# Patient Record
Sex: FEMALE | Race: WHITE | NOT HISPANIC OR LATINO
[De-identification: names, ages, dates, MRNs, and addresses within clinical notes are randomized per-mention and may not be internally consistent; named-entity substitution may affect disease eponyms.]

---

## 2018-07-17 ENCOUNTER — TRANSCRIPTION ENCOUNTER (OUTPATIENT)
Age: 58
End: 2018-07-17

## 2018-07-17 ENCOUNTER — INPATIENT (INPATIENT)
Facility: HOSPITAL | Age: 58
LOS: 7 days | Discharge: NURSING, REHAB OR LONG-TERM CARE | DRG: 65 | End: 2018-07-25
Attending: STUDENT IN AN ORGANIZED HEALTH CARE EDUCATION/TRAINING PROGRAM | Admitting: STUDENT IN AN ORGANIZED HEALTH CARE EDUCATION/TRAINING PROGRAM
Payer: COMMERCIAL

## 2018-07-17 VITALS
HEART RATE: 89 BPM | WEIGHT: 220.02 LBS | HEIGHT: 65 IN | DIASTOLIC BLOOD PRESSURE: 73 MMHG | OXYGEN SATURATION: 99 % | TEMPERATURE: 98 F | RESPIRATION RATE: 18 BRPM | SYSTOLIC BLOOD PRESSURE: 125 MMHG

## 2018-07-17 DIAGNOSIS — I63.9 CEREBRAL INFARCTION, UNSPECIFIED: ICD-10-CM

## 2018-07-17 DIAGNOSIS — Z29.9 ENCOUNTER FOR PROPHYLACTIC MEASURES, UNSPECIFIED: ICD-10-CM

## 2018-07-17 DIAGNOSIS — F32.9 MAJOR DEPRESSIVE DISORDER, SINGLE EPISODE, UNSPECIFIED: ICD-10-CM

## 2018-07-17 DIAGNOSIS — C50.919 MALIGNANT NEOPLASM OF UNSPECIFIED SITE OF UNSPECIFIED FEMALE BREAST: ICD-10-CM

## 2018-07-17 DIAGNOSIS — D72.829 ELEVATED WHITE BLOOD CELL COUNT, UNSPECIFIED: ICD-10-CM

## 2018-07-17 LAB
ALBUMIN SERPL ELPH-MCNC: 3.3 G/DL — LOW (ref 3.5–5)
ALP SERPL-CCNC: 109 U/L — SIGNIFICANT CHANGE UP (ref 40–120)
ALT FLD-CCNC: 21 U/L DA — SIGNIFICANT CHANGE UP (ref 10–60)
ANION GAP SERPL CALC-SCNC: 11 MMOL/L — SIGNIFICANT CHANGE UP (ref 5–17)
APTT BLD: 29.1 SEC — SIGNIFICANT CHANGE UP (ref 27.5–37.4)
AST SERPL-CCNC: 16 U/L — SIGNIFICANT CHANGE UP (ref 10–40)
BASOPHILS # BLD AUTO: 0.1 K/UL — SIGNIFICANT CHANGE UP (ref 0–0.2)
BASOPHILS NFR BLD AUTO: 0.8 % — SIGNIFICANT CHANGE UP (ref 0–2)
BILIRUB SERPL-MCNC: 1.1 MG/DL — SIGNIFICANT CHANGE UP (ref 0.2–1.2)
BUN SERPL-MCNC: 15 MG/DL — SIGNIFICANT CHANGE UP (ref 7–18)
CALCIUM SERPL-MCNC: 9.7 MG/DL — SIGNIFICANT CHANGE UP (ref 8.4–10.5)
CHLORIDE SERPL-SCNC: 103 MMOL/L — SIGNIFICANT CHANGE UP (ref 96–108)
CK MB BLD-MCNC: 1.2 % — SIGNIFICANT CHANGE UP (ref 0–3.5)
CK MB CFR SERPL CALC: 1.1 NG/ML — SIGNIFICANT CHANGE UP (ref 0–3.6)
CK SERPL-CCNC: 88 U/L — SIGNIFICANT CHANGE UP (ref 21–215)
CO2 SERPL-SCNC: 24 MMOL/L — SIGNIFICANT CHANGE UP (ref 22–31)
CREAT SERPL-MCNC: 1.03 MG/DL — SIGNIFICANT CHANGE UP (ref 0.5–1.3)
EOSINOPHIL # BLD AUTO: 0.1 K/UL — SIGNIFICANT CHANGE UP (ref 0–0.5)
EOSINOPHIL NFR BLD AUTO: 0.6 % — SIGNIFICANT CHANGE UP (ref 0–6)
GLUCOSE SERPL-MCNC: 120 MG/DL — HIGH (ref 70–99)
HCT VFR BLD CALC: 44.3 % — SIGNIFICANT CHANGE UP (ref 34.5–45)
HGB BLD-MCNC: 14.7 G/DL — SIGNIFICANT CHANGE UP (ref 11.5–15.5)
INR BLD: 1.13 RATIO — SIGNIFICANT CHANGE UP (ref 0.88–1.16)
LYMPHOCYTES # BLD AUTO: 1.4 K/UL — SIGNIFICANT CHANGE UP (ref 1–3.3)
LYMPHOCYTES # BLD AUTO: 12.1 % — LOW (ref 13–44)
MCHC RBC-ENTMCNC: 28.5 PG — SIGNIFICANT CHANGE UP (ref 27–34)
MCHC RBC-ENTMCNC: 33.2 GM/DL — SIGNIFICANT CHANGE UP (ref 32–36)
MCV RBC AUTO: 85.8 FL — SIGNIFICANT CHANGE UP (ref 80–100)
MONOCYTES # BLD AUTO: 0.7 K/UL — SIGNIFICANT CHANGE UP (ref 0–0.9)
MONOCYTES NFR BLD AUTO: 6.6 % — SIGNIFICANT CHANGE UP (ref 2–14)
NEUTROPHILS # BLD AUTO: 9 K/UL — HIGH (ref 1.8–7.4)
NEUTROPHILS NFR BLD AUTO: 79.9 % — HIGH (ref 43–77)
PLATELET # BLD AUTO: 238 K/UL — SIGNIFICANT CHANGE UP (ref 150–400)
POTASSIUM SERPL-MCNC: 3.7 MMOL/L — SIGNIFICANT CHANGE UP (ref 3.5–5.3)
POTASSIUM SERPL-SCNC: 3.7 MMOL/L — SIGNIFICANT CHANGE UP (ref 3.5–5.3)
PROT SERPL-MCNC: 7.5 G/DL — SIGNIFICANT CHANGE UP (ref 6–8.3)
PROTHROM AB SERPL-ACNC: 12.4 SEC — SIGNIFICANT CHANGE UP (ref 9.8–12.7)
RBC # BLD: 5.16 M/UL — SIGNIFICANT CHANGE UP (ref 3.8–5.2)
RBC # FLD: 12.7 % — SIGNIFICANT CHANGE UP (ref 10.3–14.5)
SODIUM SERPL-SCNC: 138 MMOL/L — SIGNIFICANT CHANGE UP (ref 135–145)
TROPONIN I SERPL-MCNC: <0.015 NG/ML — SIGNIFICANT CHANGE UP (ref 0–0.04)
TROPONIN I SERPL-MCNC: <0.015 NG/ML — SIGNIFICANT CHANGE UP (ref 0–0.04)
WBC # BLD: 11.2 K/UL — HIGH (ref 3.8–10.5)
WBC # FLD AUTO: 11.2 K/UL — HIGH (ref 3.8–10.5)

## 2018-07-17 PROCEDURE — 71045 X-RAY EXAM CHEST 1 VIEW: CPT | Mod: 26

## 2018-07-17 PROCEDURE — 99255 IP/OBS CONSLTJ NEW/EST HI 80: CPT

## 2018-07-17 PROCEDURE — 70450 CT HEAD/BRAIN W/O DYE: CPT | Mod: 26

## 2018-07-17 PROCEDURE — 99223 1ST HOSP IP/OBS HIGH 75: CPT | Mod: GC

## 2018-07-17 PROCEDURE — 99285 EMERGENCY DEPT VISIT HI MDM: CPT

## 2018-07-17 PROCEDURE — 93880 EXTRACRANIAL BILAT STUDY: CPT | Mod: 26

## 2018-07-17 RX ORDER — ENOXAPARIN SODIUM 100 MG/ML
40 INJECTION SUBCUTANEOUS DAILY
Qty: 0 | Refills: 0 | Status: DISCONTINUED | OUTPATIENT
Start: 2018-07-17 | End: 2018-07-25

## 2018-07-17 RX ORDER — ATORVASTATIN CALCIUM 80 MG/1
80 TABLET, FILM COATED ORAL AT BEDTIME
Qty: 0 | Refills: 0 | Status: DISCONTINUED | OUTPATIENT
Start: 2018-07-18 | End: 2018-07-25

## 2018-07-17 RX ORDER — ATORVASTATIN CALCIUM 80 MG/1
80 TABLET, FILM COATED ORAL ONCE
Qty: 0 | Refills: 0 | Status: COMPLETED | OUTPATIENT
Start: 2018-07-17 | End: 2018-07-17

## 2018-07-17 RX ORDER — ASPIRIN/CALCIUM CARB/MAGNESIUM 324 MG
325 TABLET ORAL ONCE
Qty: 0 | Refills: 0 | Status: COMPLETED | OUTPATIENT
Start: 2018-07-17 | End: 2018-07-17

## 2018-07-17 RX ORDER — ASPIRIN/CALCIUM CARB/MAGNESIUM 324 MG
81 TABLET ORAL DAILY
Qty: 0 | Refills: 0 | Status: DISCONTINUED | OUTPATIENT
Start: 2018-07-18 | End: 2018-07-18

## 2018-07-17 RX ORDER — SODIUM CHLORIDE 9 MG/ML
1000 INJECTION, SOLUTION INTRAVENOUS
Qty: 0 | Refills: 0 | Status: DISCONTINUED | OUTPATIENT
Start: 2018-07-17 | End: 2018-07-18

## 2018-07-17 RX ADMIN — ENOXAPARIN SODIUM 40 MILLIGRAM(S): 100 INJECTION SUBCUTANEOUS at 13:52

## 2018-07-17 NOTE — ED PROVIDER NOTE - CROS ED NEURO POS
R sided extremity weakness, R sided facial droop, slurred speech, difficulty speaking/DIFFICULTY WALKING / IMBALANCE

## 2018-07-17 NOTE — DISCHARGE NOTE ADULT - CARE PROVIDER_API CALL
Arabella Baeza), Cardiac Electrophysiology; Cardiology; Internal Medicine  [address] [place] [address]  Phone: [phone]  Fax: [phone] Arabella Baeza), Cardiac Electrophysiology; Cardiology; Internal Medicine  [address] [place] [address]  Phone: [phone]  Fax: [phone]    Holly Turner), Neurology  [address] [place] [address] [place] [address]  [place] [address]  Phone: [phone]  Fax: [phone]

## 2018-07-17 NOTE — CONSULT NOTE ADULT - ASSESSMENT
1) Embolic Stroke- Needs MRI-brain with and without BRET. TTE with bubble study. Give Aspirin for now. She needs embolic stroke work-up after MRI-brain is done.

## 2018-07-17 NOTE — SWALLOW BEDSIDE ASSESSMENT ADULT - SLP PERTINENT HISTORY OF CURRENT PROBLEM
58 y/o F with PMHx of Breast CA (s/p Lumpectomy in 2013 ; no chemo/ radiation), from home, lives alone. Admitted on 7/17/18 for complaint of right upper and lower extremity weakness and numbness with difficulty speaking, symptom onset >12 hours. CT Head indicated a left thalamic stroke of acute to subacute onset.

## 2018-07-17 NOTE — PHYSICAL THERAPY INITIAL EVALUATION ADULT - PLANNED THERAPY INTERVENTIONS, PT EVAL
balance training/bed mobility training/transfer training/gait training/motor coordination training/strengthening/neuromuscular re-education/ROM

## 2018-07-17 NOTE — PHYSICAL THERAPY INITIAL EVALUATION ADULT - PERTINENT HX OF CURRENT PROBLEM, REHAB EVAL
Pt experienced CVA with onset of Right UE and LE weakness since last night, with symptoms worsening this morning. + CVA with Rt. sided hemiparesis

## 2018-07-17 NOTE — H&P ADULT - PROBLEM SELECTOR PLAN 5
IMPROVE VTE Individual Risk Assessment          RISK                                                          Points  [  ] Previous VTE                                                3  [  ] Thrombophilia                                             2  [  ] Lower limb paralysis                                   2        (unable to hold up >15 seconds)    [  ] Current Cancer                                             2         (within 6 months)  [  x] Immobilization > 24 hrs                              1  [  ] ICU/CCU stay > 24 hours                             1  [  ] Age > 60                                                         1    IMPROVE VTE Score: 1 , however at high risk , will start on Levonox for dvt ppx

## 2018-07-17 NOTE — SWALLOW BEDSIDE ASSESSMENT ADULT - COMMENTS
Pt seen for bedside swallow evaluation 2/2 CVA to r/o aspiration. HOB elevated to 45 degrees. Sisters in law present at bedside. A,A+Ox4, able to follow directives and respond to questions regarding current status. Pt p/w mild dysarthric speech 2/2 R-side muscle weakness s/p CVA.

## 2018-07-17 NOTE — PHYSICAL THERAPY INITIAL EVALUATION ADULT - COORDINATION ASSESSED, REHAB EVAL
finger to nose/intact Lt, Moderate impairment Rt./heel to shin intact Lt, Moderate impairment Rt./finger to nose/heel to shin

## 2018-07-17 NOTE — DISCHARGE NOTE ADULT - INSTRUCTIONS
You tolerated a dysphagia diet inpatient with mechanical soft food and thin liquids, which is what we recommend on discharge.

## 2018-07-17 NOTE — H&P ADULT - ASSESSMENT
56 yo female with PMH of Breast CA (s/p Lumpectomy in 2013 ; no chemo/ radiation) , from home , lives alone comes in with complaint of right upper and Lower extremity weakness and numbness with difficulty speaking. Sx onset was last night; pt thought sx would resolve spontaneously so she went to sleep. Last normal 6 PM. This morning sx worsening.  Denies any chest pain , sob , palpitations , nausea , vomiting , dysuria , cough.  Denies h/o similar events , any history of stroke or heart attack.   PMH : Nulliparous , no menses since 10 years ago ; did not take OC pills in past ; h/o Cancer   Former smoker , no alcohol/drugs     In ED , 125/ 73 mm hg , Hr : 89 , Temp : 98.1 F   cbc shows white count of 11.2 with left shift   bmp wnl   T1 negative   Ct head shows a Left thalamic infract , acute/ subacute ; chronic ischemic changes in right cerebellum.    Will admit to telemetry for Stroke , acute onset.

## 2018-07-17 NOTE — PHYSICAL THERAPY INITIAL EVALUATION ADULT - IMPAIRMENTS FOUND, PT EVAL
muscle strength/gait, locomotion, and balance/aerobic capacity/endurance/ROM/sensory integrity/neuromotor development and sensory integration gross motor/aerobic capacity/endurance/neuromotor development and sensory integration/posture/visual motor/muscle strength/ROM/fine motor/gait, locomotion, and balance/sensory integrity

## 2018-07-17 NOTE — ED PROVIDER NOTE - OBJECTIVE STATEMENT
58 y/o F w/ no significant PMHx presents c/o trouble walking, trouble speaking, double vision x yesterday. Sx onset was last night; pt thought sx would resolve spontaneously so she went to sleep. Last normal 6 PM. This morning sx worsening. Pt lives alone. Now reports weakness of the R arm, R leg, R sided facial droop, slurred speech and double vision. Denies HA, CP, and any other complaints. Allergic to penicillin.

## 2018-07-17 NOTE — ED PROVIDER NOTE - MEDICAL DECISION MAKING DETAILS
iv, monitor, ecg, ct head, labs  left thalamic stroke on ct head. pt failed dysphagia test with water.   ua and cxr, asa, atorvastatin  needs carotid doppler, mri

## 2018-07-17 NOTE — PHYSICAL THERAPY INITIAL EVALUATION ADULT - IMPAIRMENTS CONTRIBUTING TO GAIT DEVIATIONS, PT EVAL
decreased strength/impaired balance/decreased ROM/impaired coordination/decreased sensation impaired balance/decreased sensation/impaired sensory feedback/decreased strength/impaired motor control/impaired postural control/decreased ROM/impaired coordination

## 2018-07-17 NOTE — PHYSICAL THERAPY INITIAL EVALUATION ADULT - ACTIVE RANGE OF MOTION EXAMINATION, REHAB EVAL
slightly dec AROM in R UE and LE s/p CVA/deficits as listed below Left UE Active ROM was WNL (within normal limits)/deficits as listed below/LLE Active ROM was WNL (within normal limits)/Rt. shoulder ~1/3 range; Rt. elbow ~2/3 range hand WFL. Rt. LE ~2/3 range against gravity

## 2018-07-17 NOTE — H&P ADULT - ATTENDING COMMENTS
Agree with above   Seen and examined in ED . She is a 58 yo female with PMH of Breast CA (s/p Lumpectomy in 2013 ; no chemo/ radiation) , from home , lives alone comes in with complaint of right upper and Lower extremity weakness and numbness with difficulty speaking along with droopy face.   Symptoms onset was last night; pt thought they would resolve spontaneously so she went to sleep. Last normal 6 PM. This morning symptoms  worsening. Denies any chest pain , sob , palpitations , nausea , vomiting , dysuria , cough. Denies h/o similar events , any history of stroke or heart attack.     ROS and PE as above    Vital Signs Last 24 Hrs  T(C): 36.8 (17 Jul 2018 14:21), Max: 36.8 (17 Jul 2018 14:21)  T(F): 98.3 (17 Jul 2018 14:21), Max: 98.3 (17 Jul 2018 14:21)  HR: 72 (17 Jul 2018 14:21) (72 - 98)  BP: 106/55 (17 Jul 2018 14:21) (106/55 - 125/73)  BP(mean): --  RR: 18 (17 Jul 2018 14:21) (16 - 18)  SpO2: 98% (17 Jul 2018 14:21) (94% - 99%)    1. Acute CVA   CT head done in ED showing left thalamic infarct of indeterminate age, possibly acute/subacute. Chronic ischemic changes in the right cerebellum.  Concern for embolic stroke  telemetry monitoring   Scheduled for MRI of Brain and US carotids along with TTE   Pass bedside swallow test   Will get an official test, since patient had difficulties drinking fluids   PT evaluation  c/w Asprin and Atorvastatin  Discussed the case with Dr. Castro -neurologist    The rest as above

## 2018-07-17 NOTE — H&P ADULT - PROBLEM SELECTOR PLAN 4
IMPROVE VTE Individual Risk Assessment          RISK                                                          Points  [  ] Previous VTE                                                3  [  ] Thrombophilia                                             2  [  ] Lower limb paralysis                                   2        (unable to hold up >15 seconds)    [  ] Current Cancer                                             2         (within 6 months)  [  x] Immobilization > 24 hrs                              1  [  ] ICU/CCU stay > 24 hours                             1  [  ] Age > 60                                                         1    IMPROVE VTE Score: 1 , however at high risk , will start on Levonox for dvt ppx h/o stage 1 cancer of Left breast, s/p Lumpectomy   In remission

## 2018-07-17 NOTE — H&P ADULT - PROBLEM SELECTOR PLAN 1
No Comes in with weakness , numbness of right side , left facial droop and slurring of speech , symptom onset >12 hours ago   NIHSS 7   Ct head shows a Left thalamic stroke of acute to subacute onset   Will start on aspirin , atorvastatin 80 mg OD  Will get MRI head and MR angio neck to evaluate circulation   Failed the bedside speech and swallow , coughing+  Will keep NPO , give gentle hydration and get Speech and swallow evaluation   PT consult   Will consult Neuro, Dr. Castro  Monitor on telemetry   T1 negative   Will get T2, T3   Will get Echocardiogram  Risk factors for vasculopathy : Smoking ( 20 pack years ) , age   Risk factors for hypercoagulability : Nulliparous , age , smoker , cancer

## 2018-07-17 NOTE — CONSULT NOTE ADULT - SUBJECTIVE AND OBJECTIVE BOX
History of Present Illness:    HPI:  58 yo female with PMH of Breast CA (s/p Lumpectomy in 2013 ; no chemo/ radiation) , from home , lives alone comes in with complaint of right upper and Lower extremity weakness and numbness with difficulty speaking. Sx onset was last night; pt thought sx would resolve spontaneously so she went to sleep. Last normal 6 PM. This morning sx worsening.  Denies any chest pain , sob , palpitations , nausea , vomiting , dysuria , cough.  Denies h/o similar events , any history of stroke or heart attack.   PMH : Nulliparous , no menses since 10 years ago ; did not take OC pills in past ; h/o Cancer   Former smoker , no alcohol/drugs     In ED , 125/ 73 mm hg , Hr : 89 , Temp : 98.1 F   cbc shows white count of 11.2 with left shift   bmp wnl   T1 negative   Ct head shows a Left thalamic infract , acute/ subacute ; chronic ischemic changes in right cerebellum (17 Jul 2018 11:07)    Later in ED: Pt presented with slurring of the speech, right arm and leg weakness and numbness since yesterday evening. She denied any headaches but also has blurred vision. No nausea or vomiting. She also reported imbalance. No fall or head trauma. She has h/o CA breast , s/p lumpectomy but no chemotherapy or radiation therapy. She denied any left side symptoms. Her CT-scan of the brain shows LDA in left internal capsule and also in right cerebellar region.      Allergies    penicillin (Unknown)    Intolerances    PAST MEDICAL & SURGICAL HISTORY:  Breast cancer: s/p Lumpectomy  No significant past surgical history    Social History: [ ] Tobacco use, [ ] Alcohol use.  ex-smoker      MEDICATIONS  (STANDING):  dextrose 5% + sodium chloride 0.9%. 1000 milliLiter(s) (70 mL/Hr) IV Continuous <Continuous>  enoxaparin Injectable 40 milliGRAM(s) SubCutaneous daily      Family:  FAMILY HISTORY:  No pertinent family history in first degree relatives    Review of Systems:  General: [ ] None, [ ] chills,[ ] fatigue,[ ] fevers  Skin: [ ] None, [ ] rash present  HEENT: [ ] None, [ ] head injury,[x ] blurred vision, [ ] double vision, [ ] eye pain, [ ] visual loss, [ ] hearing loss, [ ] deafness, [ ] ear pain, [ ] ringing in the ears, [ ] vertigo, [ ] sinus pain, [ ] voice changes  Neck: [ ] None, [ ] Neck stiffness  Respiratory: [ ]  None, [ ] cough, [ ] difficulty breathing  Cardiovascular: [ ] None,  [ ] calf cramps, [ ] chest pain, [ ] leg pain, [ ] swelling, [ ] rapid heart rate, [ ] shortness of breath  Gastrointestinal: [ ] None, [ ] abdominal pain, [ ] nausea, [ ] vomiting  Musculoskeletal: [ ] None, [ ] back pain, [ ] joint pain, [ ] joint stiffness, [ ] leg cramps, [ ] muscle atrophy, [ ] muscle cramps, [ ] muscle weakness, [ ] swelling of extremities  Neurological: [ ] None,  [ ] Dizziness, [ ] decreased memory, [ ] fainting, [ ] focal neurological symptoms, [ ] headaches, [ ] incontinence of stool, [ ] incontinence of urine, [ ] loss of consciousness, [ ] numbness, [ ] seizures, [ ] spinning sensation, [ ] stroke, [ ] trouble walking, [ ] unsteadiness, [ ] visual changes,  [x ] weakness- right face, arm and leg, [ ] tremors, [ ] rigidity, [ ] slowness, [x] aphasia  Psychiatric: [ ] None,  [ ] depression, [ ] anxiety, [ ] hallucinations, [ ] inability to concentrate,[ ] mood changes, [ ] panic attacks  Hematology: [ ] None, [ ] blood clots, [ ] spontaneous bleeding    [x ]  None except marked above      Vital Signs:    T(C): 36.8 (07-17-18 @ 14:21), Max: 36.8 (07-17-18 @ 14:21)  HR: 72 (07-17-18 @ 14:21) (72 - 98)  BP: 106/55 (07-17-18 @ 14:21) (106/55 - 125/73)  RR: 18 (07-17-18 @ 14:21) (16 - 18)  SpO2: 98% (07-17-18 @ 14:21) (94% - 99%)    Physical Exam:  General:  General Appearance - Well groomed, Not sickly   Build and nutrition - Well nourished and Well developed  Posture - Normal posture    Head and Neck:  Head - normocephalic, atraumatic with no lesions or palpable masses    Chest and Lung exam:  Quiet, even and easy respiratory effort with no use of accessory muscles and on ausculation, normal breath sounds, no adventitious sounds and normal vocal resonance    Cardiovascular:  Auscultation: Normal heart sounds  Murmurs & Other heart sounds: Auscultation of the heart reveals- no murmurs  Carotid arteris: No Carotid bruits    Abdomen:  Palpation/Percussion: Non Tender, No Rebound tenderness, No hepatosplenomegaly, and No palpable abdominal masses    Peripheral Vascular:  Lower extremity: Inspection - Bilateral - No varicose veins  Palpation: Tenderness - bilateral - Non tender  Dorsalis pedis pulse - bilateral - normal  Edema - bilateral - no edema    Neurologic Exam:  Mental Status -  Sleepy, arousable  Fund of Knowledge – Normal  Affect- appropriate  Recent Memory – Normal  Remote Memory – Normal  Attention Span – Normal  Concentration –  Normal  Cognitive function – Normal  Speech – expressive aphasia  Thought content/perception – Normal    Cranial Nerves:  II Optic: Visual acuity – Bilateral - Normal ; Visual fields – normal ; Fundi – Bilateral – no optic atrophy or Papilledema  III Oculomotor – Normal bilaterally  IV Trochlear – Bilateral – Upward gaze limitation.  V Trigeminal: Ophthalmic – Bilateral – Normal;  Maxillary – Bilateral- Normal; Mandibular – Bilateral - Normal  VI Abducens – Left eye LR palsy,  VII Facial: Right facial droop  VIII Acoustic - Bilateral – hearing normal and (hearing tested by finger rub)  IX Glossopharyngeal / X Vagus: Uvula – Normal  XI Accessory: Normal Shoulder Shrug  XII Hypoglossal – Bilaterally Normal  Eye Movements: Gaze – Bilateral - Normal    Nystagmus – Bilateral – None  Motor:  Bulk and Contour: Normal  Tone: Normal  Strength:                                                             Delt              Bicep           Tricep                                 Upper Extremities:          Right              3/5               3/5               3/5                3/5                                                          Left                5/5               5/5               5/5                5/5                                                                                  HF                 KE                KF                   DF                     Lower Extremities:          Right             3/5               3/5              3/5                  3/5                                                          Left               5/5               5/5              5/5                  5/5  General Assessment of Reflexes: Right Wrist - 2+ ;  Left Wrist - 2+ ; Right Elbow - 2+ ;  Left Elbow - 2+ ;  Right Knee - 2+ ;  Left Knee - 2+ ;   Right Ankle – 2+ ; Left Ankle – 2+  Plantar Reflexes(Babinski) (L4-S2) – Right-dorsiflexion  Sensory:  Light Touch: decreased right arm and right leg  Pain: Intact – Globally  Temperature: Intact – Globally  Coordination – No Impairment of heel-to-shin, Impairment of finger-to-nose or Impairment of rapid alternating movement

## 2018-07-17 NOTE — PHYSICAL THERAPY INITIAL EVALUATION ADULT - DIAGNOSIS, PT EVAL
Impaired balance, R-sided muscle weakness, impaired ROM, and decreased endurance s/p CVA event. Impaired balance, ROM, vision, endurance, coordination, sensation, strength. s/p CVA event.

## 2018-07-17 NOTE — PHYSICAL THERAPY INITIAL EVALUATION ADULT - CRITERIA FOR SKILLED THERAPEUTIC INTERVENTIONS
risk reduction/prevention/functional limitations in following categories/rehab potential/anticipated discharge recommendation/impairments found rehab potential/impairments found/functional limitations in following categories/predicted duration of therapy intervention/anticipated discharge recommendation/risk reduction/prevention

## 2018-07-17 NOTE — PHYSICAL THERAPY INITIAL EVALUATION ADULT - BALANCE DISTURBANCE, IDENTIFIED IMPAIRMENT CONTRIBUTE, REHAB EVAL
decreased ROM/impaired coordination/decreased sensation/decreased strength decreased ROM/impaired sensory feedback/decreased sensation/decreased strength/impaired coordination/impaired postural control

## 2018-07-17 NOTE — DISCHARGE NOTE ADULT - CARE PROVIDERS DIRECT ADDRESSES
,DirectAddress_Unknown ,DirectAddress_Unknown,[email or web address]

## 2018-07-17 NOTE — DISCHARGE NOTE ADULT - CARE PLAN
Principal Discharge DX:	Stroke  Goal:	Management  Assessment and plan of treatment:	You presented to the ED with symptoms of slurred speech, right upper and lower extremity weakness and facial droop. CT head was indicative of an acute stroke. MRI head showed acute stroke with multiple chronic infarcts noted. You are currently being treated with aspirin and atorvastatin.

## 2018-07-17 NOTE — SWALLOW BEDSIDE ASSESSMENT ADULT - PHARYNGEAL PHASE
reduced hyolaryngeal excursion/Delayed pharyngeal swallow Delayed pharyngeal swallow/reduced hyolaryngeal excursion reduced hyolaryngeal excursion/Delayed pharyngeal swallow/Cough post oral intake

## 2018-07-17 NOTE — PHYSICAL THERAPY INITIAL EVALUATION ADULT - GAIT DEVIATIONS NOTED, PT EVAL
decreased weight-shifting ability/decreased bhupendra/increased time in double stance/decreased step length

## 2018-07-17 NOTE — SWALLOW BEDSIDE ASSESSMENT ADULT - SWALLOW EVAL: DIAGNOSIS
Pt p/w s&s oropharyngeal dysphagia c/b  reduced AP transport, slow oral transit, delayed swallow trigger, & reduced hyolaryngeal excursion for all consistencies. Overt s&s of penetration/aspiration on thin liquids.

## 2018-07-17 NOTE — PHYSICAL THERAPY INITIAL EVALUATION ADULT - IMPAIRMENTS CONTRIBUTING IMPAIRED BED MOBILITY, REHAB EVAL
impaired balance/decreased ROM/decreased sensation/impaired coordination/decreased strength impaired sensory feedback/decreased strength/decreased sensation/impaired coordination/decreased ROM/impaired postural control/impaired balance/impaired motor control

## 2018-07-17 NOTE — H&P ADULT - MOTOR
strength is 3/5 in upper and lower extremity on right side ; 5/5 in Left Ue/ LE  Sensations decreased in right Ue/ LE

## 2018-07-17 NOTE — DISCHARGE NOTE ADULT - MEDICATION SUMMARY - MEDICATIONS TO TAKE
I will START or STAY ON the medications listed below when I get home from the hospital:    aspirin 81 mg oral tablet, chewable  -- 1 tab(s) by mouth once a day  -- Indication: For Stroke    atorvastatin 80 mg oral tablet  -- 1 tab(s) by mouth once a day (at bedtime)  -- Indication: For Stroke I will START or STAY ON the medications listed below when I get home from the hospital:    aspirin 81 mg oral tablet, chewable  -- 1 tab(s) by mouth once a day  -- Indication: For Stroke    LORazepam 0.5 mg oral tablet  -- 1 tab(s) by mouth every 6 hours, As needed, Anxiety MDD:2mg  -- Indication: For Anxiety    citalopram 20 mg oral tablet  -- 1 tab(s) by mouth once a day (at bedtime)  -- Indication: For Anxiety    atorvastatin 80 mg oral tablet  -- 1 tab(s) by mouth once a day (at bedtime)  -- Indication: For HLD    hydrocortisone 1% topical cream  -- 1 application on skin once a day  -- Indication: For topical    pantoprazole 40 mg oral delayed release tablet  -- 1 tab(s) by mouth once a day (before a meal)  -- Indication: For gi ppx

## 2018-07-17 NOTE — PHYSICAL THERAPY INITIAL EVALUATION ADULT - BED MOBILITY LIMITATIONS, REHAB EVAL
decreased ability to use arms for pushing/pulling/decreased ability to use legs for bridging/pushing decreased ability to use arms for pushing/pulling/impaired ability to control trunk for mobility/decreased ability to use legs for bridging/pushing

## 2018-07-17 NOTE — PHYSICAL THERAPY INITIAL EVALUATION ADULT - GENERAL OBSERVATIONS, REHAB EVAL
Pt seen in supine in bed in ED. Pt has difficulty speaking and has slight weakness throughout R UE and LE. Pt seen in supine in bed in ED, NAD. Pt has difficulty speaking and has slight weakness throughout R UE and LE. Rt. is Rt. hand dominant.

## 2018-07-17 NOTE — H&P ADULT - HISTORY OF PRESENT ILLNESS
56 yo female 56 yo female with no PMH , from home , lives alone comes in with complaint of right upper and Lower weakness 56 yo female with PMH of Breast CA (s/p Lumpectomy in 2013 ; no chemo/ radiation) , from home , lives alone comes in with complaint of right upper and Lower extremity weakness and numbness with difficulty speaking. Sx onset was last night; pt thought sx would resolve spontaneously so she went to sleep. Last normal 6 PM. This morning sx worsening.  Denies any chest pain , sob , palpitations , nausea , vomiting , dysuria , cough.  Denies h/o similar events , any history of stroke or heart attack.   PMH : Nulliparous , no menses since 10 years ago ; did not take OC pills in past ; h/o Cancer   Former smoker , no alcohol/drugs     In ED , 125/ 73 mm hg , Hr : 89 , Temp : 98.1 F   cbc shows white count of 11.2 with left shift   bmp wnl   T1 negative   Ct head shows a Left thalamic infract , acute/ subacute ; chronic ischemic changes in right cerebellum

## 2018-07-17 NOTE — DISCHARGE NOTE ADULT - HOSPITAL COURSE
Ms. Naila Banegas is a 57 year old female with PMH of breast CA s/p lumpectomy who came from home with complaint of right upper and lower extremity weakness, as well as symptoms of slurred speech and facial droop. Patient initially went to sleep after onset of symptoms hoping they would resolve, but awoke to find they had worsened. Patient was admitted for high suspicion of CVA. CT head showed left thalamic acute/subacute infarct. MRI Head shows left thalamic infarct with extension to midbrain. Carotid doppler shows no significant stenosis. PT recommends acute rehab. Patient understands and agrees to plan of care.    Patient is medically clear for discharge at this time. Discussed with Dr. Chavira.

## 2018-07-17 NOTE — SWALLOW BEDSIDE ASSESSMENT ADULT - CONSISTENCIES ADMINISTERED
thin liquid/ice chip x 1 puree/x 3 tsp nectar thick/x 1 oz thin liquid/x 2 oz cookie with applesauce x 2/mech soft

## 2018-07-17 NOTE — H&P ADULT - NSHPLABSRESULTS_GEN_ALL_CORE
14.7   11.2  )-----------( 238      ( 17 Jul 2018 09:14 )             44.3     07-17    138  |  103  |  15  ----------------------------<  120<H>  3.7   |  24  |  1.03    Ca    9.7      17 Jul 2018 09:14    TPro  7.5  /  Alb  3.3<L>  /  TBili  1.1  /  DBili  x   /  AST  16  /  ALT  21  /  AlkPhos  109  07-17    < from: CT Head No Cont (07.17.18 @ 08:52) >      IMPRESSION:    1)  left thalamic infarct of indeterminate age, possibly acute/subacute.  2)  chronic ischemic changes in the right cerebellum.    Follow-up MR imaging recommended for further assessment.      < end of copied text >

## 2018-07-17 NOTE — H&P ADULT - PROBLEM SELECTOR PLAN 2
Patient takes a medication , doesn't remember   Lives alone at home   Doesn't remember name of pharmacy , gets via mail  Will continue to monitor off meds for now  Not depressed at the time of exam white count of 11 with left shift   No cough, sob , dysuria or any possible source of infection   Could be reactive   Will however obtain CXR , UA

## 2018-07-17 NOTE — PHYSICAL THERAPY INITIAL EVALUATION ADULT - IMPAIRED TRANSFERS: SIT/STAND, REHAB EVAL
decreased strength/impaired coordination/decreased ROM/impaired balance/decreased sensation decreased ROM/impaired coordination/impaired balance/decreased strength/impaired motor control/impaired postural control/decreased sensation/impaired sensory feedback

## 2018-07-17 NOTE — H&P ADULT - PROBLEM SELECTOR PLAN 3
h/o stage 1 cancer of Left breast, s/p Lumpectomy   In remission Patient takes a medication , doesn't remember   Lives alone at home   Doesn't remember name of pharmacy , gets via mail  Will continue to monitor off meds for now  Not depressed at the time of exam

## 2018-07-17 NOTE — DISCHARGE NOTE ADULT - ADDITIONAL INSTRUCTIONS
You are being discharged to an acute rehab to help you after your stroke. You will follow up with a cardiologist once discharge for a loop recorder outpatient so that your heart activity can be monitored regularly. You are being discharged to an acute rehab to help you after your stroke. You will follow up with a cardiologist once discharge for a loop recorder outpatient so that your heart activity can be monitored regularly.    Follow up with Dr. Turner (Neurologist) outpatient You are being discharged to an acute rehab to help you after your stroke. You will follow up with a cardiologist once discharge for a loop recorder outpatient so that your heart activity can be monitored regularly.    White blood cell count mildly elevated on discharge. Patient is afebrile and there is no left shift on CBC with diff. There is no clinical evidence of any infectious process.  Follow up with Dr. Turner (Neurologist) outpatient You are being discharged to an acute rehab to help you after your stroke.     You will follow up with a cardiologist once discharge for a loop recorder outpatient so that your heart activity can be monitored regularly.    White blood cell count mildly elevated on discharge. Patient is afebrile and there is no left shift on CBC with diff. There is no clinical evidence of any infectious process.    Follow up with Dr. Turner (Neurologist) outpatient

## 2018-07-18 LAB
24R-OH-CALCIDIOL SERPL-MCNC: 22.6 NG/ML — LOW (ref 30–80)
ANION GAP SERPL CALC-SCNC: 8 MMOL/L — SIGNIFICANT CHANGE UP (ref 5–17)
BASOPHILS # BLD AUTO: 0.1 K/UL — SIGNIFICANT CHANGE UP (ref 0–0.2)
BASOPHILS NFR BLD AUTO: 1.1 % — SIGNIFICANT CHANGE UP (ref 0–2)
BUN SERPL-MCNC: 13 MG/DL — SIGNIFICANT CHANGE UP (ref 7–18)
CALCIUM SERPL-MCNC: 8.8 MG/DL — SIGNIFICANT CHANGE UP (ref 8.4–10.5)
CHLORIDE SERPL-SCNC: 108 MMOL/L — SIGNIFICANT CHANGE UP (ref 96–108)
CHOLEST SERPL-MCNC: 166 MG/DL — SIGNIFICANT CHANGE UP (ref 10–199)
CO2 SERPL-SCNC: 26 MMOL/L — SIGNIFICANT CHANGE UP (ref 22–31)
CREAT SERPL-MCNC: 0.95 MG/DL — SIGNIFICANT CHANGE UP (ref 0.5–1.3)
EOSINOPHIL # BLD AUTO: 0.3 K/UL — SIGNIFICANT CHANGE UP (ref 0–0.5)
EOSINOPHIL NFR BLD AUTO: 4.1 % — SIGNIFICANT CHANGE UP (ref 0–6)
GLUCOSE SERPL-MCNC: 98 MG/DL — SIGNIFICANT CHANGE UP (ref 70–99)
HBA1C BLD-MCNC: 5.7 % — HIGH (ref 4–5.6)
HCT VFR BLD CALC: 42.3 % — SIGNIFICANT CHANGE UP (ref 34.5–45)
HDLC SERPL-MCNC: 34 MG/DL — LOW (ref 40–125)
HGB BLD-MCNC: 13.9 G/DL — SIGNIFICANT CHANGE UP (ref 11.5–15.5)
LIPID PNL WITH DIRECT LDL SERPL: 102 MG/DL — SIGNIFICANT CHANGE UP
LYMPHOCYTES # BLD AUTO: 1.9 K/UL — SIGNIFICANT CHANGE UP (ref 1–3.3)
LYMPHOCYTES # BLD AUTO: 22.3 % — SIGNIFICANT CHANGE UP (ref 13–44)
MAGNESIUM SERPL-MCNC: 2.4 MG/DL — SIGNIFICANT CHANGE UP (ref 1.6–2.6)
MCHC RBC-ENTMCNC: 28.6 PG — SIGNIFICANT CHANGE UP (ref 27–34)
MCHC RBC-ENTMCNC: 32.8 GM/DL — SIGNIFICANT CHANGE UP (ref 32–36)
MCV RBC AUTO: 87 FL — SIGNIFICANT CHANGE UP (ref 80–100)
MONOCYTES # BLD AUTO: 0.7 K/UL — SIGNIFICANT CHANGE UP (ref 0–0.9)
MONOCYTES NFR BLD AUTO: 8.8 % — SIGNIFICANT CHANGE UP (ref 2–14)
NEUTROPHILS # BLD AUTO: 5.3 K/UL — SIGNIFICANT CHANGE UP (ref 1.8–7.4)
NEUTROPHILS NFR BLD AUTO: 63.7 % — SIGNIFICANT CHANGE UP (ref 43–77)
PHOSPHATE SERPL-MCNC: 3.1 MG/DL — SIGNIFICANT CHANGE UP (ref 2.5–4.5)
PLATELET # BLD AUTO: 231 K/UL — SIGNIFICANT CHANGE UP (ref 150–400)
POTASSIUM SERPL-MCNC: 3.8 MMOL/L — SIGNIFICANT CHANGE UP (ref 3.5–5.3)
POTASSIUM SERPL-SCNC: 3.8 MMOL/L — SIGNIFICANT CHANGE UP (ref 3.5–5.3)
RBC # BLD: 4.86 M/UL — SIGNIFICANT CHANGE UP (ref 3.8–5.2)
RBC # FLD: 12.8 % — SIGNIFICANT CHANGE UP (ref 10.3–14.5)
SODIUM SERPL-SCNC: 142 MMOL/L — SIGNIFICANT CHANGE UP (ref 135–145)
TOTAL CHOLESTEROL/HDL RATIO MEASUREMENT: 4.9 RATIO — SIGNIFICANT CHANGE UP (ref 3.3–7.1)
TRIGL SERPL-MCNC: 151 MG/DL — HIGH (ref 10–149)
TSH SERPL-MCNC: 1.75 UU/ML — SIGNIFICANT CHANGE UP (ref 0.34–4.82)
VIT B12 SERPL-MCNC: 437 PG/ML — SIGNIFICANT CHANGE UP (ref 232–1245)
WBC # BLD: 8.3 K/UL — SIGNIFICANT CHANGE UP (ref 3.8–10.5)
WBC # FLD AUTO: 8.3 K/UL — SIGNIFICANT CHANGE UP (ref 3.8–10.5)

## 2018-07-18 PROCEDURE — 99233 SBSQ HOSP IP/OBS HIGH 50: CPT | Mod: GC

## 2018-07-18 PROCEDURE — 99254 IP/OBS CNSLTJ NEW/EST MOD 60: CPT

## 2018-07-18 PROCEDURE — 70551 MRI BRAIN STEM W/O DYE: CPT | Mod: 26

## 2018-07-18 RX ORDER — SODIUM CHLORIDE 9 MG/ML
1000 INJECTION, SOLUTION INTRAVENOUS
Qty: 0 | Refills: 0 | Status: DISCONTINUED | OUTPATIENT
Start: 2018-07-18 | End: 2018-07-19

## 2018-07-18 RX ORDER — PANTOPRAZOLE SODIUM 20 MG/1
40 TABLET, DELAYED RELEASE ORAL DAILY
Qty: 0 | Refills: 0 | Status: DISCONTINUED | OUTPATIENT
Start: 2018-07-18 | End: 2018-07-21

## 2018-07-18 RX ORDER — ASPIRIN/CALCIUM CARB/MAGNESIUM 324 MG
300 TABLET ORAL DAILY
Qty: 0 | Refills: 0 | Status: DISCONTINUED | OUTPATIENT
Start: 2018-07-18 | End: 2018-07-19

## 2018-07-18 RX ADMIN — Medication 300 MILLIGRAM(S): at 12:20

## 2018-07-18 RX ADMIN — SODIUM CHLORIDE 70 MILLILITER(S): 9 INJECTION, SOLUTION INTRAVENOUS at 12:21

## 2018-07-18 RX ADMIN — ATORVASTATIN CALCIUM 80 MILLIGRAM(S): 80 TABLET, FILM COATED ORAL at 22:04

## 2018-07-18 RX ADMIN — ENOXAPARIN SODIUM 40 MILLIGRAM(S): 100 INJECTION SUBCUTANEOUS at 12:20

## 2018-07-18 RX ADMIN — SODIUM CHLORIDE 70 MILLILITER(S): 9 INJECTION, SOLUTION INTRAVENOUS at 02:10

## 2018-07-18 RX ADMIN — PANTOPRAZOLE SODIUM 40 MILLIGRAM(S): 20 TABLET, DELAYED RELEASE ORAL at 12:59

## 2018-07-18 NOTE — PROGRESS NOTE ADULT - ASSESSMENT
Ms. Naila Banegas is a Ms. Naila Banegas is a 57 year old female with PMH of breast CA s/p lumpectomy who came from home with complaint of right upper and lower extremity weakness, as well as symptoms of slurred speech and facial droop. Patient initially went to sleep after onset of symptoms hoping they would resolve, but awoke to find they had worsened. Patient was admitted for high suspicion of CVA. CT head showed left thalamic acute/subacute infarct. MRI results pending.

## 2018-07-18 NOTE — PROGRESS NOTE ADULT - ASSESSMENT
56 yo female with PMH of Breast CA (s/p Lumpectomy in 2013 ; no chemo/ radiation) , from home , lives alone comes in with complaint of right upper and Lower extremity weakness and numbness with difficulty speaking.

## 2018-07-18 NOTE — CONSULT NOTE ADULT - ATTENDING COMMENTS
Thank you for the courtesy of a consultation, please contact me for any additional questions
57-year-old woman presented with slurred speech, right face, arm and left weakness with ataxia. Her neuro-examination revealed cranial nerves; III,IV, VI, VII involvement with right hemiparesis and consistent with embolic stroke. Her CT-scan of the brain shows left IC and right cerebellar LDA. She has h/o breast cancer, s/p lumpectomy. She will have MRI-brain with and without BRET for now and TTE with bubble study. Further recommendation will depend on MRI-brain result.

## 2018-07-18 NOTE — CONSULT NOTE ADULT - ASSESSMENT
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 CVA: Aspirin and statin, care per neurology  -MRI pending  -Echo with bubble study pending  -If non-contributory, will plan for JOSHUA tomorrow (pending MRI results). Please keep patient NPO after midnight.    2. HLD: On atorvastatin    ***Note that this is a preliminary note and any recommendations should NOT be carried out until this note is finalized. 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 CVA: Aspirin and statin, care per neurology  -MRI pending  -Echo with bubble study pending; review of preliminary images at bedside showed preserved EF, negative bubble study, no evidence of embolus  -Will plan for JOSHUA tomorrow (pending MRI results). Please keep patient NPO after midnight.  -If JOSHUA is negative, patient will need loop recorder placed. Can see Dr. Baeza at [phone].   -Patient and  are agreeable with above plan    2. HLD: On atorvastatin

## 2018-07-18 NOTE — PROGRESS NOTE ADULT - SUBJECTIVE AND OBJECTIVE BOX
PGY1 Note discussed with supervising resident and primary attending.    Patient is a 57y old  Female who presents with a chief complaint of weakness of right upper and le (17 Jul 2018 17:42)      INTERVAL HPI/OVERNIGHT EVENTS:    Ms. Banegas is seen at the beside. She reports that she feels unchanged from when she first presented to the hospital. She says she still has difficulty speaking and swallowing.     MEDICATIONS  (STANDING):  aspirin Suppository 300 milliGRAM(s) Rectal daily  atorvastatin 80 milliGRAM(s) Oral at bedtime  dextrose 5% + sodium chloride 0.9%. 1000 milliLiter(s) (70 mL/Hr) IV Continuous <Continuous>  enoxaparin Injectable 40 milliGRAM(s) SubCutaneous daily  pantoprazole  Injectable 40 milliGRAM(s) IV Push daily    MEDICATIONS  (PRN):      Allergies    penicillin (Unknown)    Intolerances        REVIEW OF SYSTEMS:  CONSTITUTIONAL: No fever, weight loss, or fatigue  RESPIRATORY: No cough, wheezing, chills or hemoptysis; No shortness of breath  CARDIOVASCULAR: No chest pain, palpitations, dizziness, or leg swelling  GASTROINTESTINAL: No abdominal or epigastric pain. No nausea, vomiting, or hematemesis; No diarrhea or constipation. No melena or hematochezia.  NEUROLOGICAL: No headaches, memory loss, loss of strength, numbness, or tremors  SKIN: No itching, burning, rashes, or lesions     Vital Signs Last 24 Hrs  T(C): 37 (18 Jul 2018 15:31), Max: 37 (18 Jul 2018 15:31)  T(F): 98.6 (18 Jul 2018 15:31), Max: 98.6 (18 Jul 2018 15:31)  HR: 76 (18 Jul 2018 15:31) (70 - 84)  BP: 97/78 (18 Jul 2018 15:31) (92/59 - 135/65)  BP(mean): --  RR: 18 (18 Jul 2018 15:31) (17 - 18)  SpO2: 98% (18 Jul 2018 15:31) (93% - 98%)    PHYSICAL EXAM:  GENERAL: NAD, well-groomed, well-developed  HEAD:  Atraumatic, Normocephalic  EYES: EOMI, PERRLA, conjunctiva and sclera clear  NECK: Supple, No JVD, Normal thyroid  CHEST/LUNG: Clear to percussion bilaterally; No rales, rhonchi, wheezing, or rubs  HEART: Regular rate and rhythm; No murmurs, rubs, or gallops  ABDOMEN: Soft, Nontender, Nondistended; Bowel sounds present  NERVOUS SYSTEM:  Alert & Oriented X3, Good concentration; Motor Strength 5/5 B/L   EXTREMITIES:  2+ Peripheral Pulses, No clubbing, cyanosis, or edema  SKIN;    LABS:                        13.9   8.3   )-----------( 231      ( 18 Jul 2018 07:01 )             42.3     07-18    142  |  108  |  13  ----------------------------<  98  3.8   |  26  |  0.95    Ca    8.8      18 Jul 2018 07:01  Phos  3.1     07-18  Mg     2.4     07-18    TPro  7.5  /  Alb  3.3<L>  /  TBili  1.1  /  DBili  x   /  AST  16  /  ALT  21  /  AlkPhos  109  07-17    PT/INR - ( 17 Jul 2018 09:14 )   PT: 12.4 sec;   INR: 1.13 ratio         PTT - ( 17 Jul 2018 09:14 )  PTT:29.1 sec    CAPILLARY BLOOD GLUCOSE          RADIOLOGY & ADDITIONAL TESTS:    Imaging Personally Reviewed:  [ ] YES  [ ] NO    Consultant(s) Notes Reviewed:  [ ] YES  [ ] NO PGY1 Note discussed with supervising resident and primary attending.    Patient is a 57y old  Female who presents with a chief complaint of weakness of right upper and le (17 Jul 2018 17:42)      INTERVAL HPI/OVERNIGHT EVENTS:    Ms. Banegas is seen at the beside. She reports that she feels unchanged from when she first presented to the hospital. She says she still has difficulty speaking and swallowing. She reports that she feels continued weakness on her right side.    MEDICATIONS  (STANDING):  aspirin Suppository 300 milliGRAM(s) Rectal daily  atorvastatin 80 milliGRAM(s) Oral at bedtime  dextrose 5% + sodium chloride 0.9%. 1000 milliLiter(s) (70 mL/Hr) IV Continuous <Continuous>  enoxaparin Injectable 40 milliGRAM(s) SubCutaneous daily  pantoprazole  Injectable 40 milliGRAM(s) IV Push daily    MEDICATIONS  (PRN):      Allergies    penicillin (Unknown)    Intolerances    REVIEW OF SYSTEMS:  CONSTITUTIONAL: No fever, weight loss, or fatigue  RESPIRATORY: No cough, wheezing, chills or hemoptysis; No shortness of breath  CARDIOVASCULAR: No chest pain, palpitations, dizziness, or leg swelling  GASTROINTESTINAL: No abdominal or epigastric pain. No nausea, vomiting, or hematemesis; No diarrhea or constipation. No melena or hematochezia.  NEUROLOGICAL: No headaches, memory loss, loss of strength, numbness, or tremors  SKIN: No itching, burning, rashes, or lesions     Vital Signs Last 24 Hrs  T(C): 37 (18 Jul 2018 15:31), Max: 37 (18 Jul 2018 15:31)  T(F): 98.6 (18 Jul 2018 15:31), Max: 98.6 (18 Jul 2018 15:31)  HR: 76 (18 Jul 2018 15:31) (70 - 84)  BP: 97/78 (18 Jul 2018 15:31) (92/59 - 135/65)  BP(mean): --  RR: 18 (18 Jul 2018 15:31) (17 - 18)  SpO2: 98% (18 Jul 2018 15:31) (93% - 98%)    PHYSICAL EXAM:  GENERAL: patient appears uncomfortable in bed  HEAD:  prominent facial droop  EYES: deviation of left eye  NECK: Supple, No JVD, Normal thyroid  CHEST/LUNG: Clear to percussion bilaterally; No rales, rhonchi, wheezing, or rubs  HEART: Regular rate and rhythm; No murmurs, rubs, or gallops  ABDOMEN: Soft, Nontender, Nondistended; Bowel sounds present  NERVOUS SYSTEM:  Alert & Oriented X3, Good concentration; patient feels weak on right side of body   EXTREMITIES:  2-3/5 motor strength of right upper and lower extremities. 5/5 on left extremities    LABS:                        13.9   8.3   )-----------( 231      ( 18 Jul 2018 07:01 )             42.3     07-18    142  |  108  |  13  ----------------------------<  98  3.8   |  26  |  0.95    Ca    8.8      18 Jul 2018 07:01  Phos  3.1     07-18  Mg     2.4     07-18    TPro  7.5  /  Alb  3.3<L>  /  TBili  1.1  /  DBili  x   /  AST  16  /  ALT  21  /  AlkPhos  109  07-17    PT/INR - ( 17 Jul 2018 09:14 )   PT: 12.4 sec;   INR: 1.13 ratio         PTT - ( 17 Jul 2018 09:14 )  PTT:29.1 sec    CAPILLARY BLOOD GLUCOSE          RADIOLOGY & ADDITIONAL TESTS:    Imaging Personally Reviewed:  [ ] YES  [ ] NO    Consultant(s) Notes Reviewed:  [ ] YES  [ ] NO

## 2018-07-18 NOTE — PROGRESS NOTE ADULT - SUBJECTIVE AND OBJECTIVE BOX
Patient is a 57y old  Female who presents with a chief complaint of weakness of right upper and le (17 Jul 2018 17:42)      INTERVAL HPI/OVERNIGHT EVENTS: seen and examined at bedside. Still has the weakness.     MEDICATIONS  (STANDING):  aspirin Suppository 300 milliGRAM(s) Rectal daily  atorvastatin 80 milliGRAM(s) Oral at bedtime  dextrose 5% + sodium chloride 0.9%. 1000 milliLiter(s) (70 mL/Hr) IV Continuous <Continuous>  enoxaparin Injectable 40 milliGRAM(s) SubCutaneous daily  pantoprazole  Injectable 40 milliGRAM(s) IV Push daily    MEDICATIONS  (PRN):      Allergies    penicillin (Unknown)    Intolerances        REVIEW OF SYSTEMS:  CONSTITUTIONAL: No fever, weight loss, or fatigue  RESPIRATORY: No cough, wheezing, chills or hemoptysis; No shortness of breath  CARDIOVASCULAR: No chest pain, palpitations, dizziness, or leg swelling  GASTROINTESTINAL: No abdominal or epigastric pain. No nausea, vomiting, or hematemesis; No diarrhea or constipation. No melena or hematochezia.  NEUROLOGICAL: right sided weakness and slurred speech.  No headaches, memory loss, loss of strength, numbness, or tremors  MUSCULOSKELETAL: No joint pain or swelling; No muscle, back, or extremity pain      Vital Signs Last 24 Hrs  T(C): 37 (18 Jul 2018 15:31), Max: 37 (18 Jul 2018 15:31)  T(F): 98.6 (18 Jul 2018 15:31), Max: 98.6 (18 Jul 2018 15:31)  HR: 76 (18 Jul 2018 15:31) (70 - 84)  BP: 97/78 (18 Jul 2018 15:31) (92/59 - 135/65)  BP(mean): --  RR: 18 (18 Jul 2018 15:31) (17 - 18)  SpO2: 98% (18 Jul 2018 15:31) (93% - 98%)    PHYSICAL EXAM:  GENERAL: NAD, well-groomed, well-developed  HEAD:  Atraumatic, Normocephalic  EYES:  conjunctiva and sclera clear  NECK: Supple, No JVD, Normal thyroid  NERVOUS SYSTEM: slurred speech and right sided weakness alone with droopy face,    CHEST/LUNG: Clear to percussion bilaterally; No rales, rhonchi, wheezing, or rubs  HEART: Regular rate and rhythm; No murmurs, rubs, or gallops  ABDOMEN: Soft, Nontender, Nondistended; Bowel sounds present  EXTREMITIES:  No clubbing, cyanosis, or edema  SKIN: No rashes or lesions    LABS:                        13.9   8.3   )-----------( 231      ( 18 Jul 2018 07:01 )             42.3     07-18    142  |  108  |  13  ----------------------------<  98  3.8   |  26  |  0.95    Ca    8.8      18 Jul 2018 07:01  Phos  3.1     07-18  Mg     2.4     07-18    TPro  7.5  /  Alb  3.3<L>  /  TBili  1.1  /  DBili  x   /  AST  16  /  ALT  21  /  AlkPhos  109  07-17    PT/INR - ( 17 Jul 2018 09:14 )   PT: 12.4 sec;   INR: 1.13 ratio         PTT - ( 17 Jul 2018 09:14 )  PTT:29.1 sec    CAPILLARY BLOOD GLUCOSE          RADIOLOGY & ADDITIONAL TESTS:    Imaging Personally Reviewed:  [x ] YES  [ ] NO    Consultant(s) Notes Reviewed:  [ x] YES  [ ] NO    Care Discussed with Consultants/Other Providers [x ] YES  [ ] NO    Plan of Care discussed with Housestaff [x ]YES [ ] NO

## 2018-07-19 LAB
ALBUMIN SERPL ELPH-MCNC: 2.9 G/DL — LOW (ref 3.5–5)
ALP SERPL-CCNC: 91 U/L — SIGNIFICANT CHANGE UP (ref 40–120)
ALT FLD-CCNC: 20 U/L DA — SIGNIFICANT CHANGE UP (ref 10–60)
ANION GAP SERPL CALC-SCNC: 4 MMOL/L — LOW (ref 5–17)
AST SERPL-CCNC: 14 U/L — SIGNIFICANT CHANGE UP (ref 10–40)
BASOPHILS # BLD AUTO: 0.1 K/UL — SIGNIFICANT CHANGE UP (ref 0–0.2)
BASOPHILS NFR BLD AUTO: 1.2 % — SIGNIFICANT CHANGE UP (ref 0–2)
BILIRUB SERPL-MCNC: 0.9 MG/DL — SIGNIFICANT CHANGE UP (ref 0.2–1.2)
BUN SERPL-MCNC: 10 MG/DL — SIGNIFICANT CHANGE UP (ref 7–18)
CALCIUM SERPL-MCNC: 8.7 MG/DL — SIGNIFICANT CHANGE UP (ref 8.4–10.5)
CHLORIDE SERPL-SCNC: 111 MMOL/L — HIGH (ref 96–108)
CO2 SERPL-SCNC: 27 MMOL/L — SIGNIFICANT CHANGE UP (ref 22–31)
CREAT SERPL-MCNC: 0.87 MG/DL — SIGNIFICANT CHANGE UP (ref 0.5–1.3)
EOSINOPHIL # BLD AUTO: 0.3 K/UL — SIGNIFICANT CHANGE UP (ref 0–0.5)
EOSINOPHIL NFR BLD AUTO: 3.8 % — SIGNIFICANT CHANGE UP (ref 0–6)
GLUCOSE SERPL-MCNC: 98 MG/DL — SIGNIFICANT CHANGE UP (ref 70–99)
HCT VFR BLD CALC: 41.8 % — SIGNIFICANT CHANGE UP (ref 34.5–45)
HCYS SERPL-MCNC: 8.8 UMOL/L — SIGNIFICANT CHANGE UP (ref 5–15)
HGB BLD-MCNC: 14.1 G/DL — SIGNIFICANT CHANGE UP (ref 11.5–15.5)
LYMPHOCYTES # BLD AUTO: 1.3 K/UL — SIGNIFICANT CHANGE UP (ref 1–3.3)
LYMPHOCYTES # BLD AUTO: 19 % — SIGNIFICANT CHANGE UP (ref 13–44)
MAGNESIUM SERPL-MCNC: 2.5 MG/DL — SIGNIFICANT CHANGE UP (ref 1.6–2.6)
MCHC RBC-ENTMCNC: 29.8 PG — SIGNIFICANT CHANGE UP (ref 27–34)
MCHC RBC-ENTMCNC: 33.8 GM/DL — SIGNIFICANT CHANGE UP (ref 32–36)
MCV RBC AUTO: 88.2 FL — SIGNIFICANT CHANGE UP (ref 80–100)
MONOCYTES # BLD AUTO: 0.5 K/UL — SIGNIFICANT CHANGE UP (ref 0–0.9)
MONOCYTES NFR BLD AUTO: 7.3 % — SIGNIFICANT CHANGE UP (ref 2–14)
NEUTROPHILS # BLD AUTO: 4.7 K/UL — SIGNIFICANT CHANGE UP (ref 1.8–7.4)
NEUTROPHILS NFR BLD AUTO: 68.7 % — SIGNIFICANT CHANGE UP (ref 43–77)
PHOSPHATE SERPL-MCNC: 2.7 MG/DL — SIGNIFICANT CHANGE UP (ref 2.5–4.5)
PLATELET # BLD AUTO: 191 K/UL — SIGNIFICANT CHANGE UP (ref 150–400)
POTASSIUM SERPL-MCNC: 4 MMOL/L — SIGNIFICANT CHANGE UP (ref 3.5–5.3)
POTASSIUM SERPL-SCNC: 4 MMOL/L — SIGNIFICANT CHANGE UP (ref 3.5–5.3)
PROT SERPL-MCNC: 6.2 G/DL — SIGNIFICANT CHANGE UP (ref 6–8.3)
RBC # BLD: 4.74 M/UL — SIGNIFICANT CHANGE UP (ref 3.8–5.2)
RBC # FLD: 13 % — SIGNIFICANT CHANGE UP (ref 10.3–14.5)
SODIUM SERPL-SCNC: 142 MMOL/L — SIGNIFICANT CHANGE UP (ref 135–145)
WBC # BLD: 6.8 K/UL — SIGNIFICANT CHANGE UP (ref 3.8–10.5)
WBC # FLD AUTO: 6.8 K/UL — SIGNIFICANT CHANGE UP (ref 3.8–10.5)

## 2018-07-19 PROCEDURE — 99233 SBSQ HOSP IP/OBS HIGH 50: CPT | Mod: GC

## 2018-07-19 PROCEDURE — 99232 SBSQ HOSP IP/OBS MODERATE 35: CPT | Mod: 25

## 2018-07-19 PROCEDURE — G0452: CPT | Mod: 26

## 2018-07-19 PROCEDURE — 93312 ECHO TRANSESOPHAGEAL: CPT | Mod: 26

## 2018-07-19 RX ORDER — SODIUM CHLORIDE 9 MG/ML
1000 INJECTION, SOLUTION INTRAVENOUS
Qty: 0 | Refills: 0 | Status: DISCONTINUED | OUTPATIENT
Start: 2018-07-19 | End: 2018-07-23

## 2018-07-19 RX ORDER — ASPIRIN/CALCIUM CARB/MAGNESIUM 324 MG
81 TABLET ORAL DAILY
Qty: 0 | Refills: 0 | Status: DISCONTINUED | OUTPATIENT
Start: 2018-07-20 | End: 2018-07-25

## 2018-07-19 RX ORDER — ATORVASTATIN CALCIUM 80 MG/1
1 TABLET, FILM COATED ORAL
Qty: 30 | Refills: 0 | OUTPATIENT
Start: 2018-07-19 | End: 2018-08-17

## 2018-07-19 RX ORDER — ASPIRIN/CALCIUM CARB/MAGNESIUM 324 MG
1 TABLET ORAL
Qty: 30 | Refills: 0 | OUTPATIENT
Start: 2018-07-19 | End: 2018-08-17

## 2018-07-19 RX ORDER — DIPHENHYDRAMINE HCL 50 MG
25 CAPSULE ORAL EVERY 4 HOURS
Qty: 0 | Refills: 0 | Status: DISCONTINUED | OUTPATIENT
Start: 2018-07-19 | End: 2018-07-25

## 2018-07-19 RX ADMIN — PANTOPRAZOLE SODIUM 40 MILLIGRAM(S): 20 TABLET, DELAYED RELEASE ORAL at 13:19

## 2018-07-19 RX ADMIN — ATORVASTATIN CALCIUM 80 MILLIGRAM(S): 80 TABLET, FILM COATED ORAL at 21:02

## 2018-07-19 RX ADMIN — Medication 300 MILLIGRAM(S): at 13:19

## 2018-07-19 RX ADMIN — ENOXAPARIN SODIUM 40 MILLIGRAM(S): 100 INJECTION SUBCUTANEOUS at 13:19

## 2018-07-19 RX ADMIN — SODIUM CHLORIDE 70 MILLILITER(S): 9 INJECTION, SOLUTION INTRAVENOUS at 21:03

## 2018-07-19 NOTE — SWALLOW BEDSIDE ASSESSMENT ADULT - SWALLOW EVAL: RECOMMENDED FEEDING/EATING TECHNIQUES
maintain upright posture during/after eating for 30 mins/oral hygiene/small sips/bites/position upright (90 degrees)/allow for swallow between intakes/check mouth frequently for oral residue/pocketing/hard swallow w/ each bite or sip/alternate food with liquid
allow for swallow between intakes/oral hygiene/small sips/bites/check mouth frequently for oral residue/pocketing/maintain upright posture during/after eating for 30 mins/turn head right/alternate food with liquid/hard swallow w/ each bite or sip/position upright (90 degrees)

## 2018-07-19 NOTE — PROGRESS NOTE ADULT - ASSESSMENT
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 CVA: Aspirin and statin, care per neurology  -MRI shows small infarct  -Echo with bubble study pending; review of preliminary images at bedside showed preserved EF, negative bubble study, no evidence of embolus  -Will plan for JOSHUA tomorrow (pending MRI results). Please keep patient NPO after midnight.  -If JOSHUA is negative, patient will need loop recorder placed. Can see Dr. Baeza at [phone].   -Patient and  are agreeable with above plan    2. 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 CVA: Aspirin and statin, care per neurology  -MRI shows small infarct  -Echo with bubble study pending; review of preliminary images at bedside showed preserved EF, negative bubble study, no evidence of embolus  -Limited JOSHUA was negative for LA or IVA thrombus  -Patient will need loop recorder placed. Can see Dr. Baeza as outpatient at [phone].     2. HLD: On atorvastatin    ***Note that this is a preliminary note and any recommendations should NOT be carried out until this note is finalized. 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 CVA: Aspirin and statin, care per neurology  -MRI shows small infarct  -Echo with bubble study pending; review of preliminary images at bedside showed preserved EF, negative bubble study, no evidence of embolus  -Limited JOSHUA was negative for LA or IVA thrombus  -Patient will need loop recorder placed. Can see Dr. Baeza at [phone].     2. HLD: On atorvastatin

## 2018-07-19 NOTE — SWALLOW BEDSIDE ASSESSMENT ADULT - SLP PERTINENT HISTORY OF CURRENT PROBLEM
56 y/o F with PMHx of Breast CA (s/p Lumpectomy in 2013 ; no chemo/ radiation), former smoker, from home, lives alone. Admitted on 7/17/18 for complaint of right upper and lower extremity weakness and numbness with difficulty speaking, symptom onset >12 hours. 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

## 2018-07-19 NOTE — SWALLOW BEDSIDE ASSESSMENT ADULT - SWALLOW EVAL: DIAGNOSIS
Pt p/w s&s of oropharyngeal dysphagia c/b prolonged mastication & slow AP transport for mech. soft and reduced hyolaryngeal elevation for mech. soft & thin liquids. No overt s&s of penetration/aspiration observed.

## 2018-07-19 NOTE — SWALLOW BEDSIDE ASSESSMENT ADULT - ASR SWALLOW DENTITION
upper dentures/As per pt, lower dentures at home
incomplete/As per Pt, has both upper and lower dentures, but at present not with her

## 2018-07-19 NOTE — PROGRESS NOTE ADULT - SUBJECTIVE AND OBJECTIVE BOX
PRESENTING CC:    SUBJ:     In summary, this is a 58 yo F former smoker with BrCA s/p lumpectomy who presented with right-sided weakness in setting of suspected embolic stroke.     Overnight, there were no acute events. Patient was kept NPO for JOSHUA. Shortly after start of procedure, patient started desaturating (? bronchospasm) requiring removal of JOSHUA probe and bag-valve-mask ventilation, after which patient's O2 saturation improved; she was monitored without further issues. Sufficient images were acquired before the JOSHUA probe was removed and decision was made not to try procedure again.       ----------------------  PMH:  Breast cancer: s/p Lumpectomy    Allergies    penicillin (Unknown)      MEDICATIONS  (STANDING):  aspirin Suppository 300 milliGRAM(s) Rectal daily  atorvastatin 80 milliGRAM(s) Oral at bedtime  dextrose 5% + sodium chloride 0.9%. 1000 milliLiter(s) (70 mL/Hr) IV Continuous <Continuous>  enoxaparin Injectable 40 milliGRAM(s) SubCutaneous daily  pantoprazole  Injectable 40 milliGRAM(s) IV Push daily    MEDICATIONS  (PRN):  diphenhydrAMINE   Capsule 25 milliGRAM(s) Oral every 4 hours PRN Rash and/or Itching      FAMILY HISTORY:  No pertinent family history in first degree relatives    Reviewed; no change from my prior note    SOCIAL HISTORY:  Reviewed, no change from my prior note    REVIEW OF SYSTEMS:  Constitutional: [ ] fever, [ ]weight loss,  [ ]fatigue  Eyes: [ ] visual changes  Respiratory: [ ]shortness of breath;  [ ] cough, [ ]wheezing, [ ]chills, [ ]hemoptysis  Cardiovascular: [ ] chest pain, [ ]palpitations, [ ]dizziness,  [ ]leg swelling [ ]syncope  Gastrointestinal: [ ] abdominal pain, [ ]nausea, [ ]vomiting,  [ ]diarrhea   Genitourinary: [ ] dysuria, [ ] hematuria  Neurologic: [ ] headaches [ ] tremors [ ] weakness [ ] lightheadedness  Skin: [ ] itching, [ ]burning, [ ] rashes  Endocrine: [ ] heat or cold intolerance  Musculoskeletal: [ ] joint pain or swelling; [ ] muscle, back, or extremity pain  Psychiatric: [ ] depression, [ ]anxiety, [ ]mood swings, or [ ]difficulty sleeping  Hematologic: [ ] easy bruising, [ ] bleeding gums    [x] All remaining systems negative except as per above.   [  ] Unable to obtain    Vital Signs Last 24 Hrs  T(C): 37.1 (19 Jul 2018 08:12), Max: 37.1 (19 Jul 2018 08:12)  T(F): 98.8 (19 Jul 2018 08:12), Max: 98.8 (19 Jul 2018 08:12)  HR: 68 (19 Jul 2018 08:12) (68 - 86)  BP: 109/53 (19 Jul 2018 08:12) (91/51 - 109/53)  BP(mean): --  RR: 18 (19 Jul 2018 08:12) (18 - 18)  SpO2: 96% (19 Jul 2018 08:12) (96% - 98%)  I&O's Summary    18 Jul 2018 07:01  -  19 Jul 2018 07:00  --------------------------------------------------------  IN: 770 mL / OUT: 300 mL / NET: 470 mL        PHYSICAL EXAM:  General: No acute distress; more awake today  HEENT: EOMI, PERRL  Neck: Supple, No JVD  Lungs: Clear to auscultation bilaterally; No rales or wheezing  Heart: Regular rate and rhythm; No murmurs, rubs, or gallops  Abdomen: Nontender, bowel sounds present  Extremities: No clubbing, cyanosis, or edema  Nervous system:  Alert & Oriented X3, right-sided UE + LE weakness  Psychiatric: Normal affect    LABS:  07-18    142  |  108  |  13  ----------------------------<  98  3.8   |  26  |  0.95    Ca    8.8      18 Jul 2018 07:01  Phos  3.1     07-18  Mg     2.4     07-18    TPro  7.5  /  Alb  3.3<L>  /  TBili  1.1  /  DBili  x   /  AST  16  /  ALT  21  /  AlkPhos  109  07-17    Creatinine Trend: 0.95<--, 1.03<--                        13.9   8.3   )-----------( 231      ( 18 Jul 2018 07:01 )             42.3     PT/INR - ( 17 Jul 2018 09:14 )   PT: 12.4 sec;   INR: 1.13 ratio         PTT - ( 17 Jul 2018 09:14 )  PTT:29.1 sec  Lipid Panel:   Cardiac Enzymes: CARDIAC MARKERS ( 17 Jul 2018 14:06 )  <0.015 ng/mL / x     / 88 U/L / x     / 1.1 ng/mL  CARDIAC MARKERS ( 17 Jul 2018 09:14 )  <0.015 ng/mL / x     / x     / x     / x              RADIOLOGY: < from: MR Head No Cont (07.18.18 @ 16:59) >  IMPRESSION:  Incomplete exam.    Small acute infarct left thalamus.    No acute intracranial hemorrhage    < end of copied text >    TELEMETRY:    ECHO: < from: JOSHUA w/Doppler (07.19.18 @ 12:11) >    CONCLUSIONS:  1. No left atrial or left atrial appendage thrombus. Left  atrial appendage velocity is normal at  0.54 m/s.    < end of copied text >    < from: Transthoracic Echocardiogram (07.18.18 @ 11:00) >  CONCLUSIONS:  1. Normal mitral valve. Trace mitral regurgitation.  2. Normal trileaflet aortic valve. Mild aortic  insufficiency.  3. Aortic Root: 3.3 cm.    4. Mild left atrial enlargement.  5. Normal left ventricular internal dimensions and wall  thicknesses.  6. Normal Left Ventricular Systolic Function,  (EF = 55 to  60%)  7. Grade I diastolic dysfunction (Impaired relaxation).  8. Normal right atrium.  9. Normal right ventricular size and function.  10. RV systolic pressure is 26 mm Hg.  11. Normal tricuspid valve.  12. Normal pulmonic valve.  13. Normal pericardium with no pericardial effusion.    < end of copied text > PRESENTING CC: Weakness    SUBJ:     In summary, this is a 58 yo F former smoker with BrCA s/p lumpectomy who presented with right-sided weakness in setting of suspected embolic stroke.     Overnight, there were no acute events. Patient was kept NPO for JOSHUA. Shortly after start of procedure, patient started desaturating (? bronchospasm) requiring removal of JOSHUA probe and bag-valve-mask ventilation, after which patient's O2 saturation improved; she was monitored without further issues. Sufficient images were acquired before the JOSHUA probe was removed and decision was made not to try procedure again.       ----------------------  PMH:  Breast cancer: s/p Lumpectomy    Allergies    penicillin (Unknown)      MEDICATIONS  (STANDING):  aspirin Suppository 300 milliGRAM(s) Rectal daily  atorvastatin 80 milliGRAM(s) Oral at bedtime  dextrose 5% + sodium chloride 0.9%. 1000 milliLiter(s) (70 mL/Hr) IV Continuous <Continuous>  enoxaparin Injectable 40 milliGRAM(s) SubCutaneous daily  pantoprazole  Injectable 40 milliGRAM(s) IV Push daily    MEDICATIONS  (PRN):  diphenhydrAMINE   Capsule 25 milliGRAM(s) Oral every 4 hours PRN Rash and/or Itching      FAMILY HISTORY:  No pertinent family history in first degree relatives    Reviewed; no change from my prior note    SOCIAL HISTORY:  Reviewed, no change from my prior note    REVIEW OF SYSTEMS:  Constitutional: [ ] fever, [ ]weight loss,  [ ]fatigue  Eyes: [ ] visual changes  Respiratory: [ ]shortness of breath;  [ ] cough, [ ]wheezing, [ ]chills, [ ]hemoptysis  Cardiovascular: [ ] chest pain, [ ]palpitations, [ ]dizziness,  [ ]leg swelling [ ]syncope  Gastrointestinal: [ ] abdominal pain, [ ]nausea, [ ]vomiting,  [ ]diarrhea   Genitourinary: [ ] dysuria, [ ] hematuria  Neurologic: [ ] headaches [ ] tremors [ ] weakness [ ] lightheadedness  Skin: [ ] itching, [ ]burning, [ ] rashes  Endocrine: [ ] heat or cold intolerance  Musculoskeletal: [ ] joint pain or swelling; [ ] muscle, back, or extremity pain  Psychiatric: [ ] depression, [ ]anxiety, [ ]mood swings, or [ ]difficulty sleeping  Hematologic: [ ] easy bruising, [ ] bleeding gums    [x] All remaining systems negative except as per above.   [  ] Unable to obtain    Vital Signs Last 24 Hrs  T(C): 37.1 (19 Jul 2018 08:12), Max: 37.1 (19 Jul 2018 08:12)  T(F): 98.8 (19 Jul 2018 08:12), Max: 98.8 (19 Jul 2018 08:12)  HR: 68 (19 Jul 2018 08:12) (68 - 86)  BP: 109/53 (19 Jul 2018 08:12) (91/51 - 109/53)  BP(mean): --  RR: 18 (19 Jul 2018 08:12) (18 - 18)  SpO2: 96% (19 Jul 2018 08:12) (96% - 98%)  I&O's Summary    18 Jul 2018 07:01  -  19 Jul 2018 07:00  --------------------------------------------------------  IN: 770 mL / OUT: 300 mL / NET: 470 mL        PHYSICAL EXAM:  General: No acute distress; more awake today  HEENT: EOMI, PERRL  Neck: Supple, No JVD  Lungs: Clear to auscultation bilaterally; No rales or wheezing  Heart: Regular rate and rhythm; No murmurs, rubs, or gallops  Abdomen: Nontender, bowel sounds present  Extremities: No clubbing, cyanosis, or edema  Nervous system:  Alert & Oriented X3, right-sided UE + LE weakness  Psychiatric: Normal affect    LABS:  07-18    142  |  108  |  13  ----------------------------<  98  3.8   |  26  |  0.95    Ca    8.8      18 Jul 2018 07:01  Phos  3.1     07-18  Mg     2.4     07-18    TPro  7.5  /  Alb  3.3<L>  /  TBili  1.1  /  DBili  x   /  AST  16  /  ALT  21  /  AlkPhos  109  07-17    Creatinine Trend: 0.95<--, 1.03<--                        13.9   8.3   )-----------( 231      ( 18 Jul 2018 07:01 )             42.3     PT/INR - ( 17 Jul 2018 09:14 )   PT: 12.4 sec;   INR: 1.13 ratio         PTT - ( 17 Jul 2018 09:14 )  PTT:29.1 sec  Lipid Panel:   Cardiac Enzymes: CARDIAC MARKERS ( 17 Jul 2018 14:06 )  <0.015 ng/mL / x     / 88 U/L / x     / 1.1 ng/mL  CARDIAC MARKERS ( 17 Jul 2018 09:14 )  <0.015 ng/mL / x     / x     / x     / x              RADIOLOGY: < from: MR Head No Cont (07.18.18 @ 16:59) >  IMPRESSION:  Incomplete exam.    Small acute infarct left thalamus.    No acute intracranial hemorrhage    < end of copied text >    TELEMETRY:    ECHO: < from: JOSHUA w/Doppler (07.19.18 @ 12:11) >    CONCLUSIONS:  1. No left atrial or left atrial appendage thrombus. Left  atrial appendage velocity is normal at  0.54 m/s.    < end of copied text >    < from: Transthoracic Echocardiogram (07.18.18 @ 11:00) >  CONCLUSIONS:  1. Normal mitral valve. Trace mitral regurgitation.  2. Normal trileaflet aortic valve. Mild aortic  insufficiency.  3. Aortic Root: 3.3 cm.    4. Mild left atrial enlargement.  5. Normal left ventricular internal dimensions and wall  thicknesses.  6. Normal Left Ventricular Systolic Function,  (EF = 55 to  60%)  7. Grade I diastolic dysfunction (Impaired relaxation).  8. Normal right atrium.  9. Normal right ventricular size and function.  10. RV systolic pressure is 26 mm Hg.  11. Normal tricuspid valve.  12. Normal pulmonic valve.  13. Normal pericardium with no pericardial effusion.    < end of copied text > PRESENTING CC: Weakness    SUBJ:     In summary, this is a 58 yo F former smoker with BrCA s/p lumpectomy who presented with right-sided weakness in setting of suspected embolic stroke.     Overnight, there were no acute events. Patient was kept NPO for JOSHUA. Shortly after start of procedure, patient started desaturating (? bronchospasm) requiring removal of JOSHUA probe and bag-valve-mask ventilation, after which patient's O2 saturation improved; she was monitored without further issues. Sufficient images were acquired before the JOSHUA probe was removed and decision was made not to try procedure again.       ----------------------  PMH:  Breast cancer: s/p Lumpectomy    Allergies    penicillin (Unknown)      MEDICATIONS  (STANDING):  aspirin Suppository 300 milliGRAM(s) Rectal daily  atorvastatin 80 milliGRAM(s) Oral at bedtime  dextrose 5% + sodium chloride 0.9%. 1000 milliLiter(s) (70 mL/Hr) IV Continuous <Continuous>  enoxaparin Injectable 40 milliGRAM(s) SubCutaneous daily  pantoprazole  Injectable 40 milliGRAM(s) IV Push daily    MEDICATIONS  (PRN):  diphenhydrAMINE   Capsule 25 milliGRAM(s) Oral every 4 hours PRN Rash and/or Itching      FAMILY HISTORY:  No pertinent family history in first degree relatives    Reviewed; no change from my prior note    SOCIAL HISTORY:  Reviewed, no change from my prior note    REVIEW OF SYSTEMS:  Constitutional: [ ] fever, [ ]weight loss,  [ ]fatigue  Eyes: [ ] visual changes  Respiratory: [ ]shortness of breath;  [ ] cough, [ ]wheezing, [ ]chills, [ ]hemoptysis  Cardiovascular: [ ] chest pain, [ ]palpitations, [ ]dizziness,  [ ]leg swelling [ ]syncope  Gastrointestinal: [ ] abdominal pain, [ ]nausea, [ ]vomiting,  [ ]diarrhea   Genitourinary: [ ] dysuria, [ ] hematuria  Neurologic: [ ] headaches [ ] tremors [ ] weakness [ ] lightheadedness  Skin: [ ] itching, [ ]burning, [ ] rashes  Endocrine: [ ] heat or cold intolerance  Musculoskeletal: [ ] joint pain or swelling; [ ] muscle, back, or extremity pain  Psychiatric: [ ] depression, [ ]anxiety, [ ]mood swings, or [ ]difficulty sleeping  Hematologic: [ ] easy bruising, [ ] bleeding gums    [x] All remaining systems negative except as per above.   [  ] Unable to obtain    Vital Signs Last 24 Hrs  T(C): 37.1 (19 Jul 2018 08:12), Max: 37.1 (19 Jul 2018 08:12)  T(F): 98.8 (19 Jul 2018 08:12), Max: 98.8 (19 Jul 2018 08:12)  HR: 68 (19 Jul 2018 08:12) (68 - 86)  BP: 109/53 (19 Jul 2018 08:12) (91/51 - 109/53)  BP(mean): --  RR: 18 (19 Jul 2018 08:12) (18 - 18)  SpO2: 96% (19 Jul 2018 08:12) (96% - 98%)  I&O's Summary    18 Jul 2018 07:01  -  19 Jul 2018 07:00  --------------------------------------------------------  IN: 770 mL / OUT: 300 mL / NET: 470 mL        PHYSICAL EXAM:  General: No acute distress; more awake today  HEENT: EOMI, PERRL  Neck: Supple, No JVD  Lungs: Clear to auscultation bilaterally; No rales or wheezing  Heart: Regular rate and rhythm; No murmurs, rubs, or gallops  Abdomen: Nontender, bowel sounds present  Extremities: No clubbing, cyanosis, or edema  Nervous system:  Alert & Oriented X3, right-sided UE + LE weakness  Psychiatric: Normal affect    LABS:  07-18    142  |  108  |  13  ----------------------------<  98  3.8   |  26  |  0.95    Ca    8.8      18 Jul 2018 07:01  Phos  3.1     07-18  Mg     2.4     07-18    TPro  7.5  /  Alb  3.3<L>  /  TBili  1.1  /  DBili  x   /  AST  16  /  ALT  21  /  AlkPhos  109  07-17    Creatinine Trend: 0.95<--, 1.03<--                        13.9   8.3   )-----------( 231      ( 18 Jul 2018 07:01 )             42.3     PT/INR - ( 17 Jul 2018 09:14 )   PT: 12.4 sec;   INR: 1.13 ratio         PTT - ( 17 Jul 2018 09:14 )  PTT:29.1 sec  Lipid Panel:   Cardiac Enzymes: CARDIAC MARKERS ( 17 Jul 2018 14:06 )  <0.015 ng/mL / x     / 88 U/L / x     / 1.1 ng/mL  CARDIAC MARKERS ( 17 Jul 2018 09:14 )  <0.015 ng/mL / x     / x     / x     / x              RADIOLOGY: < from: MR Head No Cont (07.18.18 @ 16:59) >  IMPRESSION:  Incomplete exam.    Small acute infarct left thalamus.    No acute intracranial hemorrhage    < end of copied text >    ECHO: < from: JOSHUA w/Doppler (07.19.18 @ 12:11) >    CONCLUSIONS:  1. No left atrial or left atrial appendage thrombus. Left  atrial appendage velocity is normal at  0.54 m/s.    < end of copied text >    < from: Transthoracic Echocardiogram (07.18.18 @ 11:00) >  CONCLUSIONS:  1. Normal mitral valve. Trace mitral regurgitation.  2. Normal trileaflet aortic valve. Mild aortic  insufficiency.  3. Aortic Root: 3.3 cm.    4. Mild left atrial enlargement.  5. Normal left ventricular internal dimensions and wall  thicknesses.  6. Normal Left Ventricular Systolic Function,  (EF = 55 to  60%)  7. Grade I diastolic dysfunction (Impaired relaxation).  8. Normal right atrium.  9. Normal right ventricular size and function.  10. RV systolic pressure is 26 mm Hg.  11. Normal tricuspid valve.  12. Normal pulmonic valve.  13. Normal pericardium with no pericardial effusion.    < end of copied text >

## 2018-07-19 NOTE — SWALLOW BEDSIDE ASSESSMENT ADULT - COMMENTS
Pt seen for bedside swallow re-evaluation for possible advancement of diet. Pt previously seen by speech & swallow on 7/17/18, recommended mechanical soft & nectar-thick liquids as pt p/w overt s&s of penetration/aspiration on thin liquids. At this eval, HOB elevated to 90 degrees, Pt A,A+Ox4, able to follow directives and respond to questions regarding current status. Consistent w/ prior visit, pt p/w mild dysarthric speech 2/2 R-side muscle weakness s/p CVA. Pt indicated weakness on R-side during swallowing. Pt indicated R-side weakness in throat following CVA. Instructed Pt on R-side head-turn to compensate; Pt reported improved efficiency of swallow with head turn.

## 2018-07-19 NOTE — PROGRESS NOTE ADULT - SUBJECTIVE AND OBJECTIVE BOX
PGY1 Note discussed with supervising resident and primary attending.    Patient is a 57y old  Female who presents with a chief complaint of weakness of right upper and le (17 Jul 2018 17:42)      INTERVAL HPI/OVERNIGHT EVENTS:    Ms. Banegas is seen at the beside. She reports that she feels unchanged from when she first presented to the hospital. She says she still has difficulty speaking and swallowing. She reports that she feels continued weakness on her right side.    MEDICATIONS  (STANDING):  aspirin Suppository 300 milliGRAM(s) Rectal daily  atorvastatin 80 milliGRAM(s) Oral at bedtime  dextrose 5% + sodium chloride 0.9%. 1000 milliLiter(s) (70 mL/Hr) IV Continuous <Continuous>  enoxaparin Injectable 40 milliGRAM(s) SubCutaneous daily  pantoprazole  Injectable 40 milliGRAM(s) IV Push daily    Allergies    penicillin (Unknown)    Intolerances    REVIEW OF SYSTEMS:  CONSTITUTIONAL: No fever, weight loss, or fatigue  RESPIRATORY: No cough, wheezing, chills or hemoptysis; No shortness of breath  CARDIOVASCULAR: No chest pain, palpitations, dizziness, or leg swelling  GASTROINTESTINAL: No abdominal or epigastric pain. No nausea, vomiting, or hematemesis; No diarrhea or constipation. No melena or hematochezia.  NEUROLOGICAL: No headaches, memory loss, loss of strength, numbness, or tremors  SKIN: No itching, burning, rashes, or lesions     Vital Signs Last 24 Hrs  T(C): 37.1 (19 Jul 2018 15:59), Max: 37.1 (19 Jul 2018 08:12)  T(F): 98.8 (19 Jul 2018 15:59), Max: 98.8 (19 Jul 2018 08:12)  HR: 87 (19 Jul 2018 15:59) (68 - 91)  BP: 98/51 (19 Jul 2018 15:59) (91/51 - 115/55)  BP(mean): --  RR: 18 (19 Jul 2018 15:59) (18 - 18)  SpO2: 97% (19 Jul 2018 15:59) (94% - 97%)    PHYSICAL EXAM:  GENERAL: patient appears uncomfortable in bed  HEAD:  prominent facial droop  EYES: deviation of left eye  NECK: Supple, No JVD, Normal thyroid  CHEST/LUNG: Clear to percussion bilaterally; No rales, rhonchi, wheezing, or rubs  HEART: Regular rate and rhythm; No murmurs, rubs, or gallops  ABDOMEN: Soft, Nontender, Nondistended; Bowel sounds present  NERVOUS SYSTEM:  Alert & Oriented X3, Good concentration; patient feels weak on right side of body   EXTREMITIES:  2-3/5 motor strength of right upper and lower extremities. 5/5 on left extremities                          14.1   6.8   )-----------( 191      ( 19 Jul 2018 10:50 )             41.8     07-19    142  |  111<H>  |  10  ----------------------------<  98  4.0   |  27  |  0.87    Ca    8.7      19 Jul 2018 10:50  Phos  2.7     07-19  Mg     2.5     07-19    TPro  6.2  /  Alb  2.9<L>  /  TBili  0.9  /  DBili  x   /  AST  14  /  ALT  20  /  AlkPhos  91  07-19       PTT - ( 17 Jul 2018 09:14 )  PTT:29.1 sec    MR:      IMPRESSION:  Incomplete exam.    Small acute infarct left thalamus.    No acute intracranial hemorrhage.    JOSHUA:    CONCLUSIONS:  1. No left atrial or left atrial appendage thrombus. Left  atrial appendage velocity is normal at  0.54 m/s.    *** Compared with echocardiogram of 7/18/2018, a limited  JOSHUA was performed and additional information provided.

## 2018-07-19 NOTE — SWALLOW BEDSIDE ASSESSMENT ADULT - ASR SWALLOW ASPIRATION MONITOR
cough/gurgly voice/upper respiratory infection/change of breathing pattern/fever/pneumonia/oral hygiene/position upright (90Y)/throat clearing
cough/change of breathing pattern/position upright (90Y)/throat clearing/oral hygiene/fever/gurgly voice/pneumonia/upper respiratory infection

## 2018-07-19 NOTE — PROGRESS NOTE ADULT - ASSESSMENT
Ms. Naila Banegas is a 57 year old female with PMH of breast CA s/p lumpectomy who came from home with complaint of right upper and lower extremity weakness, as well as symptoms of slurred speech and facial droop. Patient initially went to sleep after onset of symptoms hoping they would resolve, but awoke to find they had worsened. Patient was admitted for high suspicion of CVA. CT head showed left thalamic acute/subacute infarct. MRI shows acute infarct of thalamus extending to midbrain and multiple chronic infarcts. JOSHUA negative. Plan for outpatient loop recorder. Patient agrees to acute rehab.  notified and tells us authorization needs to be obtained, and patient will have to wait until tomorrow for final clearance. Patient is clinically stable at this time and pending acute rehab placement.

## 2018-07-20 LAB
ALBUMIN SERPL ELPH-MCNC: 2.6 G/DL — LOW (ref 3.5–5)
ALP SERPL-CCNC: 86 U/L — SIGNIFICANT CHANGE UP (ref 40–120)
ALT FLD-CCNC: 26 U/L DA — SIGNIFICANT CHANGE UP (ref 10–60)
ANION GAP SERPL CALC-SCNC: 6 MMOL/L — SIGNIFICANT CHANGE UP (ref 5–17)
AST SERPL-CCNC: 23 U/L — SIGNIFICANT CHANGE UP (ref 10–40)
AT III ACT/NOR PPP CHRO: 100 % — SIGNIFICANT CHANGE UP (ref 85–135)
BASOPHILS # BLD AUTO: 0.1 K/UL — SIGNIFICANT CHANGE UP (ref 0–0.2)
BASOPHILS NFR BLD AUTO: 0.7 % — SIGNIFICANT CHANGE UP (ref 0–2)
BILIRUB SERPL-MCNC: 1.6 MG/DL — HIGH (ref 0.2–1.2)
BUN SERPL-MCNC: 9 MG/DL — SIGNIFICANT CHANGE UP (ref 7–18)
CALCIUM SERPL-MCNC: 8.3 MG/DL — LOW (ref 8.4–10.5)
CHLORIDE SERPL-SCNC: 110 MMOL/L — HIGH (ref 96–108)
CO2 SERPL-SCNC: 26 MMOL/L — SIGNIFICANT CHANGE UP (ref 22–31)
CREAT SERPL-MCNC: 0.86 MG/DL — SIGNIFICANT CHANGE UP (ref 0.5–1.3)
DRVVT SCREEN TO CONFIRM RATIO: SIGNIFICANT CHANGE UP
DSDNA AB SER-ACNC: 15 IU/ML — SIGNIFICANT CHANGE UP
EOSINOPHIL # BLD AUTO: 0.2 K/UL — SIGNIFICANT CHANGE UP (ref 0–0.5)
EOSINOPHIL NFR BLD AUTO: 2.3 % — SIGNIFICANT CHANGE UP (ref 0–6)
GLUCOSE SERPL-MCNC: 102 MG/DL — HIGH (ref 70–99)
HCT VFR BLD CALC: 37 % — SIGNIFICANT CHANGE UP (ref 34.5–45)
HGB BLD-MCNC: 12.2 G/DL — SIGNIFICANT CHANGE UP (ref 11.5–15.5)
LA NT DPL PPP QL: 32.3 SEC — SIGNIFICANT CHANGE UP
LYMPHOCYTES # BLD AUTO: 1.5 K/UL — SIGNIFICANT CHANGE UP (ref 1–3.3)
LYMPHOCYTES # BLD AUTO: 13.9 % — SIGNIFICANT CHANGE UP (ref 13–44)
MAGNESIUM SERPL-MCNC: 2.3 MG/DL — SIGNIFICANT CHANGE UP (ref 1.6–2.6)
MCHC RBC-ENTMCNC: 29 PG — SIGNIFICANT CHANGE UP (ref 27–34)
MCHC RBC-ENTMCNC: 33 GM/DL — SIGNIFICANT CHANGE UP (ref 32–36)
MCV RBC AUTO: 87.9 FL — SIGNIFICANT CHANGE UP (ref 80–100)
MONOCYTES # BLD AUTO: 0.8 K/UL — SIGNIFICANT CHANGE UP (ref 0–0.9)
MONOCYTES NFR BLD AUTO: 7.2 % — SIGNIFICANT CHANGE UP (ref 2–14)
NEUTROPHILS # BLD AUTO: 8 K/UL — HIGH (ref 1.8–7.4)
NEUTROPHILS NFR BLD AUTO: 75.9 % — SIGNIFICANT CHANGE UP (ref 43–77)
NORMALIZED SCT PPP-RTO: 0.85 RATIO — SIGNIFICANT CHANGE UP (ref 0–1.16)
NORMALIZED SCT PPP-RTO: SIGNIFICANT CHANGE UP
PHOSPHATE SERPL-MCNC: 2.3 MG/DL — LOW (ref 2.5–4.5)
PLATELET # BLD AUTO: 190 K/UL — SIGNIFICANT CHANGE UP (ref 150–400)
POTASSIUM SERPL-MCNC: 4 MMOL/L — SIGNIFICANT CHANGE UP (ref 3.5–5.3)
POTASSIUM SERPL-SCNC: 4 MMOL/L — SIGNIFICANT CHANGE UP (ref 3.5–5.3)
PROT SERPL-MCNC: 5.8 G/DL — LOW (ref 6–8.3)
RBC # BLD: 4.21 M/UL — SIGNIFICANT CHANGE UP (ref 3.8–5.2)
RBC # FLD: 13 % — SIGNIFICANT CHANGE UP (ref 10.3–14.5)
SODIUM SERPL-SCNC: 142 MMOL/L — SIGNIFICANT CHANGE UP (ref 135–145)
WBC # BLD: 10.6 K/UL — HIGH (ref 3.8–10.5)
WBC # FLD AUTO: 10.6 K/UL — HIGH (ref 3.8–10.5)

## 2018-07-20 PROCEDURE — 99232 SBSQ HOSP IP/OBS MODERATE 35: CPT | Mod: GC

## 2018-07-20 RX ADMIN — PANTOPRAZOLE SODIUM 40 MILLIGRAM(S): 20 TABLET, DELAYED RELEASE ORAL at 11:49

## 2018-07-20 RX ADMIN — ENOXAPARIN SODIUM 40 MILLIGRAM(S): 100 INJECTION SUBCUTANEOUS at 11:49

## 2018-07-20 RX ADMIN — ATORVASTATIN CALCIUM 80 MILLIGRAM(S): 80 TABLET, FILM COATED ORAL at 20:42

## 2018-07-20 RX ADMIN — Medication 81 MILLIGRAM(S): at 11:49

## 2018-07-20 NOTE — PROGRESS NOTE ADULT - ASSESSMENT
Ms. Naila Banegas is a 57 year old female with PMH of breast CA s/p lumpectomy who came from home with complaint of right upper and lower extremity weakness, as well as symptoms of slurred speech and facial droop.

## 2018-07-20 NOTE — PROGRESS NOTE ADULT - NSHPATTENDINGPLANDISCUSS_GEN_ALL_CORE
medicine team resident and attending Dr. Chavira
Dr. An cardiologist and Dr. Castro PGY1
Dr. Castro PGY1

## 2018-07-21 DIAGNOSIS — E83.39 OTHER DISORDERS OF PHOSPHORUS METABOLISM: ICD-10-CM

## 2018-07-21 LAB
ALBUMIN SERPL ELPH-MCNC: 2.9 G/DL — LOW (ref 3.5–5)
ALP SERPL-CCNC: 93 U/L — SIGNIFICANT CHANGE UP (ref 40–120)
ALT FLD-CCNC: 50 U/L DA — SIGNIFICANT CHANGE UP (ref 10–60)
ANION GAP SERPL CALC-SCNC: 7 MMOL/L — SIGNIFICANT CHANGE UP (ref 5–17)
AST SERPL-CCNC: 39 U/L — SIGNIFICANT CHANGE UP (ref 10–40)
BASOPHILS # BLD AUTO: 0.1 K/UL — SIGNIFICANT CHANGE UP (ref 0–0.2)
BASOPHILS NFR BLD AUTO: 1.2 % — SIGNIFICANT CHANGE UP (ref 0–2)
BILIRUB SERPL-MCNC: 1.3 MG/DL — HIGH (ref 0.2–1.2)
BUN SERPL-MCNC: 7 MG/DL — SIGNIFICANT CHANGE UP (ref 7–18)
CALCIUM SERPL-MCNC: 8.6 MG/DL — SIGNIFICANT CHANGE UP (ref 8.4–10.5)
CHLORIDE SERPL-SCNC: 107 MMOL/L — SIGNIFICANT CHANGE UP (ref 96–108)
CO2 SERPL-SCNC: 25 MMOL/L — SIGNIFICANT CHANGE UP (ref 22–31)
CREAT SERPL-MCNC: 0.77 MG/DL — SIGNIFICANT CHANGE UP (ref 0.5–1.3)
EOSINOPHIL # BLD AUTO: 0.3 K/UL — SIGNIFICANT CHANGE UP (ref 0–0.5)
EOSINOPHIL NFR BLD AUTO: 3.5 % — SIGNIFICANT CHANGE UP (ref 0–6)
GLUCOSE SERPL-MCNC: 84 MG/DL — SIGNIFICANT CHANGE UP (ref 70–99)
HCT VFR BLD CALC: 38.5 % — SIGNIFICANT CHANGE UP (ref 34.5–45)
HGB BLD-MCNC: 12.7 G/DL — SIGNIFICANT CHANGE UP (ref 11.5–15.5)
LYMPHOCYTES # BLD AUTO: 1.5 K/UL — SIGNIFICANT CHANGE UP (ref 1–3.3)
LYMPHOCYTES # BLD AUTO: 17.6 % — SIGNIFICANT CHANGE UP (ref 13–44)
MAGNESIUM SERPL-MCNC: 2.3 MG/DL — SIGNIFICANT CHANGE UP (ref 1.6–2.6)
MCHC RBC-ENTMCNC: 28.7 PG — SIGNIFICANT CHANGE UP (ref 27–34)
MCHC RBC-ENTMCNC: 33 GM/DL — SIGNIFICANT CHANGE UP (ref 32–36)
MCV RBC AUTO: 86.9 FL — SIGNIFICANT CHANGE UP (ref 80–100)
MONOCYTES # BLD AUTO: 0.6 K/UL — SIGNIFICANT CHANGE UP (ref 0–0.9)
MONOCYTES NFR BLD AUTO: 7.5 % — SIGNIFICANT CHANGE UP (ref 2–14)
NEUTROPHILS # BLD AUTO: 5.9 K/UL — SIGNIFICANT CHANGE UP (ref 1.8–7.4)
NEUTROPHILS NFR BLD AUTO: 70.1 % — SIGNIFICANT CHANGE UP (ref 43–77)
PHOSPHATE SERPL-MCNC: 2.4 MG/DL — LOW (ref 2.5–4.5)
PLATELET # BLD AUTO: 220 K/UL — SIGNIFICANT CHANGE UP (ref 150–400)
POTASSIUM SERPL-MCNC: 4.2 MMOL/L — SIGNIFICANT CHANGE UP (ref 3.5–5.3)
POTASSIUM SERPL-SCNC: 4.2 MMOL/L — SIGNIFICANT CHANGE UP (ref 3.5–5.3)
PROT SERPL-MCNC: 6.4 G/DL — SIGNIFICANT CHANGE UP (ref 6–8.3)
RBC # BLD: 4.43 M/UL — SIGNIFICANT CHANGE UP (ref 3.8–5.2)
RBC # FLD: 12.7 % — SIGNIFICANT CHANGE UP (ref 10.3–14.5)
SODIUM SERPL-SCNC: 139 MMOL/L — SIGNIFICANT CHANGE UP (ref 135–145)
WBC # BLD: 8.3 K/UL — SIGNIFICANT CHANGE UP (ref 3.8–10.5)
WBC # FLD AUTO: 8.3 K/UL — SIGNIFICANT CHANGE UP (ref 3.8–10.5)

## 2018-07-21 PROCEDURE — 99233 SBSQ HOSP IP/OBS HIGH 50: CPT

## 2018-07-21 RX ORDER — SODIUM,POTASSIUM PHOSPHATES 278-250MG
1 POWDER IN PACKET (EA) ORAL
Qty: 0 | Refills: 0 | Status: COMPLETED | OUTPATIENT
Start: 2018-07-21 | End: 2018-07-23

## 2018-07-21 RX ORDER — PANTOPRAZOLE SODIUM 20 MG/1
40 TABLET, DELAYED RELEASE ORAL
Qty: 0 | Refills: 0 | Status: DISCONTINUED | OUTPATIENT
Start: 2018-07-22 | End: 2018-07-25

## 2018-07-21 RX ADMIN — Medication 25 MILLIGRAM(S): at 21:18

## 2018-07-21 RX ADMIN — ENOXAPARIN SODIUM 40 MILLIGRAM(S): 100 INJECTION SUBCUTANEOUS at 13:03

## 2018-07-21 RX ADMIN — ATORVASTATIN CALCIUM 80 MILLIGRAM(S): 80 TABLET, FILM COATED ORAL at 21:18

## 2018-07-21 RX ADMIN — Medication 81 MILLIGRAM(S): at 13:02

## 2018-07-21 RX ADMIN — Medication 1 TABLET(S): at 21:18

## 2018-07-21 RX ADMIN — PANTOPRAZOLE SODIUM 40 MILLIGRAM(S): 20 TABLET, DELAYED RELEASE ORAL at 13:03

## 2018-07-21 RX ADMIN — Medication 1 TABLET(S): at 17:24

## 2018-07-21 NOTE — PROGRESS NOTE ADULT - SUBJECTIVE AND OBJECTIVE BOX
MEDICAL ATTENDING NOTE: Attending Medicine Covering Dr. Chavira    Patient is a 57y old  Female who presents with a chief complaint of weakness of right upper and le (17 Jul 2018 17:42)      INTERVAL HPI/OVERNIGHT EVENTS: no new complaints    MEDICATIONS  (STANDING):  aspirin  chewable 81 milliGRAM(s) Oral daily  atorvastatin 80 milliGRAM(s) Oral at bedtime  dextrose 5% + sodium chloride 0.9%. 1000 milliLiter(s) (70 mL/Hr) IV Continuous <Continuous>  enoxaparin Injectable 40 milliGRAM(s) SubCutaneous daily  pantoprazole  Injectable 40 milliGRAM(s) IV Push daily    MEDICATIONS  (PRN):  diphenhydrAMINE   Capsule 25 milliGRAM(s) Oral every 4 hours PRN Rash and/or Itching      __________________________________________________  REVIEW OF SYSTEMS:    CONSTITUTIONAL: No fever,   EYES: no acute visual disturbances  NECK: No pain or stiffness  RESPIRATORY: No cough; No shortness of breath  CARDIOVASCULAR: No chest pain, no palpitations  GASTROINTESTINAL: No pain. No nausea or vomiting; No diarrhea   NEUROLOGICAL: No headache or numbness, no tremors  MUSCULOSKELETAL: No joint pain, no muscle pain  GENITOURINARY: no dysuria, no frequency, no hesitancy  PSYCHIATRY: no depression , no anxiety  ALL OTHER  ROS negative        Vital Signs Last 24 Hrs  T(C): 36.9 (21 Jul 2018 12:03), Max: 37.3 (20 Jul 2018 23:30)  T(F): 98.4 (21 Jul 2018 12:03), Max: 99.1 (20 Jul 2018 23:30)  HR: 77 (21 Jul 2018 12:03) (70 - 81)  BP: 102/53 (21 Jul 2018 12:03) (100/54 - 113/50)  BP(mean): --  RR: 18 (21 Jul 2018 12:03) (16 - 18)  SpO2: 98% (21 Jul 2018 12:03) (91% - 99%)    ________________________________________________  PHYSICAL EXAM:  GENERAL: NAD  HEENT: Normocephalic;  conjunctivae and sclerae clear; moist mucous membranes;   NECK : supple  CHEST/LUNG: Clear to auscultation bilaterally with good air entry   HEART: S1 S2  regular; no murmurs, gallops or rubs  ABDOMEN: Soft, Nontender, Nondistended; Bowel sounds present  EXTREMITIES: no cyanosis; no edema; no calf tenderness  SKIN: warm and dry; no rash  NERVOUS SYSTEM:  Awake and alert; Oriented  to place, person and time ; no new deficits    _________________________________________________  LABS:                        12.7   8.3   )-----------( 220      ( 21 Jul 2018 07:27 )             38.5     07-21    139  |  107  |  7   ----------------------------<  84  4.2   |  25  |  0.77    Ca    8.6      21 Jul 2018 07:27  Phos  2.4     07-21  Mg     2.3     07-21    TPro  6.4  /  Alb  2.9<L>  /  TBili  1.3<H>  /  DBili  x   /  AST  39  /  ALT  50  /  AlkPhos  93  07-21        CAPILLARY BLOOD GLUCOSE            RADIOLOGY & ADDITIONAL TESTS:    Imaging Personally Reviewed:  YES/NO    Consultant(s) Notes Reviewed:   YES/ No    Care Discussed with Consultants :     Plan of care was discussed with patient and /or primary care giver; all questions and concerns were addressed and care was aligned with patient's wishes. MEDICAL ATTENDING NOTE: Attending Medicine Covering Dr. Chavira    Patient is a 57y old  Female who presents with a chief complaint of weakness of right upper and le (17 Jul 2018 17:42)      INTERVAL HPI/OVERNIGHT EVENTS: no new complaints    MEDICATIONS  (STANDING):  aspirin  chewable 81 milliGRAM(s) Oral daily  atorvastatin 80 milliGRAM(s) Oral at bedtime  dextrose 5% + sodium chloride 0.9%. 1000 milliLiter(s) (70 mL/Hr) IV Continuous <Continuous>  enoxaparin Injectable 40 milliGRAM(s) SubCutaneous daily  pantoprazole  Injectable 40 milliGRAM(s) IV Push daily    MEDICATIONS  (PRN):  diphenhydrAMINE   Capsule 25 milliGRAM(s) Oral every 4 hours PRN Rash and/or Itching      __________________________________________________  REVIEW OF SYSTEMS:    CONSTITUTIONAL: No fever,   EYES: no acute visual disturbances  NECK: No pain or stiffness  RESPIRATORY: No cough; No shortness of breath  CARDIOVASCULAR: No chest pain, no palpitations  GASTROINTESTINAL: No pain. No nausea or vomiting; No diarrhea   NEUROLOGICAL: No headache or numbness, no tremors  MUSCULOSKELETAL: No joint pain, no muscle pain  GENITOURINARY: no dysuria, no frequency, no hesitancy  PSYCHIATRY: no depression , no anxiety  ALL OTHER  ROS negative        Vital Signs Last 24 Hrs  T(C): 36.9 (21 Jul 2018 12:03), Max: 37.3 (20 Jul 2018 23:30)  T(F): 98.4 (21 Jul 2018 12:03), Max: 99.1 (20 Jul 2018 23:30)  HR: 77 (21 Jul 2018 12:03) (70 - 81)  BP: 102/53 (21 Jul 2018 12:03) (100/54 - 113/50)  BP(mean): --  RR: 18 (21 Jul 2018 12:03) (16 - 18)  SpO2: 98% (21 Jul 2018 12:03) (91% - 99%)    ________________________________________________  PHYSICAL EXAM:  GENERAL: slurred speech but improving  HEENT: Normocephalic;  conjunctivae and sclerae clear; moist mucous membranes;   NECK : supple  CHEST/LUNG: Clear to auscultation bilaterally with good air entry   HEART: S1 S2  regular; no murmurs, gallops or rubs  ABDOMEN: Soft, Nontender, Nondistended; Bowel sounds present  EXTREMITIES: no cyanosis; no edema; no calf tenderness;   SKIN: warm and dry; no rash  NERVOUS SYSTEM:  Awake and alert; Oriented  to place, person and time ; Right sided weakness Power 4/5.     _________________________________________________  LABS:                        12.7   8.3   )-----------( 220      ( 21 Jul 2018 07:27 )             38.5     07-21    139  |  107  |  7   ----------------------------<  84  4.2   |  25  |  0.77    Ca    8.6      21 Jul 2018 07:27  Phos  2.4     07-21  Mg     2.3     07-21    TPro  6.4  /  Alb  2.9<L>  /  TBili  1.3<H>  /  DBili  x   /  AST  39  /  ALT  50  /  AlkPhos  93  07-21    RADIOLOGY & ADDITIONAL TESTS: No new    Consultant(s) Notes Reviewed:   YES    Plan of care was discussed with patient and /or primary care giver; all questions and concerns were addressed and care was aligned with patient's wishes.

## 2018-07-22 DIAGNOSIS — F41.9 ANXIETY DISORDER, UNSPECIFIED: ICD-10-CM

## 2018-07-22 LAB
ALBUMIN SERPL ELPH-MCNC: 3 G/DL — LOW (ref 3.5–5)
ALP SERPL-CCNC: 105 U/L — SIGNIFICANT CHANGE UP (ref 40–120)
ALT FLD-CCNC: 52 U/L DA — SIGNIFICANT CHANGE UP (ref 10–60)
ANA PAT FLD IF-IMP: ABNORMAL
ANA TITR SER: ABNORMAL
ANION GAP SERPL CALC-SCNC: 8 MMOL/L — SIGNIFICANT CHANGE UP (ref 5–17)
AST SERPL-CCNC: 38 U/L — SIGNIFICANT CHANGE UP (ref 10–40)
BASOPHILS # BLD AUTO: 0.1 K/UL — SIGNIFICANT CHANGE UP (ref 0–0.2)
BASOPHILS NFR BLD AUTO: 1.2 % — SIGNIFICANT CHANGE UP (ref 0–2)
BILIRUB SERPL-MCNC: 1.5 MG/DL — HIGH (ref 0.2–1.2)
BUN SERPL-MCNC: 11 MG/DL — SIGNIFICANT CHANGE UP (ref 7–18)
CALCIUM SERPL-MCNC: 8.9 MG/DL — SIGNIFICANT CHANGE UP (ref 8.4–10.5)
CHLORIDE SERPL-SCNC: 106 MMOL/L — SIGNIFICANT CHANGE UP (ref 96–108)
CO2 SERPL-SCNC: 25 MMOL/L — SIGNIFICANT CHANGE UP (ref 22–31)
CREAT SERPL-MCNC: 0.78 MG/DL — SIGNIFICANT CHANGE UP (ref 0.5–1.3)
EOSINOPHIL # BLD AUTO: 0.3 K/UL — SIGNIFICANT CHANGE UP (ref 0–0.5)
EOSINOPHIL NFR BLD AUTO: 3.9 % — SIGNIFICANT CHANGE UP (ref 0–6)
GLUCOSE SERPL-MCNC: 78 MG/DL — SIGNIFICANT CHANGE UP (ref 70–99)
HCT VFR BLD CALC: 42.2 % — SIGNIFICANT CHANGE UP (ref 34.5–45)
HGB BLD-MCNC: 13.7 G/DL — SIGNIFICANT CHANGE UP (ref 11.5–15.5)
LYMPHOCYTES # BLD AUTO: 1.1 K/UL — SIGNIFICANT CHANGE UP (ref 1–3.3)
LYMPHOCYTES # BLD AUTO: 13.5 % — SIGNIFICANT CHANGE UP (ref 13–44)
MAGNESIUM SERPL-MCNC: 2.3 MG/DL — SIGNIFICANT CHANGE UP (ref 1.6–2.6)
MCHC RBC-ENTMCNC: 28.1 PG — SIGNIFICANT CHANGE UP (ref 27–34)
MCHC RBC-ENTMCNC: 32.3 GM/DL — SIGNIFICANT CHANGE UP (ref 32–36)
MCV RBC AUTO: 87 FL — SIGNIFICANT CHANGE UP (ref 80–100)
MONOCYTES # BLD AUTO: 0.6 K/UL — SIGNIFICANT CHANGE UP (ref 0–0.9)
MONOCYTES NFR BLD AUTO: 8 % — SIGNIFICANT CHANGE UP (ref 2–14)
NEUTROPHILS # BLD AUTO: 5.9 K/UL — SIGNIFICANT CHANGE UP (ref 1.8–7.4)
NEUTROPHILS NFR BLD AUTO: 73.4 % — SIGNIFICANT CHANGE UP (ref 43–77)
PHOSPHATE SERPL-MCNC: 3 MG/DL — SIGNIFICANT CHANGE UP (ref 2.5–4.5)
PLATELET # BLD AUTO: 218 K/UL — SIGNIFICANT CHANGE UP (ref 150–400)
POTASSIUM SERPL-MCNC: 3.9 MMOL/L — SIGNIFICANT CHANGE UP (ref 3.5–5.3)
POTASSIUM SERPL-SCNC: 3.9 MMOL/L — SIGNIFICANT CHANGE UP (ref 3.5–5.3)
PROT SERPL-MCNC: 7 G/DL — SIGNIFICANT CHANGE UP (ref 6–8.3)
RBC # BLD: 4.86 M/UL — SIGNIFICANT CHANGE UP (ref 3.8–5.2)
RBC # FLD: 12.6 % — SIGNIFICANT CHANGE UP (ref 10.3–14.5)
SODIUM SERPL-SCNC: 139 MMOL/L — SIGNIFICANT CHANGE UP (ref 135–145)
WBC # BLD: 8 K/UL — SIGNIFICANT CHANGE UP (ref 3.8–10.5)
WBC # FLD AUTO: 8 K/UL — SIGNIFICANT CHANGE UP (ref 3.8–10.5)

## 2018-07-22 PROCEDURE — 99233 SBSQ HOSP IP/OBS HIGH 50: CPT | Mod: GC

## 2018-07-22 RX ORDER — CITALOPRAM 10 MG/1
20 TABLET, FILM COATED ORAL AT BEDTIME
Qty: 0 | Refills: 0 | Status: DISCONTINUED | OUTPATIENT
Start: 2018-07-22 | End: 2018-07-25

## 2018-07-22 RX ADMIN — Medication 1 TABLET(S): at 12:14

## 2018-07-22 RX ADMIN — Medication 81 MILLIGRAM(S): at 11:05

## 2018-07-22 RX ADMIN — CITALOPRAM 20 MILLIGRAM(S): 10 TABLET, FILM COATED ORAL at 21:38

## 2018-07-22 RX ADMIN — Medication 1 TABLET(S): at 08:22

## 2018-07-22 RX ADMIN — PANTOPRAZOLE SODIUM 40 MILLIGRAM(S): 20 TABLET, DELAYED RELEASE ORAL at 05:29

## 2018-07-22 RX ADMIN — ATORVASTATIN CALCIUM 80 MILLIGRAM(S): 80 TABLET, FILM COATED ORAL at 21:38

## 2018-07-22 RX ADMIN — Medication 1 TABLET(S): at 21:38

## 2018-07-22 RX ADMIN — Medication 0.5 MILLIGRAM(S): at 11:05

## 2018-07-22 RX ADMIN — ENOXAPARIN SODIUM 40 MILLIGRAM(S): 100 INJECTION SUBCUTANEOUS at 11:05

## 2018-07-22 RX ADMIN — Medication 25 MILLIGRAM(S): at 16:42

## 2018-07-22 RX ADMIN — Medication 1 TABLET(S): at 16:42

## 2018-07-22 NOTE — PROGRESS NOTE ADULT - ASSESSMENT
Ms. Naila Banegas is a 57 year old female with PMH of breast CA s/p lumpectomy who came from home with complaint of right upper and lower extremity weakness, as well as symptoms of slurred speech and facial droop. Patient initially went to sleep after onset of symptoms hoping they would resolve, but awoke to find they had worsened. Patient was admitted for high suspicion of CVA. CT head showed left thalamic acute/subacute infarct. MRI shows acute infarct of thalamus extending to midbrain and multiple chronic infarcts. JOSHUA negative. Plan for outpatient loop recorder. Patient agrees to acute rehab.    Patient is clinically stable and awaiting authorization from [facility] for transfer to acute rehab.

## 2018-07-22 NOTE — PROGRESS NOTE ADULT - SUBJECTIVE AND OBJECTIVE BOX
PGY1 Note discussed with supervising resident and primary attending.    Patient is a 57y old  Female who presents with a chief complaint of weakness of right upper and le (17 Jul 2018 17:42)      INTERVAL HPI/OVERNIGHT EVENTS:    Ms. Banegas is seen at the beside. She reports feeling ok. She asks to be restarted on her home medication Celexa and ativan, which she takes as needed. She has no new complaints at this time and is awaiting placement at [facility] rehab, pending authorization.    MEDICATIONS  (STANDING):  aspirin  chewable 81 milliGRAM(s) Oral daily  atorvastatin 80 milliGRAM(s) Oral at bedtime  citalopram 20 milliGRAM(s) Oral at bedtime  dextrose 5% + sodium chloride 0.9%. 1000 milliLiter(s) (70 mL/Hr) IV Continuous <Continuous>  enoxaparin Injectable 40 milliGRAM(s) SubCutaneous daily  pantoprazole    Tablet 40 milliGRAM(s) Oral before breakfast  potassium acid phosphate/sodium acid phosphate tablet (K-PHOS No. 2) 1 Tablet(s) Oral four times a day with meals    MEDICATIONS  (PRN):  diphenhydrAMINE   Capsule 25 milliGRAM(s) Oral every 4 hours PRN Rash and/or Itching  LORazepam     Tablet 0.5 milliGRAM(s) Oral every 6 hours PRN Anxiety      Allergies    penicillin (Unknown)    Intolerances    REVIEW OF SYSTEMS:  CONSTITUTIONAL: No fever, weight loss, or fatigue  RESPIRATORY: No cough, wheezing, chills or hemoptysis; No shortness of breath  CARDIOVASCULAR: No chest pain, palpitations, dizziness, or leg swelling  GASTROINTESTINAL: No abdominal or epigastric pain. No nausea, vomiting, or hematemesis; No diarrhea or constipation. No melena or hematochezia.  NEUROLOGICAL: No headaches, memory loss, loss of strength, numbness, or tremors  SKIN: No itching, burning, rashes, or lesions     Vital Signs Last 24 Hrs  T(C): 36.7 (22 Jul 2018 08:04), Max: 37.3 (21 Jul 2018 20:02)  T(F): 98 (22 Jul 2018 08:04), Max: 99.1 (21 Jul 2018 20:02)  HR: 74 (22 Jul 2018 08:04) (57 - 79)  BP: 92/51 (22 Jul 2018 08:04) (92/51 - 110/68)  BP(mean): --  RR: 16 (22 Jul 2018 08:04) (16 - 18)  SpO2: 94% (22 Jul 2018 08:04) (92% - 98%)    PHYSICAL EXAM:  GENERAL: patient appears uncomfortable in bed  HEAD:  prominent facial droop  EYES: deviation of left eye  NECK: Supple, No JVD, Normal thyroid  CHEST/LUNG: Clear to percussion bilaterally; No rales, rhonchi, wheezing, or rubs  HEART: Regular rate and rhythm; No murmurs, rubs, or gallops  ABDOMEN: Soft, Nontender, Nondistended; Bowel sounds present  NERVOUS SYSTEM:  Alert & Oriented X3, Good concentration; patient feels weak on right side of body   EXTREMITIES:  2-3/5 motor strength of right upper and lower extremities. 5/5 on left extremities               LABS                          13.7   8.0   )-----------( 218      ( 22 Jul 2018 08:17 )             42.2     07-22    139  |  106  |  11  ----------------------------<  78  3.9   |  25  |  0.78    Ca    8.9      22 Jul 2018 08:17  Phos  3.0     07-22  Mg     2.3     07-22    TPro  7.0  /  Alb  3.0<L>  /  TBili  1.5<H>  /  DBili  x   /  AST  38  /  ALT  52  /  AlkPhos  105  07-22      MR:      IMPRESSION:  Incomplete exam.    Small acute infarct left thalamus.    No acute intracranial hemorrhage.    JOSHUA:    CONCLUSIONS:  1. No left atrial or left atrial appendage thrombus. Left  atrial appendage velocity is normal at  0.54 m/s.    *** Compared with echocardiogram of 7/18/2018, a limited  JOSHUA was performed and additional information provided.

## 2018-07-23 DIAGNOSIS — R21 RASH AND OTHER NONSPECIFIC SKIN ERUPTION: ICD-10-CM

## 2018-07-23 LAB
ALBUMIN SERPL ELPH-MCNC: 3.1 G/DL — LOW (ref 3.5–5)
ALP SERPL-CCNC: 112 U/L — SIGNIFICANT CHANGE UP (ref 40–120)
ALT FLD-CCNC: 65 U/L DA — HIGH (ref 10–60)
ANION GAP SERPL CALC-SCNC: 8 MMOL/L — SIGNIFICANT CHANGE UP (ref 5–17)
AST SERPL-CCNC: 47 U/L — HIGH (ref 10–40)
B2 GLYCOPROT1 AB SER QL: NEGATIVE — SIGNIFICANT CHANGE UP
BASOPHILS # BLD AUTO: 0.1 K/UL — SIGNIFICANT CHANGE UP (ref 0–0.2)
BASOPHILS NFR BLD AUTO: 1.3 % — SIGNIFICANT CHANGE UP (ref 0–2)
BILIRUB SERPL-MCNC: 1.4 MG/DL — HIGH (ref 0.2–1.2)
BUN SERPL-MCNC: 13 MG/DL — SIGNIFICANT CHANGE UP (ref 7–18)
CALCIUM SERPL-MCNC: 9.1 MG/DL — SIGNIFICANT CHANGE UP (ref 8.4–10.5)
CARDIOLIPIN AB SER-ACNC: NEGATIVE — SIGNIFICANT CHANGE UP
CHLORIDE SERPL-SCNC: 106 MMOL/L — SIGNIFICANT CHANGE UP (ref 96–108)
CO2 SERPL-SCNC: 23 MMOL/L — SIGNIFICANT CHANGE UP (ref 22–31)
CREAT SERPL-MCNC: 0.83 MG/DL — SIGNIFICANT CHANGE UP (ref 0.5–1.3)
EOSINOPHIL # BLD AUTO: 0.3 K/UL — SIGNIFICANT CHANGE UP (ref 0–0.5)
EOSINOPHIL NFR BLD AUTO: 3.8 % — SIGNIFICANT CHANGE UP (ref 0–6)
GLUCOSE SERPL-MCNC: 89 MG/DL — SIGNIFICANT CHANGE UP (ref 70–99)
HCT VFR BLD CALC: 42.2 % — SIGNIFICANT CHANGE UP (ref 34.5–45)
HGB BLD-MCNC: 13.7 G/DL — SIGNIFICANT CHANGE UP (ref 11.5–15.5)
LYMPHOCYTES # BLD AUTO: 1.5 K/UL — SIGNIFICANT CHANGE UP (ref 1–3.3)
LYMPHOCYTES # BLD AUTO: 18 % — SIGNIFICANT CHANGE UP (ref 13–44)
MAGNESIUM SERPL-MCNC: 2.4 MG/DL — SIGNIFICANT CHANGE UP (ref 1.6–2.6)
MCHC RBC-ENTMCNC: 28.1 PG — SIGNIFICANT CHANGE UP (ref 27–34)
MCHC RBC-ENTMCNC: 32.5 GM/DL — SIGNIFICANT CHANGE UP (ref 32–36)
MCV RBC AUTO: 86.4 FL — SIGNIFICANT CHANGE UP (ref 80–100)
MONOCYTES # BLD AUTO: 0.7 K/UL — SIGNIFICANT CHANGE UP (ref 0–0.9)
MONOCYTES NFR BLD AUTO: 8.8 % — SIGNIFICANT CHANGE UP (ref 2–14)
NEUTROPHILS # BLD AUTO: 5.6 K/UL — SIGNIFICANT CHANGE UP (ref 1.8–7.4)
NEUTROPHILS NFR BLD AUTO: 68.2 % — SIGNIFICANT CHANGE UP (ref 43–77)
PHOSPHATE SERPL-MCNC: 3.5 MG/DL — SIGNIFICANT CHANGE UP (ref 2.5–4.5)
PLATELET # BLD AUTO: 239 K/UL — SIGNIFICANT CHANGE UP (ref 150–400)
POTASSIUM SERPL-MCNC: 3.9 MMOL/L — SIGNIFICANT CHANGE UP (ref 3.5–5.3)
POTASSIUM SERPL-SCNC: 3.9 MMOL/L — SIGNIFICANT CHANGE UP (ref 3.5–5.3)
PROT SERPL-MCNC: 7.1 G/DL — SIGNIFICANT CHANGE UP (ref 6–8.3)
RBC # BLD: 4.88 M/UL — SIGNIFICANT CHANGE UP (ref 3.8–5.2)
RBC # FLD: 12.7 % — SIGNIFICANT CHANGE UP (ref 10.3–14.5)
SODIUM SERPL-SCNC: 137 MMOL/L — SIGNIFICANT CHANGE UP (ref 135–145)
WBC # BLD: 8.2 K/UL — SIGNIFICANT CHANGE UP (ref 3.8–10.5)
WBC # FLD AUTO: 8.2 K/UL — SIGNIFICANT CHANGE UP (ref 3.8–10.5)

## 2018-07-23 PROCEDURE — 99233 SBSQ HOSP IP/OBS HIGH 50: CPT | Mod: GC

## 2018-07-23 RX ORDER — HYDROCORTISONE 1 %
1 OINTMENT (GRAM) TOPICAL DAILY
Qty: 0 | Refills: 0 | Status: DISCONTINUED | OUTPATIENT
Start: 2018-07-23 | End: 2018-07-25

## 2018-07-23 RX ADMIN — PANTOPRAZOLE SODIUM 40 MILLIGRAM(S): 20 TABLET, DELAYED RELEASE ORAL at 05:22

## 2018-07-23 RX ADMIN — CITALOPRAM 20 MILLIGRAM(S): 10 TABLET, FILM COATED ORAL at 21:14

## 2018-07-23 RX ADMIN — Medication 81 MILLIGRAM(S): at 12:40

## 2018-07-23 RX ADMIN — Medication 1 TABLET(S): at 08:46

## 2018-07-23 RX ADMIN — ATORVASTATIN CALCIUM 80 MILLIGRAM(S): 80 TABLET, FILM COATED ORAL at 21:14

## 2018-07-23 RX ADMIN — Medication 1 TABLET(S): at 12:40

## 2018-07-23 RX ADMIN — Medication 1 APPLICATION(S): at 12:40

## 2018-07-23 RX ADMIN — ENOXAPARIN SODIUM 40 MILLIGRAM(S): 100 INJECTION SUBCUTANEOUS at 12:39

## 2018-07-23 NOTE — PROGRESS NOTE ADULT - SUBJECTIVE AND OBJECTIVE BOX
PGY1 Note discussed with supervising resident and primary attending.    Patient is a 57y old  Female who presents with a chief complaint of weakness of right upper and le (17 Jul 2018 17:42)      INTERVAL HPI/OVERNIGHT EVENTS:    Ms. Banegas is seen at the beside. She has no new complaints at this time.  expresses frustration over case management, as he was originally told his wife would be able to go to [place] on 7/20/18. Case management clarified today that authorization is still pending.    MEDICATIONS  (STANDING):  aspirin  chewable 81 milliGRAM(s) Oral daily  atorvastatin 80 milliGRAM(s) Oral at bedtime  citalopram 20 milliGRAM(s) Oral at bedtime  enoxaparin Injectable 40 milliGRAM(s) SubCutaneous daily  pantoprazole    Tablet 40 milliGRAM(s) Oral before breakfast  potassium acid phosphate/sodium acid phosphate tablet (K-PHOS No. 2) 1 Tablet(s) Oral four times a day with meals    MEDICATIONS  (PRN):  diphenhydrAMINE   Capsule 25 milliGRAM(s) Oral every 4 hours PRN Rash and/or Itching  LORazepam     Tablet 0.5 milliGRAM(s) Oral every 6 hours PRN Anxiety    Allergies    penicillin (Unknown)    Intolerances    REVIEW OF SYSTEMS:  CONSTITUTIONAL: No fever, weight loss, or fatigue  RESPIRATORY: No cough, wheezing, chills or hemoptysis; No shortness of breath  CARDIOVASCULAR: No chest pain, palpitations, dizziness, or leg swelling  GASTROINTESTINAL: No abdominal or epigastric pain. No nausea, vomiting, or hematemesis; No diarrhea or constipation. No melena or hematochezia.  NEUROLOGICAL: No headaches, memory loss, loss of strength, numbness, or tremors    Vital Signs Last 24 Hrs  T(C): 36.7 (22 Jul 2018 08:04), Max: 37.3 (21 Jul 2018 20:02)  T(F): 98 (22 Jul 2018 08:04), Max: 99.1 (21 Jul 2018 20:02)  HR: 74 (22 Jul 2018 08:04) (57 - 79)  BP: 92/51 (22 Jul 2018 08:04) (92/51 - 110/68)  BP(mean): --  RR: 16 (22 Jul 2018 08:04) (16 - 18)  SpO2: 94% (22 Jul 2018 08:04) (92% - 98%)    PHYSICAL EXAM:  GENERAL: patient appears uncomfortable in bed  HEAD:  prominent facial droop  EYES: deviation of left eye  NECK: Supple, No JVD, Normal thyroid  CHEST/LUNG: Clear to percussion bilaterally; No rales, rhonchi, wheezing, or rubs  HEART: Regular rate and rhythm; No murmurs, rubs, or gallops  ABDOMEN: Soft, Nontender, Nondistended; Bowel sounds present  NERVOUS SYSTEM:  Alert & Oriented X3, Good concentration; patient feels weak on right side of body   EXTREMITIES:  2-3/5 motor strength of right upper and lower extremities. 5/5 on left extremities  SKIN: b/l psoriatic rashes on ankles - not painful or pruritic             LABS                          13.7   8.0   )-----------( 218      ( 22 Jul 2018 08:17 )             42.2     07-22    139  |  106  |  11  ----------------------------<  78  3.9   |  25  |  0.78    Ca    8.9      22 Jul 2018 08:17  Phos  3.0     07-22  Mg     2.3     07-22    TPro  7.0  /  Alb  3.0<L>  /  TBili  1.5<H>  /  DBili  x   /  AST  38  /  ALT  52  /  AlkPhos  105  07-22      MR:      IMPRESSION:  Incomplete exam.    Small acute infarct left thalamus.    No acute intracranial hemorrhage.    JOSHUA:    CONCLUSIONS:  1. No left atrial or left atrial appendage thrombus. Left  atrial appendage velocity is normal at  0.54 m/s.    *** Compared with echocardiogram of 7/18/2018, a limited  JOSHUA was performed and additional information provided.

## 2018-07-24 LAB
DNA PLOIDY SPEC FC-IMP: SIGNIFICANT CHANGE UP
MTHFR GENE INTERPRETATION: SIGNIFICANT CHANGE UP
PTR INTERPRETATION: SIGNIFICANT CHANGE UP

## 2018-07-24 PROCEDURE — 99233 SBSQ HOSP IP/OBS HIGH 50: CPT | Mod: GC

## 2018-07-24 RX ORDER — ATORVASTATIN CALCIUM 80 MG/1
1 TABLET, FILM COATED ORAL
Qty: 30 | Refills: 0 | OUTPATIENT
Start: 2018-07-24 | End: 2018-08-22

## 2018-07-24 RX ORDER — PANTOPRAZOLE SODIUM 20 MG/1
1 TABLET, DELAYED RELEASE ORAL
Qty: 30 | Refills: 0 | OUTPATIENT
Start: 2018-07-24 | End: 2018-08-22

## 2018-07-24 RX ORDER — ASPIRIN/CALCIUM CARB/MAGNESIUM 324 MG
1 TABLET ORAL
Qty: 30 | Refills: 0 | OUTPATIENT
Start: 2018-07-24 | End: 2018-08-22

## 2018-07-24 RX ORDER — CITALOPRAM 10 MG/1
1 TABLET, FILM COATED ORAL
Qty: 30 | Refills: 0 | OUTPATIENT
Start: 2018-07-24 | End: 2018-08-22

## 2018-07-24 RX ORDER — HYDROCORTISONE 1 %
1 OINTMENT (GRAM) TOPICAL
Qty: 1 | Refills: 0 | OUTPATIENT
Start: 2018-07-24 | End: 2018-07-30

## 2018-07-24 RX ORDER — FUROSEMIDE 40 MG
20 TABLET ORAL ONCE
Qty: 0 | Refills: 0 | Status: COMPLETED | OUTPATIENT
Start: 2018-07-24 | End: 2018-07-24

## 2018-07-24 RX ORDER — FUROSEMIDE 40 MG
20 TABLET ORAL DAILY
Qty: 0 | Refills: 0 | Status: DISCONTINUED | OUTPATIENT
Start: 2018-07-24 | End: 2018-07-24

## 2018-07-24 RX ADMIN — Medication 1 APPLICATION(S): at 12:42

## 2018-07-24 RX ADMIN — ENOXAPARIN SODIUM 40 MILLIGRAM(S): 100 INJECTION SUBCUTANEOUS at 12:42

## 2018-07-24 RX ADMIN — ATORVASTATIN CALCIUM 80 MILLIGRAM(S): 80 TABLET, FILM COATED ORAL at 21:02

## 2018-07-24 RX ADMIN — CITALOPRAM 20 MILLIGRAM(S): 10 TABLET, FILM COATED ORAL at 21:02

## 2018-07-24 RX ADMIN — Medication 81 MILLIGRAM(S): at 12:42

## 2018-07-24 RX ADMIN — PANTOPRAZOLE SODIUM 40 MILLIGRAM(S): 20 TABLET, DELAYED RELEASE ORAL at 05:16

## 2018-07-24 RX ADMIN — Medication 0.5 MILLIGRAM(S): at 18:13

## 2018-07-24 NOTE — DIETITIAN INITIAL EVALUATION ADULT. - NS FNS WEIGHT USED FOR CALC
Ht=5' 5"   Adjusted CRC=275.5 lb    Admission xs=495 lb    BMI=36.6     Wts since admission fluctuated/adjusted

## 2018-07-24 NOTE — DIETITIAN INITIAL EVALUATION ADULT. - OTHER INFO
nutrition assessment for length of stay; lives home PTA: skin intact; s/p swallow re-evaluation with mechanical soft food, thin liquid recommended 7/19/18; able to tolerate mechanical soft food slowly, intake 100% if food likes, but varied depending on food served, complaints of food no taste without salt, willing to try Sodium-free seasoning with meals for extra flavor; unsure recent wt changes; pending approval from insurance for acute rehab

## 2018-07-24 NOTE — PROGRESS NOTE ADULT - ASSESSMENT
Ms. Naila Banegas is a 57 year old female with PMH of breast CA s/p lumpectomy who came from home with complaint of right upper and lower extremity weakness, as well as symptoms of slurred speech and facial droop. Patient initially went to sleep after onset of symptoms hoping they would resolve, but awoke to find they had worsened. Patient was admitted for high suspicion of CVA. CT head showed left thalamic acute/subacute infarct. MRI shows acute infarct of thalamus extending to midbrain and multiple chronic infarcts. JOSHUA negative. Plan for outpatient loop recorder. Patient agrees to acute rehab.    Patient is clinically stable and awaiting insurance authorization for transfer.

## 2018-07-24 NOTE — DIETITIAN INITIAL EVALUATION ADULT. - NS AS NUTRI INTERV FEED ASSISTANCE
Provide food choices within diet Rx as available/updated; Provide Na-free seasoning with meals for extra flavor/Meal set -up

## 2018-07-24 NOTE — DIETITIAN INITIAL EVALUATION ADULT. - FACTORS AFF FOOD INTAKE
difficulty chewing/difficulty with food procurement/preparation/[employer]/ethnic/cultural/personal food preferences

## 2018-07-24 NOTE — PROGRESS NOTE ADULT - SUBJECTIVE AND OBJECTIVE BOX
PGY1 Note discussed with supervising resident and primary attending.    Patient is a 57y old  Female who presents with a chief complaint of weakness of right upper and le (17 Jul 2018 17:42)      INTERVAL HPI/OVERNIGHT EVENTS:    Ms. Banegas is seen at the beside. She has no complaints at this time and reports feeling ok. Her  says that [facility] told them they were accepted. They are now awaiting insurance approval, as per .    MEDICATIONS  (STANDING):  aspirin  chewable 81 milliGRAM(s) Oral daily  atorvastatin 80 milliGRAM(s) Oral at bedtime  citalopram 20 milliGRAM(s) Oral at bedtime  enoxaparin Injectable 40 milliGRAM(s) SubCutaneous daily  hydrocortisone 1% Cream 1 Application(s) Topical daily  pantoprazole    Tablet 40 milliGRAM(s) Oral before breakfast    MEDICATIONS  (PRN):  diphenhydrAMINE   Capsule 25 milliGRAM(s) Oral every 4 hours PRN Rash and/or Itching  LORazepam     Tablet 0.5 milliGRAM(s) Oral every 6 hours PRN Anxiety    Allergies    penicillin (Unknown)    Intolerances    REVIEW OF SYSTEMS:  CONSTITUTIONAL: No fever, weight loss, or fatigue  RESPIRATORY: No cough, wheezing, chills or hemoptysis; No shortness of breath  CARDIOVASCULAR: No chest pain, palpitations, dizziness, or leg swelling  GASTROINTESTINAL: No abdominal or epigastric pain. No nausea, vomiting, or hematemesis; No diarrhea or constipation. No melena or hematochezia.  NEUROLOGICAL: No headaches, memory loss, loss of strength, numbness, or tremors    Vital Signs Last 24 Hrs  T(C): 37.1 (24 Jul 2018 11:20), Max: 37.1 (23 Jul 2018 15:43)  T(F): 98.7 (24 Jul 2018 11:20), Max: 98.7 (23 Jul 2018 15:43)  HR: 78 (24 Jul 2018 11:20) (73 - 81)  BP: 100/62 (24 Jul 2018 11:20) (95/52 - 110/68)  BP(mean): --  RR: 18 (24 Jul 2018 11:20) (16 - 18)  SpO2: 95% (24 Jul 2018 11:20) (91% - 98%)    PHYSICAL EXAM:  GENERAL: patient appears uncomfortable in bed  HEAD:  prominent facial droop  EYES: deviation of left eye  NECK: Supple, No JVD, Normal thyroid  CHEST/LUNG: Clear to percussion bilaterally; No rales, rhonchi, wheezing, or rubs  HEART: Regular rate and rhythm; No murmurs, rubs, or gallops  ABDOMEN: Soft, Nontender, Nondistended; Bowel sounds present  NERVOUS SYSTEM:  Alert & Oriented X3, Good concentration; patient feels weak on right side of body   EXTREMITIES:  2-3/5 motor strength of right upper and lower extremities. 5/5 on left extremities  SKIN: b/l psoriatic rashes on ankles - not painful or pruritic             LABS                          13.7   8.0   )-----------( 218      ( 22 Jul 2018 08:17 )             42.2     07-22    139  |  106  |  11  ----------------------------<  78  3.9   |  25  |  0.78    Ca    8.9      22 Jul 2018 08:17  Phos  3.0     07-22  Mg     2.3     07-22    TPro  7.0  /  Alb  3.0<L>  /  TBili  1.5<H>  /  DBili  x   /  AST  38  /  ALT  52  /  AlkPhos  105  07-22      MR:      IMPRESSION:  Incomplete exam.    Small acute infarct left thalamus.    No acute intracranial hemorrhage.    JOSHUA:    CONCLUSIONS:  1. No left atrial or left atrial appendage thrombus. Left  atrial appendage velocity is normal at  0.54 m/s.    *** Compared with echocardiogram of 7/18/2018, a limited  JOSHUA was performed and additional information provided.

## 2018-07-25 ENCOUNTER — INPATIENT (INPATIENT)
Facility: HOSPITAL | Age: 58
LOS: 13 days | Discharge: HOME | DRG: 949 | End: 2018-08-08
Attending: STUDENT IN AN ORGANIZED HEALTH CARE EDUCATION/TRAINING PROGRAM | Admitting: STUDENT IN AN ORGANIZED HEALTH CARE EDUCATION/TRAINING PROGRAM
Payer: COMMERCIAL

## 2018-07-25 VITALS
HEIGHT: 61 IN | HEART RATE: 79 BPM | SYSTOLIC BLOOD PRESSURE: 115 MMHG | TEMPERATURE: 98 F | WEIGHT: 206.35 LBS | DIASTOLIC BLOOD PRESSURE: 75 MMHG | RESPIRATION RATE: 14 BRPM

## 2018-07-25 VITALS
SYSTOLIC BLOOD PRESSURE: 109 MMHG | OXYGEN SATURATION: 97 % | DIASTOLIC BLOOD PRESSURE: 78 MMHG | RESPIRATION RATE: 18 BRPM | TEMPERATURE: 98 F | HEART RATE: 83 BPM

## 2018-07-25 DIAGNOSIS — I63.9 CEREBRAL INFARCTION, UNSPECIFIED: ICD-10-CM

## 2018-07-25 PROBLEM — C50.919 MALIGNANT NEOPLASM OF UNSPECIFIED SITE OF UNSPECIFIED FEMALE BREAST: Chronic | Status: ACTIVE | Noted: 2018-07-17

## 2018-07-25 PROCEDURE — 85730 THROMBOPLASTIN TIME PARTIAL: CPT

## 2018-07-25 PROCEDURE — 84100 ASSAY OF PHOSPHORUS: CPT

## 2018-07-25 PROCEDURE — 84484 ASSAY OF TROPONIN QUANT: CPT

## 2018-07-25 PROCEDURE — 99239 HOSP IP/OBS DSCHRG MGMT >30: CPT

## 2018-07-25 PROCEDURE — 85610 PROTHROMBIN TIME: CPT

## 2018-07-25 PROCEDURE — 93880 EXTRACRANIAL BILAT STUDY: CPT

## 2018-07-25 PROCEDURE — 93312 ECHO TRANSESOPHAGEAL: CPT

## 2018-07-25 PROCEDURE — 93005 ELECTROCARDIOGRAM TRACING: CPT

## 2018-07-25 PROCEDURE — 82607 VITAMIN B-12: CPT

## 2018-07-25 PROCEDURE — 80053 COMPREHEN METABOLIC PANEL: CPT

## 2018-07-25 PROCEDURE — 92610 EVALUATE SWALLOWING FUNCTION: CPT

## 2018-07-25 PROCEDURE — 82962 GLUCOSE BLOOD TEST: CPT

## 2018-07-25 PROCEDURE — 97116 GAIT TRAINING THERAPY: CPT

## 2018-07-25 PROCEDURE — 81241 F5 GENE: CPT

## 2018-07-25 PROCEDURE — 83735 ASSAY OF MAGNESIUM: CPT

## 2018-07-25 PROCEDURE — 82306 VITAMIN D 25 HYDROXY: CPT

## 2018-07-25 PROCEDURE — 97110 THERAPEUTIC EXERCISES: CPT

## 2018-07-25 PROCEDURE — 82550 ASSAY OF CK (CPK): CPT

## 2018-07-25 PROCEDURE — 93325 DOPPLER ECHO COLOR FLOW MAPG: CPT

## 2018-07-25 PROCEDURE — 84443 ASSAY THYROID STIM HORMONE: CPT

## 2018-07-25 PROCEDURE — 86225 DNA ANTIBODY NATIVE: CPT

## 2018-07-25 PROCEDURE — 93306 TTE W/DOPPLER COMPLETE: CPT

## 2018-07-25 PROCEDURE — 80061 LIPID PANEL: CPT

## 2018-07-25 PROCEDURE — 80048 BASIC METABOLIC PNL TOTAL CA: CPT

## 2018-07-25 PROCEDURE — 86038 ANTINUCLEAR ANTIBODIES: CPT

## 2018-07-25 PROCEDURE — 99285 EMERGENCY DEPT VISIT HI MDM: CPT | Mod: 25

## 2018-07-25 PROCEDURE — 83090 ASSAY OF HOMOCYSTEINE: CPT

## 2018-07-25 PROCEDURE — 97530 THERAPEUTIC ACTIVITIES: CPT

## 2018-07-25 PROCEDURE — 85300 ANTITHROMBIN III ACTIVITY: CPT

## 2018-07-25 PROCEDURE — 82553 CREATINE MB FRACTION: CPT

## 2018-07-25 PROCEDURE — 85027 COMPLETE CBC AUTOMATED: CPT

## 2018-07-25 PROCEDURE — 70551 MRI BRAIN STEM W/O DYE: CPT

## 2018-07-25 PROCEDURE — 86147 CARDIOLIPIN ANTIBODY EA IG: CPT

## 2018-07-25 PROCEDURE — 70450 CT HEAD/BRAIN W/O DYE: CPT

## 2018-07-25 PROCEDURE — 81291 MTHFR GENE: CPT

## 2018-07-25 PROCEDURE — 81240 F2 GENE: CPT

## 2018-07-25 PROCEDURE — 93320 DOPPLER ECHO COMPLETE: CPT

## 2018-07-25 PROCEDURE — 83036 HEMOGLOBIN GLYCOSYLATED A1C: CPT

## 2018-07-25 PROCEDURE — 71045 X-RAY EXAM CHEST 1 VIEW: CPT

## 2018-07-25 PROCEDURE — 86146 BETA-2 GLYCOPROTEIN ANTIBODY: CPT

## 2018-07-25 PROCEDURE — 97162 PT EVAL MOD COMPLEX 30 MIN: CPT

## 2018-07-25 RX ORDER — ATORVASTATIN CALCIUM 80 MG/1
80 TABLET, FILM COATED ORAL AT BEDTIME
Qty: 0 | Refills: 0 | Status: DISCONTINUED | OUTPATIENT
Start: 2018-07-25 | End: 2018-08-08

## 2018-07-25 RX ORDER — ESCITALOPRAM OXALATE 10 MG/1
10 TABLET, FILM COATED ORAL AT BEDTIME
Qty: 0 | Refills: 0 | Status: DISCONTINUED | OUTPATIENT
Start: 2018-07-25 | End: 2018-08-08

## 2018-07-25 RX ORDER — DIPHENHYDRAMINE HCL 50 MG
25 CAPSULE ORAL EVERY 4 HOURS
Qty: 0 | Refills: 0 | Status: DISCONTINUED | OUTPATIENT
Start: 2018-07-25 | End: 2018-07-31

## 2018-07-25 RX ORDER — NYSTATIN CREAM 100000 [USP'U]/G
1 CREAM TOPICAL
Qty: 0 | Refills: 0 | Status: DISCONTINUED | OUTPATIENT
Start: 2018-07-25 | End: 2018-08-08

## 2018-07-25 RX ORDER — ACETAMINOPHEN 500 MG
650 TABLET ORAL EVERY 6 HOURS
Qty: 0 | Refills: 0 | Status: DISCONTINUED | OUTPATIENT
Start: 2018-07-25 | End: 2018-08-08

## 2018-07-25 RX ORDER — CITALOPRAM 10 MG/1
20 TABLET, FILM COATED ORAL AT BEDTIME
Qty: 0 | Refills: 0 | Status: DISCONTINUED | OUTPATIENT
Start: 2018-07-25 | End: 2018-07-25

## 2018-07-25 RX ORDER — PANTOPRAZOLE SODIUM 20 MG/1
40 TABLET, DELAYED RELEASE ORAL
Qty: 0 | Refills: 0 | Status: DISCONTINUED | OUTPATIENT
Start: 2018-07-25 | End: 2018-08-08

## 2018-07-25 RX ORDER — ASPIRIN/CALCIUM CARB/MAGNESIUM 324 MG
81 TABLET ORAL DAILY
Qty: 0 | Refills: 0 | Status: DISCONTINUED | OUTPATIENT
Start: 2018-07-25 | End: 2018-08-08

## 2018-07-25 RX ORDER — HYDROCORTISONE 1 %
1 OINTMENT (GRAM) TOPICAL DAILY
Qty: 0 | Refills: 0 | Status: DISCONTINUED | OUTPATIENT
Start: 2018-07-25 | End: 2018-08-08

## 2018-07-25 RX ORDER — ENOXAPARIN SODIUM 100 MG/ML
40 INJECTION SUBCUTANEOUS EVERY 24 HOURS
Qty: 0 | Refills: 0 | Status: DISCONTINUED | OUTPATIENT
Start: 2018-07-25 | End: 2018-08-08

## 2018-07-25 RX ADMIN — PANTOPRAZOLE SODIUM 40 MILLIGRAM(S): 20 TABLET, DELAYED RELEASE ORAL at 06:31

## 2018-07-25 RX ADMIN — Medication 81 MILLIGRAM(S): at 11:53

## 2018-07-25 RX ADMIN — ESCITALOPRAM OXALATE 10 MILLIGRAM(S): 10 TABLET, FILM COATED ORAL at 22:17

## 2018-07-25 RX ADMIN — Medication 1 APPLICATION(S): at 11:53

## 2018-07-25 RX ADMIN — ENOXAPARIN SODIUM 40 MILLIGRAM(S): 100 INJECTION SUBCUTANEOUS at 11:53

## 2018-07-25 RX ADMIN — ATORVASTATIN CALCIUM 80 MILLIGRAM(S): 80 TABLET, FILM COATED ORAL at 22:17

## 2018-07-25 NOTE — H&P ADULT - ATTENDING COMMENTS
Pt. seen 7/26 AM. Agree with H&P documentation as per resident with amendments made.  No overnight issues.  Pt. denies pain, slept well.  Left thalamic infarct with extension to midbrain.  right sided sensory impairment, no motor deficits.  Left eye medially and inwardly deviated. Diplopia.  Visual fields impaired superiorly. +Dysarthria.  VSS, Labs reviewed WNL.  See progress note.   Vision assessment and therapy in OT--May use eye patch during therapies for symptom control but avoid using eye patch throughout entire day.  d/w pt.     Spoke with pt. regarding Rehab plan of care and discharge needs/expectations.  Pt. has house [facility] with her  who is retired and independent and she states can help her.

## 2018-07-25 NOTE — PROGRESS NOTE ADULT - PROBLEM SELECTOR PLAN 5
atorvastatin 40mg  lovenox 40mg subq daily  aspirin 81mg
IMPROVE VTE Individual Risk Assessment          RISK                                                          Points  [  ] Previous VTE                                                3  [  ] Thrombophilia                                             2  [  ] Lower limb paralysis                                   2        (unable to hold up >15 seconds)    [  ] Current Cancer                                             2         (within 6 months)  [  x] Immobilization > 24 hrs                              1  [  ] ICU/CCU stay > 24 hours                             1  [  ] Age > 60                                                         1    IMPROVE VTE Score: 1 , however at high risk , will start on Levonox for dvt ppx
atorvastatin 40mg  lovenox 40mg subq daily  aspirin 81mg
atorvastatin 40mg  lovenox 40mg subq daily  aspirin 81mg

## 2018-07-25 NOTE — H&P ADULT - NSHPREVIEWOFSYSTEMS_GEN_ALL_CORE
REVIEW OF SYSTEMS  Constitutional: No fever, No Chills, No fatigue  HEENT: No eye pain, No visual disturbances, No difficulty hearing, No Dysphagia   Pulm: No cough,  No shortness of breath  Cardio: No chest pain, No palpitations  GI:  No abdominal pain, No nausea, No vomiting, No diarrhea, No constipation, No incontinence  : No dysuria, No frequency, No hematuria, No incontinence  Neuro: No headaches, No memory loss, No loss of strength, No numbness, No tremors  Skin: No itching, No rashes, No lesions   Endo: No temperature intolerance  MSK: No joint pain, No joint swelling, No muscle pain, No Neck or back pain  Psych:  No depression, No anxiety    Any Major Surgery within past 100 days? No / Yes     Two or more Falls within past one year?  No / Yes                   One Fall with injury past one year?           No / Yes REVIEW OF SYSTEMS  Constitutional: No fever, No Chills, No fatigue  HEENT: Vision changes, double vision with both eyes open, Dysphagia, No difficulty hearing,   Pulm: No cough,  No shortness of breath  Cardio: No chest pain, No palpitations  GI:  No abdominal pain, No nausea, No vomiting, No diarrhea, No constipation, No incontinence  : No dysuria, No frequency, No hematuria, No incontinence  Neuro: No headaches, No memory loss, R sided weakness   Skin: No itching, No rashes, No lesions   Endo: No temperature intolerance  MSK: No joint pain, No joint swelling, No muscle pain, No Neck or back pain  Psych:  No depression, No anxiety    Any Major Surgery within past 100 days? No      Two or more Falls within past one year?  No                 One Fall with injury past one year?           No

## 2018-07-25 NOTE — PROGRESS NOTE ADULT - PROBLEM SELECTOR PLAN 2
Resolved
s/p lumpectomy
s/p lumpectomy and RT; In remission  Counseled to follow up with Oncologist for monitoring
s/p lumpectomy
s/p lumpectomy

## 2018-07-25 NOTE — H&P ADULT - NSHPSOCIALHISTORY_GEN_ALL_CORE
SOCIAL HISTORY  Smoking - Denied  EtOH - Denied   Drugs - Denied    FUNCTIONAL HISTORY  Lives alone in apartment with 2 flights stairs, no elevator access   Independent prior with Amubulation and ADLs.     CURRENT FUNCTIONAL STATUS  - Bed Mobility: CG   - Transfers: CG  - Gait: CG  - ADLs: to be evaluated SOCIAL HISTORY  Smoking - 2 ppd x 33 years, quit 10-11 years ago  EtOH - Denied   Drugs - Denied    FUNCTIONAL HISTORY  Lives alone in apartment with 2 flights stairs, no elevator access  Independent prior with Amubulation and ADLs.   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 with 5 JESS[address] where she lives with her  who is independent.   Independent prior with Amubulation and ADLs. 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

## 2018-07-25 NOTE — CHART NOTE - NSCHARTNOTEFT_GEN_A_CORE
Informed by  Silvia that insurance authorization has been approved for [facility], and transport for patient will arrive at 3:30pm. Family notified.

## 2018-07-25 NOTE — PROGRESS NOTE ADULT - PROVIDER SPECIALTY LIST ADULT
Cardiology
Hospitalist
Internal Medicine

## 2018-07-25 NOTE — PROGRESS NOTE ADULT - PROBLEM SELECTOR PROBLEM 2
Breast cancer
Leucocytosis
Breast cancer

## 2018-07-25 NOTE — H&P ADULT - ASSESSMENT
56 y/o F with h/o breast cancer s/p lumpectomy presented with sudden onset right sided weakness, slurred speech found to have acute left thalamic CVA now with gait instability, ADL and functional impairments.       #Left thalamic CVA  - start Comprehensive Rehab Program of PT/OT   - ASA, Statin, monitor BP  - ILR as outpatient to r/o occult arrhythmia     #Dysphagia  - Mechanical soft w/ thins  - SLP evaluation and treatment   - Aspiration precautions     #Anxiety   - Celexa, Ativan PRN     #Precautions / PROPHYLAXIS:   - Falls, Cardiac, Aspiration   - Pressure injury/Skin: Turn Q2hrs while in bed   - DVT: Lovenox    #Diet: Mechanical soft w/thin liquids DASH/TLC]        MEDICAL PROGNOSIS: GOOD                                   REHAB POTENTIAL: GOOD  ESTIMATED DISPOSITION: HOME                             ELOS: 10-14 Days   EXPECTED THERAPY:     P.T. 1hr/day       O.T. 1hr/day      S.L.P. 1hr/day     P&O Unnecessary     EXP FREQUENCY: 5 days per 7 day period     PRESCREEN COMPARISON I have reviewed the prescreen information and I have found no relevant changes between the preadmission screening and my post admission evaluation     RATIONALE FOR INPATIENT ADMISSION - Patient demonstrates the following: (check all that apply)  [X] Medically appropriate for rehabilitation admission  [X] Has attainable rehab goals with an appropriate initial discharge plan  [X] Has rehabilitation potential (expected to make a significant improvement within a reasonable period of time)   [X] Requires close medical management by a rehab physician, rehab nursing care, Hospitalist and comprehensive interdisciplinary team (including PT, OT, & or SLP, Prosthetics and Orthotics) 56 y/o F with h/o breast cancer s/p lumpectomy presented with sudden onset right sided weakness, slurred speech found to have acute left thalamic CVA now with gait instability, ADL and functional impairments.       #Left thalamic CVA  - start Comprehensive Rehab Program of PT/OT   - ASA, Statin, monitor BP  - ILR as outpatient to r/o occult arrhythmia     #Dysphagia  - Mechanical soft w/ thins  - SLP evaluation and treatment   - Aspiration precautions     #Anxiety   - Celexa, Ativan PRN     #Precautions / PROPHYLAXIS:   - Falls, Cardiac, Aspiration   - Pressure injury/Skin: Turn Q2hrs while in bed   - DVT: Lovenox    #Diet: Mechanical soft w/thin liquids DASH/TLC]        MEDICAL PROGNOSIS: GOOD                                   REHAB POTENTIAL: GOOD  ESTIMATED DISPOSITION: HOME                             ELOS: 10-14 Days   EXPECTED THERAPY:     P.T. 1hr/day       O.T. 1hr/day      S.L.P. 1hr/day     P&O Unnecessary     EXP FREQUENCY: 5 days per 7 day period     PRESCREEN COMPARISON I have reviewed the prescreen information and I have found no relevant changes between the preadmission screening and my post admission evaluation     RATIONALE FOR INPATIENT ADMISSION - Patient demonstrates the following: (check all that apply)  [X] Medically appropriate for rehabilitation admission  [X] Has attainable rehab goals with an appropriate initial discharge plan  [X] Has rehabilitation potential (expected to make a significant improvement within a reasonable period of time)   [X] Requires close medical management by a rehab physician, rehab nursing care, Hospitalist and comprehensive interdisciplinary team (including PT, OT, & or SLP, Prosthetics and Orthotics)    Dr. Garcia is aware

## 2018-07-25 NOTE — PROGRESS NOTE ADULT - PROBLEM SELECTOR PLAN 3
atorvastatin 40mg  lovenox 40mg subq daily  aspirin 81mg
lovenox 40mg subq daily
patient asks to be started back on home meds  celexa 20mg at bedtime  ativan 0.5mg q6 PRN
Lovenox 40mg SQ daily
atorvastatin 40mg  lovenox 40mg subq daily  aspirin 81mg
patient asks to be started back on home meds  celexa 20mg at bedtime  ativan 0.5mg q6 PRN

## 2018-07-25 NOTE — H&P ADULT - NSHPPHYSICALEXAM_GEN_ALL_CORE
Vital Signs  T(C): 36.5 (07-25-18 @ 11:08), Max: 36.9 (07-24-18 @ 15:20)  HR: 88 (07-25-18 @ 11:08) (72 - 89)  BP: 109/54 (07-25-18 @ 11:08) (97/44 - 113/44)  RR: 18 (07-25-18 @ 11:08) (17 - 18)  SpO2: 96% (07-25-18 @ 11:08) (92% - 96%)    Gen - NAD, Comfortable  HEENT - NCAT, EOMI, MMM  Neck - Supple, No limited ROM  Pulm - CTAB, No wheeze, No rhonchi, No crackles  Cardiovascular - RRR, S1S2, No murmurs  Abdomen - Soft, NT/ND, +BS  Extremities - No C/C/E, No calf tenderness  Neuro-     Cognitive - AAOx3     Communication - Fluent, No dysarthria     Attention: Intact      Judgement: Good evidence of judgement     Memory: Recall 3 objects immediate and 3 min later         Cranial Nerves - CN 2-12 intact     Motor - No focal deficits                    LEFT    UE - ShAB 5/5, EF 5/5, EE 5/5, WE 5/5,  5/5                    RIGHT UE - ShAB 3/5, EF 3/5, EE 3/5, WE 3/5,  3/5                    LEFT    LE - HF 5/5, KE 5/5, DF 5/5, PF 5/5                    RIGHT LE - HF 3/5, KE 3/5, DF 3/5, PF 3/5        Sensory - Intact to LT     Reflexes - DTR Intact, No primitive reflexive     Coordination - FTN intact     Tone - normal  Psychiatric - Mood stable, Affect WNL  Skin:  all skin intact    Wounds: None Present Vital Signs Last 24 Hrs  T(C): 36.6 (25 Jul 2018 15:59), Max: 36.6 (24 Jul 2018 20:21)  T(F): 97.9 (25 Jul 2018 15:59), Max: 97.9 (24 Jul 2018 20:21)  HR: 83 (25 Jul 2018 15:59) (72 - 89)  BP: 109/78 (25 Jul 2018 15:59) (97/44 - 113/44)  BP(mean): --  RR: 18 (25 Jul 2018 15:59) (17 - 18)  SpO2: 97% (25 Jul 2018 15:59) (95% - 97%)    Gen - NAD, Comfortable  HEENT - NCAT, Left eye with impaired lateral movement  Neck - Supple, No limited ROM  Pulm - CTAB, No wheeze, No rhonchi, No crackles  Cardiovascular - RRR, S1S2, No murmurs  Abdomen - Soft, NT/ND, +BS  Extremities - No C/C/E, No calf tenderness  Neuro-     Cognitive - AAOx3     Communication - Fluent, Dysarthria     Attention: Intact      Judgement: Good evidence of judgement     Memory: Recall 3 objects immediate and 3 min later         Cranial Nerves - Left CN VI (impaired lateral gaze) disconjugate gaze      Motor - No focal deficits                    LEFT    UE - ShAB 5/5, EF 5/5, EE 5/5, WE 5/5,  5/5                    RIGHT UE - ShAB 5/5, EF 5/5, EE 5/5, WE 5/5,  5/5                    LEFT    LE - HF 5/5, KE 5/5, DF 5/5, PF 5/5                    RIGHT LE - HF 4+/5, KE 4+/5, DF 5/5, PF 5/5        Sensory - Impaired to light touch on right arm/leg      Reflexes - DTR Intact, No primitive reflexive     Coordination - FTN impaired with dysmetria b/l      Tone - normal  Psychiatric - Mood stable, Affect WNL  Skin:  mild groin rash bilaterally, psoriatic plaques bilateral medial malleoli   Wounds: None Present Vital Signs Last 24 Hrs  T(C): 36.6 (25 Jul 2018 15:59), Max: 36.6 (24 Jul 2018 20:21)  T(F): 97.9 (25 Jul 2018 15:59), Max: 97.9 (24 Jul 2018 20:21)  HR: 83 (25 Jul 2018 15:59) (72 - 89)  BP: 109/78 (25 Jul 2018 15:59) (97/44 - 113/44)  BP(mean): --  RR: 18 (25 Jul 2018 15:59) (17 - 18)  SpO2: 97% (25 Jul 2018 15:59) (95% - 97%)    Gen - NAD, Comfortable  HEENT - NCAT, Left eye with impaired lateral movement  Neck - Supple, No limited ROM  Pulm - CTAB, No wheeze, No rhonchi, No crackles  Cardiovascular - RRR, S1S2, No murmurs  Abdomen - Soft, NT/ND, +BS  Extremities - No C/C/E, No calf tenderness  Neuro-     Cognitive - AAOx3     Communication - Fluent, Dysarthria     Attention: Intact      Judgement: Good evidence of judgement     Memory: Recall 3 objects immediate and 3 min later         Cranial Nerves - Left CN VI (impaired lateral gaze) disconjugate gaze,  Impaired in upper visual field,  diplopia when both eyes open,       Motor - No focal deficits                    LEFT    UE - ShAB 5/5, EF 5/5, EE 5/5, WE 5/5,  5/5                    RIGHT UE - ShAB 5/5, EF 5/5, EE 5/5, WE 5/5,  5/5                    LEFT    LE - HF 5/5, KE 5/5, DF 5/5, PF 5/5                    RIGHT LE - HF 4+/5, KE 4+/5, DF 5/5, PF 5/5        Sensory - Impaired to light touch on right arm/leg      Reflexes - DTR Intact, No primitive reflexive     Coordination - FTN impaired with dysmetria b/l, HTS intact     Tone - normal  Psychiatric - Mood stable, Affect WNL  Skin:  mild groin rash bilaterally, psoriatic plaques bilateral medial malleoli   Wounds: None Present

## 2018-07-25 NOTE — PROGRESS NOTE ADULT - PROBLEM SELECTOR PROBLEM 3
Anxiety
Depression
Prophylactic measure
Anxiety

## 2018-07-25 NOTE — H&P ADULT - NSHPLABSRESULTS_GEN_ALL_CORE
Comprehensive Metabolic Panel (07.23.18 @ 06:44)    Sodium, Serum: 137 mmol/L    Potassium, Serum: 3.9 mmol/L    Chloride, Serum: 106 mmol/L    Carbon Dioxide, Serum: 23 mmol/L    Anion Gap, Serum: 8 mmol/L    Blood Urea Nitrogen, Serum: 13 mg/dL    Creatinine, Serum: 0.83 mg/dL    Glucose, Serum: 89 mg/dL    Calcium, Total Serum: 9.1 mg/dL    Protein Total, Serum: 7.1 g/dL    Albumin, Serum: 3.1 g/dL    Bilirubin Total, Serum: 1.4 mg/dL    Alkaline Phosphatase, Serum: 112 U/L    Aspartate Aminotransferase (AST/SGOT): 47 U/L    Alanine Aminotransferase (ALT/SGPT): 65 U/L DA    Complete Blood Count + Automated Diff (07.22.18 @ 08:17)    WBC Count: 8.0 K/uL    RBC Count: 4.86 M/uL    Hemoglobin: 13.7 g/dL    Hematocrit: 42.2 %    Mean Cell Volume: 87.0 fl    Mean Cell Hemoglobin: 28.1 pg    Mean Cell Hemoglobin Conc: 32.3 gm/dL    Red Cell Distrib Width: 12.6 %    Platelet Count - Automated: 218 K/uL    Auto Neutrophil #: 5.9 K/uL    Auto Lymphocyte #: 1.1 K/uL    Auto Monocyte #: 0.6 K/uL    Auto Eosinophil #: 0.3 K/uL    Auto Basophil #: 0.1 K/uL    Auto Neutrophil %: 73.4: Differential percentages must be correlated with absolute numbers for  clinical significance. %    Auto Lymphocyte %: 13.5 %    Auto Monocyte %: 8.0 %    Auto Eosinophil %: 3.9 %    Auto Basophil %: 1.2     RADIOLOGY, EKG & ADDITIONAL TESTS: Reviewed  MR Head No Cont (07.18.18 @ 16:59)     FINDINGS:  There is mild diffuse parenchymal volume loss. There are T2 prolongation   signal abnormalities in the periventricular white matter likely related   to mild chronic microvascular ischemic changes.    Small chronic infarct posterior right frontal lobe.    Acute infarct left thalamus 1.2 x 0.9 cm extending to left midbrain.    Small chronic infarcts right cerebellum.    There is no acute parenchymal hemorrhage, parenchymal mass, or midline   shift. There is no extra-axial fluid collection.  There is no   hydrocephalus.    Mucosal thickening paranasal sinuses.    IMPRESSION:  Incomplete exam.  Small acute infarct left thalamus.  No acute intracranial hemorrhage.

## 2018-07-25 NOTE — PROGRESS NOTE ADULT - PROBLEM SELECTOR PLAN 1
On aspirin and atorvastatin 40 mg po daily   PT recommended acute rehab: awaiting acceptance to rehab  Plan for outpatient loop recorder
US carotids done; no abnormalities   Awaiting MRI of brain   Scheduled for JOSHUA with bubble studies tomorrow   On aspirin and Atorvastatin 40 mg po daily.  Mechanical soft diet
neuro following  CT head showed acute/subacute left thalamic infarct  MRI shows acute infarct of thalamus with midbrain and cerebellar involvement  PT recommends acute rehab - pending authorization  Clinically stable and pending transfer to acute rehab
neuro following  CT head showed acute/subacute left thalamic infarct  MRI shows acute infarct of thalamus with midbrain and cerebellar involvement  PT recommends acute rehab - pending authorization  Clinically stable at this time and pending transfer to acute rehab, likely tomorrow as per 
On aspirin and atorvastatin 40 mg po daily   PT recommended acute rehab: awaiting acceptance to rehab ([facility])  As per  Mirella, still await acceptance   Plan for outpatient loop recorder to evaluate for A. Fib; F/U with Dr. Baeza EP
neuro following  CT head showed acute/subacute left thalamic infarct  MRI results pending
neuro following  CT head showed acute/subacute left thalamic infarct  MRI shows acute infarct of thalamus with midbrain and cerebellar involvement  PT recommends acute rehab - pending authorization  Clinically stable and pending transfer to acute rehab

## 2018-07-25 NOTE — H&P ADULT - HISTORY OF PRESENT ILLNESS
Ms. Naila Banegas is a 57 year old female with PMH of breast CA s/p lumpectomy, anxiety, former smoker who presented to [facility] ED c/o sudden onset right upper and lower extremity weakness, slurred speech and facial droop which had started the evening prior to presentation. Patient initially went to sleep after onset of symptoms hoping they would resolve, but awoke to find they had worsened.  Upon presentation NIHSS = 5, CT head showed acute/subacute left thalamic infarct, outside of tPA window. MRI Head shows left thalamic infarct with extension to midbrain without hemorrhage. Carotid doppler showed no significant stenosis.  TTE showed normal LVEF, mild diastolic (stage I) dysfunction, without evidence of structural abnormality or thrombus.  Hypercoagulable workup was unrevealing.  Etiology of stroke thought cardioembolic. Recommended by cardiology to have ILR placed as outpatient to rule out occult arrhythmia.  Failed bedside swallow evaluation and placed on mechanical soft with thins.  Deemed medically stable for acute rehab on 7/25/18.    Upon evaluation ___. Ms. Naila Banegas is a 57 year old female with PMH of left breast CA s/p 6-week course of radiation, lumpectomy, depression, former smoker who presented to [facility] ED c/o sudden onset right upper and lower extremity weakness, slurred speech and facial droop which had started the evening prior to presentation. Patient initially went to sleep after onset of symptoms hoping they would resolve, but awoke to find they had worsened.  Upon presentation NIHSS = 5, CT head showed acute/subacute left thalamic infarct, outside of tPA window. MRI Head shows left thalamic infarct with extension to midbrain without hemorrhage. Carotid doppler showed no significant stenosis.  TTE showed normal LVEF, mild diastolic (stage I) dysfunction, without evidence of structural abnormality or thrombus.  Hypercoagulable workup was unrevealing.  Etiology of stroke thought cardioembolic. Recommended by cardiology to have ILR placed as outpatient to rule out occult arrhythmia.  Failed bedside swallow evaluation and placed on mechanical soft with thins.  Deemed medically stable for acute rehab on 7/25/18.    Upon evaluation patient states feels residual right sided weakness.  Endorses double vision with both eyes open which improves by closing left eye.  She wore glasses prior to stroke which improved vision but now vision is worse.  Denies headache, palpitations, numbness/tingling, bowel/bladder incontinence.  She is hungry and requesting something to eat.

## 2018-07-25 NOTE — PROGRESS NOTE ADULT - SUBJECTIVE AND OBJECTIVE BOX
PGY1 Note discussed with supervising resident and primary attending.    Patient is a 57y old  Female who presents with a chief complaint of weakness of right upper and le (17 Jul 2018 17:42)      INTERVAL HPI/OVERNIGHT EVENTS:    Ms. Banegas is seen at the beside. She has no complaints at this time and reports feeling well. She and her  are awaiting insurance authorization for acute rehab at [place].    MEDICATIONS  (STANDING):  aspirin  chewable 81 milliGRAM(s) Oral daily  atorvastatin 80 milliGRAM(s) Oral at bedtime  citalopram 20 milliGRAM(s) Oral at bedtime  enoxaparin Injectable 40 milliGRAM(s) SubCutaneous daily  hydrocortisone 1% Cream 1 Application(s) Topical daily  pantoprazole    Tablet 40 milliGRAM(s) Oral before breakfast    MEDICATIONS  (PRN):  diphenhydrAMINE   Capsule 25 milliGRAM(s) Oral every 4 hours PRN Rash and/or Itching  LORazepam     Tablet 0.5 milliGRAM(s) Oral every 6 hours PRN Anxiety    Allergies    penicillin (Unknown)    Intolerances    REVIEW OF SYSTEMS:  CONSTITUTIONAL: No fever, weight loss, or fatigue  RESPIRATORY: No cough, wheezing, chills or hemoptysis; No shortness of breath  CARDIOVASCULAR: No chest pain, palpitations, dizziness, or leg swelling  GASTROINTESTINAL: No abdominal or epigastric pain. No nausea, vomiting, or hematemesis; No diarrhea or constipation. No melena or hematochezia.  NEUROLOGICAL: No headaches, memory loss, loss of strength, numbness, or tremors    Vital Signs Last 24 Hrs  T(C): 36.5 (25 Jul 2018 11:08), Max: 36.9 (24 Jul 2018 15:20)  T(F): 97.7 (25 Jul 2018 11:08), Max: 98.5 (24 Jul 2018 15:20)  HR: 88 (25 Jul 2018 11:08) (72 - 89)  BP: 109/54 (25 Jul 2018 11:08) (97/44 - 113/44)  BP(mean): --  RR: 18 (25 Jul 2018 11:08) (17 - 18)  SpO2: 96% (25 Jul 2018 11:08) (92% - 96%)    PHYSICAL EXAM:  GENERAL: patient appears uncomfortable in bed  HEAD:  prominent facial droop  EYES: deviation of left eye  NECK: Supple, No JVD, Normal thyroid  CHEST/LUNG: Clear to percussion bilaterally; No rales, rhonchi, wheezing, or rubs  HEART: Regular rate and rhythm; No murmurs, rubs, or gallops  ABDOMEN: Soft, Nontender, Nondistended; Bowel sounds present  NERVOUS SYSTEM:  Alert & Oriented X3, Good concentration; patient feels weak on right side of body   EXTREMITIES:  2-3/5 motor strength of right upper and lower extremities. 5/5 on left extremities  SKIN: b/l psoriatic rashes on ankles - not painful or pruritic                 MR:    IMPRESSION:  Incomplete exam.    Small acute infarct left thalamus.    No acute intracranial hemorrhage.    JOSHUA:    CONCLUSIONS:  1. No left atrial or left atrial appendage thrombus. Left  atrial appendage velocity is normal at  0.54 m/s.    *** Compared with echocardiogram of 7/18/2018, a limited  JOSHUA was performed and additional information provided.

## 2018-07-25 NOTE — PROGRESS NOTE ADULT - ATTENDING COMMENTS
Thank you for the courtesy of a consultation, please contact me for any additional questions
Agree with all above    Seen and examined at bedside: still has facial droop, slight slurry speech, left eye deviation and weakness.   ROS and PE as above     Vital Signs Last 24 Hrs  T(C): 37.1 (19 Jul 2018 15:59), Max: 37.1 (19 Jul 2018 08:12)  T(F): 98.8 (19 Jul 2018 15:59), Max: 98.8 (19 Jul 2018 08:12)  HR: 87 (19 Jul 2018 15:59) (68 - 91)  BP: 98/51 (19 Jul 2018 15:59) (91/51 - 115/55)  BP(mean): --  RR: 18 (19 Jul 2018 15:59) (18 - 18)  SpO2: 97% (19 Jul 2018 15:59) (94% - 97%)    1. Embolic stoke   Currently on aspirin and statin   No afib on telemetry   JOSHUA done with no abnormalities   Plan for loop recorder as outpatient.     PT eval; K[facility]  Plan for discharge, awaiting placement.     Plan of care discussed with patient and her  at bedside
plan of care discussed with patient and her  in detail.
Discussed with Patient and spouse at bedside and reviewed plan of care, need for ILR and discharge pending acceptance to facility.  They were also educated Rx plan will change if any evidence of Arrhythmia like A. Fib detected   As per Spouse, they will follow up with their Cardio first after Rehab  Patient on IV PPI; switch to PO
Patient is seen and examined at bedside, feeling ok, her  at her side.   They are frustrated for repeated delay to dc to acute rehab. Tele re-ordered for her for the duration of her stay in the hospital  Physical exam:   General : in NAD  HEENT: Neck supple  heart: S1 S2 normal  Abdomen: NT< ND  Chest": Clear  LE: No LE edema  Neuro exam: bilateral paralysis of upward gaze.  left eye: unable to look to the left, tending to be in inferior medial position    < from: MR Head No Cont (07.18.18 @ 16:59) >    FINDINGS:  There is mild diffuse parenchymal volume loss. There are T2 prolongation   signal abnormalities in the periventricular white matter likely related   to mild chronic microvascular ischemic changes.    Small chronic infarct posterior right frontal lobe.    Acute infarct left thalamus 1.2 x 0.9 cm extending to left midbrain.    Small chronic infarcts right cerebellum.    There is no acute parenchymal hemorrhage, parenchymal mass, or midline   shift. There is no extra-axial fluid collection.  There is no   hydrocephalus.    Mucosal thickening paranasal sinuses.    IMPRESSION:  Incomplete exam.    Small acute infarct left thalamus.    No acute intracranial hemorrhage.    < end of copied text >      A/p  1- Embolic stroke:   Tele monitor has been uneventfull  ECHo with bubble study negative, JOSHUA negative for any appendiceal thrombus.   carotid doppler negative.   Explained again to the patient and her  the importance of having a loop recorder inserted to track for any episode of A fib.   cardio input appreciated  BP lower side on no BP meds  continue asa, heparin and statins.   Pending dc to rehab    2- History of breast cancer:   to follow up post dc    3- Low norm vitamin b12: supplement vit b12 IM    rest as above.
Patient seen and examined at bedside, feeling ok, her  at his side.  She is waiting for placement for acute rehab, OOB to chair today.   PHysical exam:  vitals stable  HEENT: Neck supple  Heart: S1 S2 normal  Abdomen: NT, ND  Chest: Clear  LE: No LE edema  Neurological: slurring of speech still persistent  Left eye limitation of lateral gaze, has inferio- medial position  Both eye: paralysis of upward gaze.  A/P  1- Embolic stroke: discussed with dr. owusu from Neurology if patient will benefit from anticoagulation  to prevent any further strokes. From his stand point, he is advising against to avoid any complication from anticoagulation   as long as no evidence for the source of embolization.  This was communicated to the patient and her  and they understand.   Loop recorder once discharged  Tele monitor uneventful  continue asa, lipitor  BP under control with no meds.     rest as above.
Patient was seen and examined at bedside, feeling ok today, her  at her side.  Her BP continues to be low norm, She was accepted at acute care facility and insurance auth obtained.   Physical exam  HEENT: Neck supple  heart: S1 S2 normal  Abdomen: NT, ND  chest: congested, no rales appreciated  LE: no LE edema  A/P  1- Embolic stroke:  continue asa, statin,  heparin subQ.  Acute rehab placement today.  Patient will also benefit from speech therapy.  Loop recorder as outpatient.  DC to rehab.
Patient was seen and examined at bedside, feeling ok today, her  at her side.  Her BP continues to be low norm, stating feeling SOB at night.   physical exam  HEENT: Neck supple  heart: S1 S2 normal  Abdomen: NT, ND  chest: congested, no rales appreciated  LE: no LE edema  A/P  1- Embolic stroke:  continue asa, statin,  heparin subQ.  pending authorization for acute rehab placement  Patient will also benefit from speech therapy.  Loop recorder as outpatient.   2- congested chest:  will give oral lasix 20 mg x 1 dose only  rest as above

## 2018-07-25 NOTE — PROGRESS NOTE ADULT - PROBLEM SELECTOR PROBLEM 1
CVA (cerebral vascular accident)
Embolic stroke

## 2018-07-25 NOTE — PROGRESS NOTE ADULT - PROBLEM SELECTOR PLAN 4
h/o stage 1 cancer of Left breast, s/p Lumpectomy   In remission
atorvastatin 40mg  lovenox 40mg subq daily  aspirin 81mg
nontender, nonpruritic  topical hydrocortisone 1% daily
nontender, nonpruritic  topical hydrocortisone 1% daily
Replace orally possibly decreased oral intake  Repeat Monday
nontender, nonpruritic  topical hydrocortisone 1% daily

## 2018-07-26 DIAGNOSIS — F33.1 MAJOR DEPRESSIVE DISORDER, RECURRENT, MODERATE: ICD-10-CM

## 2018-07-26 DIAGNOSIS — C50.919 MALIGNANT NEOPLASM OF UNSPECIFIED SITE OF UNSPECIFIED FEMALE BREAST: ICD-10-CM

## 2018-07-26 DIAGNOSIS — I63.9 CEREBRAL INFARCTION, UNSPECIFIED: ICD-10-CM

## 2018-07-26 LAB
ALBUMIN SERPL ELPH-MCNC: 3.3 G/DL — SIGNIFICANT CHANGE UP (ref 3.3–5)
ALP SERPL-CCNC: 118 U/L — SIGNIFICANT CHANGE UP (ref 40–120)
ALT FLD-CCNC: 97 U/L DA — HIGH (ref 10–45)
ANION GAP SERPL CALC-SCNC: 8 MMOL/L — SIGNIFICANT CHANGE UP (ref 5–17)
AST SERPL-CCNC: 76 U/L — HIGH (ref 10–40)
BASOPHILS # BLD AUTO: 0.1 K/UL — SIGNIFICANT CHANGE UP (ref 0–0.2)
BASOPHILS NFR BLD AUTO: 1 % — SIGNIFICANT CHANGE UP (ref 0–2)
BILIRUB SERPL-MCNC: 1.1 MG/DL — SIGNIFICANT CHANGE UP (ref 0.2–1.2)
BUN SERPL-MCNC: 17 MG/DL — SIGNIFICANT CHANGE UP (ref 7–23)
CALCIUM SERPL-MCNC: 9.6 MG/DL — SIGNIFICANT CHANGE UP (ref 8.4–10.5)
CHLORIDE SERPL-SCNC: 102 MMOL/L — SIGNIFICANT CHANGE UP (ref 96–108)
CO2 SERPL-SCNC: 27 MMOL/L — SIGNIFICANT CHANGE UP (ref 22–31)
CREAT SERPL-MCNC: 0.85 MG/DL — SIGNIFICANT CHANGE UP (ref 0.5–1.3)
EOSINOPHIL # BLD AUTO: 0.3 K/UL — SIGNIFICANT CHANGE UP (ref 0–0.5)
EOSINOPHIL NFR BLD AUTO: 3.2 % — SIGNIFICANT CHANGE UP (ref 0–6)
GLUCOSE SERPL-MCNC: 74 MG/DL — SIGNIFICANT CHANGE UP (ref 70–99)
HCT VFR BLD CALC: 50 % — HIGH (ref 34.5–45)
HGB BLD-MCNC: 16.8 G/DL — HIGH (ref 11.5–15.5)
LYMPHOCYTES # BLD AUTO: 1.7 K/UL — SIGNIFICANT CHANGE UP (ref 1–3.3)
LYMPHOCYTES # BLD AUTO: 18.8 % — SIGNIFICANT CHANGE UP (ref 13–44)
MCHC RBC-ENTMCNC: 28.9 PG — SIGNIFICANT CHANGE UP (ref 27–34)
MCHC RBC-ENTMCNC: 33.5 GM/DL — SIGNIFICANT CHANGE UP (ref 32–36)
MCV RBC AUTO: 86.2 FL — SIGNIFICANT CHANGE UP (ref 80–100)
MONOCYTES # BLD AUTO: 0.8 K/UL — SIGNIFICANT CHANGE UP (ref 0–0.9)
MONOCYTES NFR BLD AUTO: 9 % — SIGNIFICANT CHANGE UP (ref 1–9)
NEUTROPHILS # BLD AUTO: 6.2 K/UL — SIGNIFICANT CHANGE UP (ref 1.8–7.4)
NEUTROPHILS NFR BLD AUTO: 68 % — SIGNIFICANT CHANGE UP (ref 43–77)
PLATELET # BLD AUTO: 251 K/UL — SIGNIFICANT CHANGE UP (ref 150–400)
POTASSIUM SERPL-MCNC: 3.9 MMOL/L — SIGNIFICANT CHANGE UP (ref 3.5–5.3)
POTASSIUM SERPL-SCNC: 3.9 MMOL/L — SIGNIFICANT CHANGE UP (ref 3.5–5.3)
PREALB SERPL-MCNC: 17 MG/DL — LOW (ref 20–40)
PROT SERPL-MCNC: 7.9 G/DL — SIGNIFICANT CHANGE UP (ref 6–8.3)
RBC # BLD: 5.8 M/UL — HIGH (ref 3.8–5.2)
RBC # FLD: 12.3 % — SIGNIFICANT CHANGE UP (ref 10.3–14.5)
SODIUM SERPL-SCNC: 137 MMOL/L — SIGNIFICANT CHANGE UP (ref 135–145)
WBC # BLD: 9.2 K/UL — SIGNIFICANT CHANGE UP (ref 3.8–10.5)
WBC # FLD AUTO: 9.2 K/UL — SIGNIFICANT CHANGE UP (ref 3.8–10.5)

## 2018-07-26 PROCEDURE — 93010 ELECTROCARDIOGRAM REPORT: CPT

## 2018-07-26 PROCEDURE — 99222 1ST HOSP IP/OBS MODERATE 55: CPT

## 2018-07-26 PROCEDURE — 99255 IP/OBS CONSLTJ NEW/EST HI 80: CPT

## 2018-07-26 RX ADMIN — ATORVASTATIN CALCIUM 80 MILLIGRAM(S): 80 TABLET, FILM COATED ORAL at 21:22

## 2018-07-26 RX ADMIN — Medication 81 MILLIGRAM(S): at 14:17

## 2018-07-26 RX ADMIN — Medication 1 TABLET(S): at 11:27

## 2018-07-26 RX ADMIN — NYSTATIN CREAM 1 APPLICATION(S): 100000 CREAM TOPICAL at 05:57

## 2018-07-26 RX ADMIN — Medication 1 APPLICATION(S): at 11:28

## 2018-07-26 RX ADMIN — ENOXAPARIN SODIUM 40 MILLIGRAM(S): 100 INJECTION SUBCUTANEOUS at 14:17

## 2018-07-26 RX ADMIN — NYSTATIN CREAM 1 APPLICATION(S): 100000 CREAM TOPICAL at 18:47

## 2018-07-26 RX ADMIN — PANTOPRAZOLE SODIUM 40 MILLIGRAM(S): 20 TABLET, DELAYED RELEASE ORAL at 06:02

## 2018-07-26 RX ADMIN — ESCITALOPRAM OXALATE 10 MILLIGRAM(S): 10 TABLET, FILM COATED ORAL at 21:22

## 2018-07-26 NOTE — CHART NOTE - NSCHARTNOTEFT_GEN_A_CORE
Upon Nutritional Assessment by the Registered Dietitian your patient was determined to meet criteria / has evidence of the following diagnosis/diagnoses:          [ ]  Mild Protein Calorie Malnutrition        [x]  Moderate Protein Calorie Malnutrition        [ ] Severe Protein Calorie Malnutrition        [ ] Unspecified Protein Calorie Malnutrition        [ ] Underweight / BMI <19        [ ] Morbid Obesity / BMI > 40      Findings as based on:  [x] Comprehensive nutrition assessment   [ ] Nutrition Focused Physical Exam  [x] Other: 6% weight loss x 1week, <50% po intake x 1 week       Nutrition Plan/Recommendations:  Continue mechanical soft c thin liquids, DASH/TLC (or diet consistency as per SLP) + add Ensure Enlive 8oz po daily (chocolate) to provide 350kcals, 20g protein. Weights 3x/week.         PROVIDER Section:     By signing this assessment you are acknowledging and agree with the diagnosis/diagnoses assigned by the Registered Dietitian    Comments:

## 2018-07-26 NOTE — PROGRESS NOTE ADULT - ASSESSMENT
58 y/o F with h/o breast cancer s/p lumpectomy presented with sudden onset right sided weakness, slurred speech found to have acute left thalamic CVA now with gait instability, ADL and functional impairments.       #Left thalamic CVA  - start Comprehensive Rehab Program of PT/OT   - ASA, Statin, monitor BP  - ILR as outpatient to r/o occult arrhythmia     --Diplopia--Left abducens nerve palsy  Vision assessment and therapy in OT--May use eye patch during therapies for symptom control but avoid using eye patch throughout entire day.  Will need neurooptometry assessment as outpatient.     #Dysphagia/dysarthria  - Mechanical soft w/ thins  - SLP evaluation and treatment   - Aspiration precautions     #Anxiety/depression   - Lexapro, Ativan PRN     #Precautions / PROPHYLAXIS:   - Falls, Cardiac, Aspiration   - Pressure injury/Skin: Turn Q2hrs while in bed   - DVT: Lovenox    #Diet: Mechanical soft w/thin liquids DASH/TLC]

## 2018-07-26 NOTE — DIETITIAN INITIAL EVALUATION ADULT. - NS AS NUTRI INTERV MEALS SNACK
Continue current diet: mechanical soft c thins, DASH/TLC (diet consistency as per SLP) + weights 3x/week

## 2018-07-26 NOTE — CONSULT NOTE ADULT - PROBLEM SELECTOR RECOMMENDATION 9
PT/OT/SLP as tolerated   continue ASA/STATIN  cardio workup as outpatient for embolic etiology   DVT/GI ppx

## 2018-07-26 NOTE — CONSULT NOTE ADULT - ASSESSMENT
56 y/o F with PMH breast cancer, depression admitted with right sided weakness, dysarthria, facial droop, found to have left thalamic CVA

## 2018-07-26 NOTE — PROGRESS NOTE ADULT - SUBJECTIVE AND OBJECTIVE BOX
HPI:  Ms. Naila Banegas is a 57 year old female with PMH of left breast CA s/p 6-week course of radiation, lumpectomy, depression, former smoker who presented to [facility] ED c/o sudden onset right upper and lower extremity weakness, slurred speech and facial droop which had started the evening prior to presentation. Patient initially went to sleep after onset of symptoms hoping they would resolve, but awoke to find they had worsened.  Upon presentation NIHSS = 5, CT head showed acute/subacute left thalamic infarct, outside of tPA window. MRI Head shows left thalamic infarct with extension to midbrain without hemorrhage. Carotid doppler showed no significant stenosis.  TTE showed normal LVEF, mild diastolic (stage I) dysfunction, without evidence of structural abnormality or thrombus.  Hypercoagulable workup was unrevealing.  Etiology of stroke thought cardioembolic. Recommended by cardiology to have ILR placed as outpatient to rule out occult arrhythmia.  Failed bedside swallow evaluation and placed on mechanical soft with thins.  Deemed medically stable for acute rehab on 7/25/18.    Upon evaluation patient states feels residual right sided weakness.  Endorses double vision with both eyes open which improves by closing left eye.  She wore glasses prior to stroke which improved vision but now vision is worse.  Denies headache, palpitations, numbness/tingling, bowel/bladder incontinence.  She is hungry and requesting something to eat. (25 Jul 2018 15:06)      PAST MEDICAL & SURGICAL HISTORY:  Breast cancer: s/p Lumpectomy  No significant past surgical history      Subjective: No issues overnight, slept well, no pain.  Diplopia    REVIEW OF SYMPTOMS  [X] Constitutional WNL     [X] Cardio WNL            [X] Resp WNL           [X] GI WNL                      [X]  WNL                 [X] Heme WNL              [X] Endo WNL                  [X] Skin--Psoarisis              [X] MSK--        [X] Neuro--right sensory deficit, diplopia                  [X] Cognitive WNL        [X] Psych-anxiety      VITALS  Vital Signs Last 24 Hrs  T(C): 36.7 (26 Jul 2018 09:47), Max: 36.7 (25 Jul 2018 22:12)  T(F): 98.1 (26 Jul 2018 09:47), Max: 98.1 (25 Jul 2018 22:12)  HR: 72 (26 Jul 2018 09:47) (68 - 83)  BP: 96/66 (26 Jul 2018 09:47) (96/66 - 115/75)  BP(mean): --  RR: 14 (26 Jul 2018 09:47) (14 - 18)  SpO2: 95% (26 Jul 2018 09:47) (93% - 97%)      PHYSICAL EXAM  Gen - NAD, Comfortable  	HEENT - NCAT, Left eye with impaired lateral movement  	Neck - Supple, No limited ROM  	Pulm - CTAB, No wheeze, No rhonchi, No crackles  	Cardiovascular - RRR, S1S2, No murmurs  	Abdomen - Soft, NT/ND, +BS  	Extremities - No C/C/E, No calf tenderness  	Neuro-  	   Cognitive - AAOx3  	   Communication - Fluent, Dysarthria  	   Attention: Intact   	   Judgement: Good evidence of judgement  	   Memory: Recall 3 objects immediate and 3 min later      	   Cranial Nerves - Left CN VI (impaired lateral gaze) disconjugate gaze,  Impaired in upper visual field,  diplopia when both eyes open,    	   Motor - No focal deficits  	                  LEFT    UE - ShAB 5/5, EF 5/5, EE 5/5, WE 5/5,  5/5  	                  RIGHT UE - ShAB 5/5, EF 5/5, EE 5/5, WE 5/5,  5/5  	                  LEFT    LE - HF 5/5, KE 5/5, DF 5/5, PF 5/5  	                  RIGHT LE - HF 4+/5, KE 4+/5, DF 5/5, PF 5/5     	   Sensory - Impaired to light touch on right arm/leg   	   Reflexes - DTR Intact,  	   Coordination - FTN impaired with dysmetria b/l, HTS intact  	   Tone - normal  	Psychiatric - Mood stable, Affect WNL  	Skin:  mild groin rash bilaterally, psoriatic plaques bilateral medial malleoli   FUNCTIONAL PROGRESS  Gait - EVAL  ADLs - EVAL   Transfers - EVAL  Functional transfer - EVAL    RECENT LABS                        16.8   9.2   )-----------( 251      ( 26 Jul 2018 05:30 )             50.0     07-26    137  |  102  |  17  ----------------------------<  74  3.9   |  27  |  0.85    Ca    9.6      26 Jul 2018 05:30    TPro  7.9  /  Alb  3.3  /  TBili  1.1  /  DBili  x   /  AST  76<H>  /  ALT  97<H>  /  AlkPhos  118  07-26            RADIOLOGY/OTHER RESULTS      MEDICATIONS  (STANDING):  aspirin  chewable 81 milliGRAM(s) Oral daily  atorvastatin 80 milliGRAM(s) Oral at bedtime  enoxaparin Injectable 40 milliGRAM(s) SubCutaneous every 24 hours  escitalopram 10 milliGRAM(s) Oral at bedtime  hydrocortisone 1% Cream 1 Application(s) Topical daily  multivitamin 1 Tablet(s) Oral daily  nystatin Powder 1 Application(s) Topical two times a day  pantoprazole    Tablet 40 milliGRAM(s) Oral before breakfast    MEDICATIONS  (PRN):  acetaminophen   Tablet 650 milliGRAM(s) Oral every 6 hours PRN For Temp greater than 38 C (100.4 F)  acetaminophen   Tablet. 650 milliGRAM(s) Oral every 6 hours PRN Mild Pain (1 - 3)  diphenhydrAMINE   Capsule 25 milliGRAM(s) Oral every 4 hours PRN Rash and/or Itching  LORazepam     Tablet 0.5 milliGRAM(s) Oral every 6 hours PRN Anxiety

## 2018-07-26 NOTE — CONSULT NOTE ADULT - SUBJECTIVE AND OBJECTIVE BOX
LUCIANO ESTRADA 57y Female    :  58 y/o F with PMH below presented with right sided weakness, slurred speech and facial droop. CT head showed acute/subacute left thalamic infarct, outside of tPA window. MRI Head shows left thalamic infarct with extension to midbrain without hemorrhage. Carotid doppler showed no significant stenosis.  TTE showed normal LVEF, mild diastolic (stage I) dysfunction, without evidence of structural abnormality or thrombus.  Hypercoagulable workup was negative.  Etiology of stroke thought cardioembolic. ILR recommended as outpatient  Patient currently feels well, denies complaints. Had therapy this morning and feels fine afterward     PAST MEDICAL & SURGICAL HISTORY:  Breast cancer: s/p Lumpectomy, radiation   depression     Family hx reviewed and non contributory         Substance Use (street drugs): (  ) never used  (  ) other:  Tobacco Usage:  (   ) never smoked   (+   ) former smoker   (   ) current smoker  (     ) pack year  Alcohol Usage:  Sexual History:   Recent Travel:      Allergies    penicillin (Unknown)    Intolerances        MEDICATIONS  (STANDING):  aspirin  chewable 81 milliGRAM(s) Oral daily  atorvastatin 80 milliGRAM(s) Oral at bedtime  enoxaparin Injectable 40 milliGRAM(s) SubCutaneous every 24 hours  escitalopram 10 milliGRAM(s) Oral at bedtime  hydrocortisone 1% Cream 1 Application(s) Topical daily  multivitamin 1 Tablet(s) Oral daily  nystatin Powder 1 Application(s) Topical two times a day  pantoprazole    Tablet 40 milliGRAM(s) Oral before breakfast    MEDICATIONS  (PRN):  acetaminophen   Tablet 650 milliGRAM(s) Oral every 6 hours PRN For Temp greater than 38 C (100.4 F)  acetaminophen   Tablet. 650 milliGRAM(s) Oral every 6 hours PRN Mild Pain (1 - 3)  diphenhydrAMINE   Capsule 25 milliGRAM(s) Oral every 4 hours PRN Rash and/or Itching  LORazepam     Tablet 0.5 milliGRAM(s) Oral every 6 hours PRN Anxiety        REVIEW OF SYSTEMS:  CONSTITUTIONAL: No fever, weight loss, or fatigue  RESPIRATORY: No cough, wheezing, chills or hemoptysis; No shortness of breath  CARDIOVASCULAR: No chest pain, palpitations, dizziness, or leg swelling  GASTROINTESTINAL: No abdominal or epigastric pain. No nausea, vomiting, or hematemesis; No diarrhea or constipation. No melena or hematochezia.  GENITOURINARY: No dysuria, frequency, hematuria, or incontinence  MUSCULOSKELETAL: No joint pain or swelling; No muscle, back, or extremity pain  Neuro: right sided weakness       ALL ROS REVIEWED AND NORMAL EXCEPT AS STATED ABOVE    Vital Signs Last 24 Hrs  T(C): 36.7 (26 Jul 2018 09:47), Max: 36.7 (25 Jul 2018 22:12)  T(F): 98.1 (26 Jul 2018 09:47), Max: 98.1 (25 Jul 2018 22:12)  HR: 72 (26 Jul 2018 09:47) (68 - 83)  BP: 96/66 (26 Jul 2018 09:47) (96/66 - 115/75)  BP(mean): --  RR: 14 (26 Jul 2018 09:47) (14 - 18)  SpO2: 95% (26 Jul 2018 09:47) (93% - 97%)    PHYSICAL EXAM:  GENERAL: NAD, dysarthric   HEAD:  Atraumatic, Normocephalic  NERVOUS SYSTEM:  Alert & Oriented X3  CHEST/LUNG: Clear lungs bilaterally; No rales, rhonchi, wheezing, or rubs  HEART: S1S2, RRR   ABDOMEN: Soft, Nontender, Nondistended; + Bowel sounds  EXTREMITIES:  2+ Peripheral Pulses, No clubbing, cyanosis, or edema        LABS:                        16.8   9.2   )-----------( 251      ( 26 Jul 2018 05:30 )             50.0     07-26    137  |  102  |  17  ----------------------------<  74  3.9   |  27  |  0.85    Ca    9.6      26 Jul 2018 05:30    TPro  7.9  /  Alb  3.3  /  TBili  1.1  /  DBili  x   /  AST  76<H>  /  ALT  97<H>  /  AlkPhos  118  07-26         CAPILLARY BLOOD GLUCOSE                RADIOLOGY & ADDITIONAL TESTS:    Consultant(s) Notes Reviewed:  [x ] YES  [ ] NO  Care Discussed with Consultants/Other Providers [ x] YES  [ ] NO  Imaging Personally Reviewed:  [ ] YES  [ ] NO

## 2018-07-26 NOTE — DIETITIAN INITIAL EVALUATION ADULT. - ADHERENCE
good/Pt was receiving mechanical soft c thin liquids PTA as per last swallow evaluation recommendations noted from 7/19 at [place]

## 2018-07-26 NOTE — DIETITIAN INITIAL EVALUATION ADULT. - OTHER INFO
Pt is a 56 y/o F with h/o breast cancer s/p lumpectomy presented with sudden onset right sided weakness, slurred speech found to have acute left thalamic CVA now with gait instability, ADL and functional impairments. Pt seen for MD consult for assessment. Pt states that she was tolerating mechanical soft diet with thin liquids. Noted pending swallow evaluation in-house for consistency recommendations. Pt reports NKFA, but states that she avoids chili and salmon. Pt states that she is 5'4" and her UBW is about 220lbs indicating 14lbs weight loss since last week per pt. Pt states that her appetite was poor PTA at [facility]. Pt denies use of any supplements PTA. Emphasized importance of meeting nutritional needs in order to maintain strength for recovery. Pt agreeable to trying Ensure Enlive (chocolate). Menu/contact information provided.

## 2018-07-26 NOTE — DIETITIAN INITIAL EVALUATION ADULT. - ORAL INTAKE PTA
poor/pt reports poor/fair po intake at [facility] PTA 2/2 to diet consistency and feeling sick

## 2018-07-26 NOTE — DIETITIAN INITIAL EVALUATION ADULT. - ENERGY NEEDS
Height: 5'4" (stated), Weight (7/25) 206.3lbs  Skin: no pressure ulcers, Edema: none per flow sheets  Last BM: 7/25 per pt   IBW range: 108-132lbs

## 2018-07-27 PROCEDURE — 99232 SBSQ HOSP IP/OBS MODERATE 35: CPT

## 2018-07-27 RX ADMIN — ESCITALOPRAM OXALATE 10 MILLIGRAM(S): 10 TABLET, FILM COATED ORAL at 21:39

## 2018-07-27 RX ADMIN — Medication 81 MILLIGRAM(S): at 11:53

## 2018-07-27 RX ADMIN — NYSTATIN CREAM 1 APPLICATION(S): 100000 CREAM TOPICAL at 05:05

## 2018-07-27 RX ADMIN — Medication 1 APPLICATION(S): at 11:54

## 2018-07-27 RX ADMIN — NYSTATIN CREAM 1 APPLICATION(S): 100000 CREAM TOPICAL at 17:14

## 2018-07-27 RX ADMIN — ATORVASTATIN CALCIUM 80 MILLIGRAM(S): 80 TABLET, FILM COATED ORAL at 21:39

## 2018-07-27 RX ADMIN — PANTOPRAZOLE SODIUM 40 MILLIGRAM(S): 20 TABLET, DELAYED RELEASE ORAL at 07:43

## 2018-07-27 RX ADMIN — ENOXAPARIN SODIUM 40 MILLIGRAM(S): 100 INJECTION SUBCUTANEOUS at 14:54

## 2018-07-27 RX ADMIN — Medication 1 TABLET(S): at 11:53

## 2018-07-27 NOTE — PROGRESS NOTE ADULT - SUBJECTIVE AND OBJECTIVE BOX
HPI:  Ms. Naila Banegas is a 57 year old female with PMH of left breast CA s/p 6-week course of radiation, lumpectomy, depression, former smoker who presented to [facility] ED c/o sudden onset right upper and lower extremity weakness, slurred speech and facial droop which had started the evening prior to presentation. Patient initially went to sleep after onset of symptoms hoping they would resolve, but awoke to find they had worsened.  Upon presentation NIHSS = 5, CT head showed acute/subacute left thalamic infarct, outside of tPA window. MRI Head shows left thalamic infarct with extension to midbrain without hemorrhage. Carotid doppler showed no significant stenosis.  TTE showed normal LVEF, mild diastolic (stage I) dysfunction, without evidence of structural abnormality or thrombus.  Hypercoagulable workup was unrevealing.  Etiology of stroke thought cardioembolic. Recommended by cardiology to have ILR placed as outpatient to rule out occult arrhythmia.  Failed bedside swallow evaluation and placed on mechanical soft with thins.  Deemed medically stable for acute rehab on 7/25/18.    Upon evaluation patient states feels residual right sided weakness.  Endorses double vision with both eyes open which improves by closing left eye.  She wore glasses prior to stroke which improved vision but now vision is worse.  Denies headache, palpitations, numbness/tingling, bowel/bladder incontinence.  She is hungry and requesting something to eat. (25 Jul 2018 15:06)      PAST MEDICAL & SURGICAL HISTORY:  Breast cancer: s/p Lumpectomy  No significant past surgical history    Subjective: No issues overnight.  Slept well.  Denies headache. Diplopia is stable.  Tolerating therapy.  Requesting food.      REVIEW OF SYMPTOMS  [X] Constitutional WNL     [X] Cardio WNL            [X] Resp WNL           [X] GI WNL                      [X]  WNL                 [X] Heme WNL              [X] Endo WNL                  [X] Skin--Psoarisis              [X] MSK--        [X] Neuro--right sensory deficit, diplopia                  [X] Cognitive WNL        [X] Psych-anxiety    Vital Signs Last 24 Hrs  T(C): 36.8 (27 Jul 2018 09:09), Max: 36.8 (27 Jul 2018 09:09)  T(F): 98.3 (27 Jul 2018 09:09), Max: 98.3 (27 Jul 2018 09:09)  HR: 76 (27 Jul 2018 09:09) (76 - 81)  BP: 94/62 (27 Jul 2018 09:09) (94/62 - 99/64)  BP(mean): --  RR: 14 (27 Jul 2018 09:09) (14 - 14)  SpO2: 95% (27 Jul 2018 09:09) (95% - 99%)      PHYSICAL EXAM  Gen - NAD, Comfortable  	HEENT - NCAT, Left eye with impaired lateral movement  	Neck - Supple, No limited ROM  	Pulm - CTAB, No wheeze, No rhonchi, No crackles  	Cardiovascular - RRR, S1S2, No murmurs  	Abdomen - Soft, NT/ND, +BS  	Extremities - No C/C/E, No calf tenderness  	Neuro-  	   Cognitive - AAOx3  	   Communication - Fluent, Dysarthria  	   Attention: Intact   	   Judgement: Good evidence of judgement  	   Memory: Recall 3 objects immediate and 3 min later      	   Cranial Nerves - Left CN VI (impaired lateral gaze) disconjugate gaze,  Impaired in upper visual field,  diplopia when both eyes open,    	   Motor - No focal deficits  	                  LEFT    UE - ShAB 5/5, EF 5/5, EE 5/5, WE 5/5,  5/5  	                  RIGHT UE - ShAB 5/5, EF 5/5, EE 5/5, WE 5/5,  5/5  	                  LEFT    LE - HF 5/5, KE 5/5, DF 5/5, PF 5/5  	                  RIGHT LE - HF 4+/5, KE 4+/5, DF 5/5, PF 5/5     	   Sensory - Impaired to light touch on right arm/leg   	   Reflexes - DTR Intact,  	   Coordination - FTN impaired with dysmetria b/l, HTS intact  	   Tone - normal  	Psychiatric - Mood stable, Affect WNL  	Skin:  mild groin rash bilaterally, psoriatic plaques bilateral medial malleoli   FUNCTIONAL PROGRESS  Gait - EVAL  ADLs - EVAL   Transfers - EVAL  Functional transfer - EVAL    RECENT LABS                        16.8   9.2   )-----------( 251      ( 26 Jul 2018 05:30 )             50.0     07-26    137  |  102  |  17  ----------------------------<  74  3.9   |  27  |  0.85    Ca    9.6      26 Jul 2018 05:30    TPro  7.9  /  Alb  3.3  /  TBili  1.1  /  DBili  x   /  AST  76<H>  /  ALT  97<H>  /  AlkPhos  118  07-26        RADIOLOGY/OTHER RESULTS      MEDICATIONS  (STANDING):  aspirin  chewable 81 milliGRAM(s) Oral daily  atorvastatin 80 milliGRAM(s) Oral at bedtime  enoxaparin Injectable 40 milliGRAM(s) SubCutaneous every 24 hours  escitalopram 10 milliGRAM(s) Oral at bedtime  hydrocortisone 1% Cream 1 Application(s) Topical daily  multivitamin 1 Tablet(s) Oral daily  nystatin Powder 1 Application(s) Topical two times a day  pantoprazole    Tablet 40 milliGRAM(s) Oral before breakfast    MEDICATIONS  (PRN):  acetaminophen   Tablet 650 milliGRAM(s) Oral every 6 hours PRN For Temp greater than 38 C (100.4 F)  acetaminophen   Tablet. 650 milliGRAM(s) Oral every 6 hours PRN Mild Pain (1 - 3)  diphenhydrAMINE   Capsule 25 milliGRAM(s) Oral every 4 hours PRN Rash and/or Itching  LORazepam     Tablet 0.5 milliGRAM(s) Oral every 6 hours PRN Anxiety

## 2018-07-27 NOTE — PROGRESS NOTE ADULT - ASSESSMENT
56 y/o F with h/o breast cancer s/p lumpectomy presented with sudden onset right sided weakness, slurred speech found to have acute left thalamic CVA now with gait instability, ADL and functional impairments.       #Left thalamic CVA  - start Comprehensive Rehab Program of PT/OT   - ASA, Statin, monitor BP  - ILR as outpatient to r/o occult arrhythmia     #Diplopia--Left abducens nerve palsy  - Vision assessment and therapy in OT--May use eye patch during therapies for symptom control but avoid using eye patch throughout entire day.  - Will need neuro optometry assessment as outpatient.     #Dysphagia/dysarthria  - Mechanical soft w/ thins  - SLP evaluation and treatment   - Aspiration precautions     #Anxiety/depression   - Lexapro, Ativan PRN     #Precautions / PROPHYLAXIS:   - Falls, Cardiac, Aspiration   - Pressure injury/Skin: Turn Q2hrs while in bed   - DVT: Lovenox    #Diet: Mechanical soft w/thin liquids DASH/TLC]

## 2018-07-28 PROCEDURE — 99232 SBSQ HOSP IP/OBS MODERATE 35: CPT

## 2018-07-28 RX ADMIN — PANTOPRAZOLE SODIUM 40 MILLIGRAM(S): 20 TABLET, DELAYED RELEASE ORAL at 06:01

## 2018-07-28 RX ADMIN — NYSTATIN CREAM 1 APPLICATION(S): 100000 CREAM TOPICAL at 19:07

## 2018-07-28 RX ADMIN — Medication 81 MILLIGRAM(S): at 13:53

## 2018-07-28 RX ADMIN — ATORVASTATIN CALCIUM 80 MILLIGRAM(S): 80 TABLET, FILM COATED ORAL at 21:15

## 2018-07-28 RX ADMIN — ENOXAPARIN SODIUM 40 MILLIGRAM(S): 100 INJECTION SUBCUTANEOUS at 13:55

## 2018-07-28 RX ADMIN — Medication 1 APPLICATION(S): at 13:54

## 2018-07-28 RX ADMIN — ESCITALOPRAM OXALATE 10 MILLIGRAM(S): 10 TABLET, FILM COATED ORAL at 21:15

## 2018-07-28 RX ADMIN — Medication 1 TABLET(S): at 13:54

## 2018-07-28 RX ADMIN — NYSTATIN CREAM 1 APPLICATION(S): 100000 CREAM TOPICAL at 06:01

## 2018-07-28 NOTE — PROGRESS NOTE ADULT - ASSESSMENT
56 y/o F with h/o breast cancer s/p lumpectomy presented with sudden onset right sided weakness, slurred speech found to have acute left thalamic CVA now with gait instability, ADL and functional impairments.       #Left thalamic CVA  - start Comprehensive Rehab Program of PT/OT   - ASA, Statin, monitor BP  - ILR as outpatient to r/o occult arrhythmia     #Diplopia--Left abducens nerve palsy  - Vision assessment and therapy in OT--May use eye patch during therapies for symptom control but avoid using eye patch throughout entire day.  - Will need neuro optometry assessment as outpatient.     #Dysphagia/dysarthria  - Mechanical soft w/ thins  - SLP evaluation and treatment   - Aspiration precautions     #Anxiety/depression   - Lexapro, Ativan PRN     #Precautions / PROPHYLAXIS:   - Falls, Cardiac, Aspiration   - Pressure injury/Skin: Turn Q2hrs while in bed   - DVT: Lovenox    #Diet: Mechanical soft w/thin liquids DASH/TLC] 56 y/o F with h/o breast cancer s/p lumpectomy presented with sudden onset right sided weakness, slurred speech found to have acute left thalamic CVA now with gait instability, ADL and functional impairments.       #Left thalamic CVA  - Comprehensive Rehab Program of PT/OT   - ASA, Statin, monitor BP  - ILR as outpatient to r/o occult arrhythmia   -Hospitalist cary (P)    #Diplopia--Left abducens nerve palsy  - Vision assessment and therapy in OT--May use eye patch during therapies for symptom control but avoid using eye patch throughout entire day.  - Will need neuro optometry assessment as outpatient.     #Dysphagia/dysarthria  - Mechanical soft w/ thins  - SLP evaluation and treatment   - Aspiration precautions     #Anxiety/depression   - Lexapro, Ativan PRN     #Precautions / PROPHYLAXIS:   - Falls, Cardiac, Aspiration   - Pressure injury/Skin: Turn Q2hrs while in bed   - DVT: Lovenox    #Diet: Mechanical soft w/thin liquids DASH/TLC]

## 2018-07-28 NOTE — PROGRESS NOTE ADULT - SUBJECTIVE AND OBJECTIVE BOX
HPI:  Ms. Naila Banegas is a 57 year old female with PMH of left breast CA s/p 6-week course of radiation, lumpectomy, depression, former smoker who presented to [facility] ED c/o sudden onset right upper and lower extremity weakness, slurred speech and facial droop which had started the evening prior to presentation. Patient initially went to sleep after onset of symptoms hoping they would resolve, but awoke to find they had worsened.  Upon presentation NIHSS = 5, CT head showed acute/subacute left thalamic infarct, outside of tPA window. MRI Head shows left thalamic infarct with extension to midbrain without hemorrhage. Carotid doppler showed no significant stenosis.  TTE showed normal LVEF, mild diastolic (stage I) dysfunction, without evidence of structural abnormality or thrombus.  Hypercoagulable workup was unrevealing.  Etiology of stroke thought cardioembolic. Recommended by cardiology to have ILR placed as outpatient to rule out occult arrhythmia.  Failed bedside swallow evaluation and placed on mechanical soft with thins.  Deemed medically stable for acute rehab on 7/25/18.    Upon evaluation patient states feels residual right sided weakness.  Endorses double vision with both eyes open which improves by closing left eye.  She wore glasses prior to stroke which improved vision but now vision is worse.  Denies headache, palpitations, numbness/tingling, bowel/bladder incontinence.  She is hungry and requesting something to eat. (25 Jul 2018 15:06)      PAST MEDICAL & SURGICAL HISTORY:  Breast cancer: s/p Lumpectomy  No significant past surgical history    Subjective: No issues overnight.  Slept well.  Denies headache. Diplopia is stable.  Tolerating therapy.  Requesting food.      REVIEW OF SYMPTOMS  [X] Constitutional WNL     [X] Cardio WNL            [X] Resp WNL           [X] GI WNL                      [X]  WNL                 [] Heme WNL              [] Endo WNL                  [X] Skin--Psoarisis              [X] MSK--        [X] Neuro--right sensory deficit, diplopia                  [X] Cognitive WNL       Vital Signs Last 24 Hrs  T(C): 36.8 (27 Jul 2018 09:09), Max: 36.8 (27 Jul 2018 09:09)  T(F): 98.3 (27 Jul 2018 09:09), Max: 98.3 (27 Jul 2018 09:09)  HR: 76 (27 Jul 2018 09:09) (76 - 81)  BP: 94/62 (27 Jul 2018 09:09) (94/62 - 99/64)  BP(mean): --  RR: 14 (27 Jul 2018 09:09) (14 - 14)  SpO2: 95% (27 Jul 2018 09:09) (95% - 99%)      PHYSICAL EXAM  Gen - NAD, Comfortable  	HEENT - NCAT,   	Neck - Supple, No limited ROM  	Pulm - CTAB, No wheeze, No rhonchi, No crackles  	Cardiovascular - RRR, S1S2, No murmurs  	Abdomen - Soft, NT/ND, +BS  	Extremities - No C/C/E, No calf tenderness  	Neuro-  	   Cognitive - AAOx3  	   Communication -  Dysarthria  	   Attention: Intact   	   Judgement: Good evidence of judgement   	   Cranial Nerves - Left CN VI (impaired lateral gaze) disconjugate gaze,  Impaired in upper visual field,  diplopia when both eyes open,    	   Motor - No focal deficits  	                  LEFT    UE - ShAB 5/5, EF 5/5, EE 5/5, WE 5/5,  5/5  	                  RIGHT UE - ShAB 5/5, EF 5/5, EE 5/5, WE 5/5,  5/5  	                  LEFT    LE - HF 5/5, KE 5/5, DF 5/5, PF 5/5  	                  RIGHT LE - HF 4+/5, KE 4+/5, DF 5/5, PF 5/5     	   Sensory - Impaired to light touch on right arm/leg   	   Reflexes - DTR Intact,  	Psychiatric - Mood stable, Affect WNL  	Skin:  psoriatic plaques bilateral medial malleoli     RECENT LABS                        16.8   9.2   )-----------( 251      ( 26 Jul 2018 05:30 )             50.0     07-26    137  |  102  |  17  ----------------------------<  74  3.9   |  27  |  0.85    Ca    9.6      26 Jul 2018 05:30    TPro  7.9  /  Alb  3.3  /  TBili  1.1  /  DBili  x   /  AST  76<H>  /  ALT  97<H>  /  AlkPhos  118  07-26        RADIOLOGY/OTHER RESULTS      MEDICATIONS  (STANDING):  aspirin  chewable 81 milliGRAM(s) Oral daily  atorvastatin 80 milliGRAM(s) Oral at bedtime  enoxaparin Injectable 40 milliGRAM(s) SubCutaneous every 24 hours  escitalopram 10 milliGRAM(s) Oral at bedtime  hydrocortisone 1% Cream 1 Application(s) Topical daily  multivitamin 1 Tablet(s) Oral daily  nystatin Powder 1 Application(s) Topical two times a day  pantoprazole    Tablet 40 milliGRAM(s) Oral before breakfast    MEDICATIONS  (PRN):  acetaminophen   Tablet 650 milliGRAM(s) Oral every 6 hours PRN For Temp greater than 38 C (100.4 F)  acetaminophen   Tablet. 650 milliGRAM(s) Oral every 6 hours PRN Mild Pain (1 - 3)  diphenhydrAMINE   Capsule 25 milliGRAM(s) Oral every 4 hours PRN Rash and/or Itching  LORazepam     Tablet 0.5 milliGRAM(s) Oral every 6 hours PRN Anxiety HPI:  Ms. Naila Banegas is a 57 year old female with PMH of left breast CA s/p 6-week course of radiation, lumpectomy, depression, former smoker who presented to [facility] ED c/o sudden onset right upper and lower extremity weakness, slurred speech and facial droop which had started the evening prior to presentation. Patient initially went to sleep after onset of symptoms hoping they would resolve, but awoke to find they had worsened.  Upon presentation NIHSS = 5, CT head showed acute/subacute left thalamic infarct, outside of tPA window. MRI Head shows left thalamic infarct with extension to midbrain without hemorrhage. Carotid doppler showed no significant stenosis.  TTE showed normal LVEF, mild diastolic (stage I) dysfunction, without evidence of structural abnormality or thrombus.  Hypercoagulable workup was unrevealing.  Etiology of stroke thought cardioembolic. Recommended by cardiology to have ILR placed as outpatient to rule out occult arrhythmia.  Failed bedside swallow evaluation and placed on mechanical soft with thins.  Deemed medically stable for acute rehab on 7/25/18.    Upon evaluation patient states feels residual right sided weakness.  Endorses double vision with both eyes open which improves by closing left eye.  She wore glasses prior to stroke which improved vision but now vision is worse.  Denies headache, palpitations, numbness/tingling, bowel/bladder incontinence.  She is hungry and requesting something to eat. (25 Jul 2018 15:06)      PAST MEDICAL & SURGICAL HISTORY:  Breast cancer: s/p Lumpectomy  No significant past surgical history    Subjective: No issues overnight.  Slept well.  Denies headache. Diplopia is stable.      REVIEW OF SYMPTOMS  [X] Constitutional WNL     [X] Cardio WNL            [X] Resp WNL           [X] GI WNL                      [X]  WNL                 [] Heme WNL              [] Endo WNL                  [X] Skin--Psoarisis              [X] MSK--        [X] Neuro--right sensory deficit, diplopia                  [X] Cognitive WNL       Vital Signs Last 24 Hrs  T(C): 36.8 (27 Jul 2018 09:09), Max: 36.8 (27 Jul 2018 09:09)  T(F): 98.3 (27 Jul 2018 09:09), Max: 98.3 (27 Jul 2018 09:09)  HR: 76 (27 Jul 2018 09:09) (76 - 81)  BP: 94/62 (27 Jul 2018 09:09) (94/62 - 99/64)  BP(mean): --  RR: 14 (27 Jul 2018 09:09) (14 - 14)  SpO2: 95% (27 Jul 2018 09:09) (95% - 99%)      PHYSICAL EXAM  Gen - NAD, Comfortable  	HEENT - NCAT,   	Neck - Supple, No limited ROM  	Pulm - CTAB, No wheeze, No rhonchi, No crackles  	Cardiovascular - RRR, S1S2, No murmurs  	Abdomen - Soft, NT/ND, +BS  	Extremities - No C/C/E, No calf tenderness  	Neuro-  	   Cognitive - AAOx3  	   Communication -  Dysarthria  	   Attention: Intact   	   Judgement: Good evidence of judgement   	   Cranial Nerves - Left CN VI (impaired lateral gaze) disconjugate gaze,  Impaired in upper visual field,  diplopia when both eyes open,    	   Motor - No focal deficits  	                  LEFT    UE - ShAB 5/5, EF 5/5, EE 5/5, WE 5/5,  5/5  	                  RIGHT UE - ShAB 5/5, EF 5/5, EE 5/5, WE 5/5,  5/5  	                  LEFT    LE - HF 5/5, KE 5/5, DF 5/5, PF 5/5  	                  RIGHT LE - HF 4+/5, KE 4+/5, DF 5/5, PF 5/5     	   Sensory - Impaired to light touch on right arm/leg   	   Reflexes - DTR Intact,  	Psychiatric - Mood stable, Affect WNL  	Skin:  psoriatic plaques bilateral medial malleoli     RECENT LABS                        16.8   9.2   )-----------( 251      ( 26 Jul 2018 05:30 )             50.0     07-26    137  |  102  |  17  ----------------------------<  74  3.9   |  27  |  0.85    Ca    9.6      26 Jul 2018 05:30    TPro  7.9  /  Alb  3.3  /  TBili  1.1  /  DBili  x   /  AST  76<H>  /  ALT  97<H>  /  AlkPhos  118  07-26        RADIOLOGY/OTHER RESULTS      MEDICATIONS  (STANDING):  aspirin  chewable 81 milliGRAM(s) Oral daily  atorvastatin 80 milliGRAM(s) Oral at bedtime  enoxaparin Injectable 40 milliGRAM(s) SubCutaneous every 24 hours  escitalopram 10 milliGRAM(s) Oral at bedtime  hydrocortisone 1% Cream 1 Application(s) Topical daily  multivitamin 1 Tablet(s) Oral daily  nystatin Powder 1 Application(s) Topical two times a day  pantoprazole    Tablet 40 milliGRAM(s) Oral before breakfast    MEDICATIONS  (PRN):  acetaminophen   Tablet 650 milliGRAM(s) Oral every 6 hours PRN For Temp greater than 38 C (100.4 F)  acetaminophen   Tablet. 650 milliGRAM(s) Oral every 6 hours PRN Mild Pain (1 - 3)  diphenhydrAMINE   Capsule 25 milliGRAM(s) Oral every 4 hours PRN Rash and/or Itching  LORazepam     Tablet 0.5 milliGRAM(s) Oral every 6 hours PRN Anxiety

## 2018-07-29 PROCEDURE — 99232 SBSQ HOSP IP/OBS MODERATE 35: CPT

## 2018-07-29 RX ADMIN — Medication 1 TABLET(S): at 12:31

## 2018-07-29 RX ADMIN — ESCITALOPRAM OXALATE 10 MILLIGRAM(S): 10 TABLET, FILM COATED ORAL at 21:00

## 2018-07-29 RX ADMIN — PANTOPRAZOLE SODIUM 40 MILLIGRAM(S): 20 TABLET, DELAYED RELEASE ORAL at 08:03

## 2018-07-29 RX ADMIN — NYSTATIN CREAM 1 APPLICATION(S): 100000 CREAM TOPICAL at 05:50

## 2018-07-29 RX ADMIN — Medication 81 MILLIGRAM(S): at 12:31

## 2018-07-29 RX ADMIN — Medication 1 APPLICATION(S): at 12:31

## 2018-07-29 RX ADMIN — ENOXAPARIN SODIUM 40 MILLIGRAM(S): 100 INJECTION SUBCUTANEOUS at 13:51

## 2018-07-29 RX ADMIN — ATORVASTATIN CALCIUM 80 MILLIGRAM(S): 80 TABLET, FILM COATED ORAL at 21:00

## 2018-07-29 NOTE — PROGRESS NOTE ADULT - ASSESSMENT
56 y/o F with h/o breast cancer s/p lumpectomy presented with sudden onset right sided weakness, slurred speech found to have acute left thalamic CVA now with gait instability, ADL and functional impairments.       #Left thalamic CVA  - Comprehensive Rehab Program of PT/OT   - ASA, Statin, monitor BP  - ILR as outpatient to r/o occult arrhythmia   -labs Monday  -Hospitalist cary (P)    #Diplopia--Left abducens nerve palsy  - Vision assessment and therapy in OT--May use eye patch during therapies for symptom control but avoid using eye patch throughout entire day.  - Will need neuro optometry assessment as outpatient.     #Dysphagia/dysarthria  - Mechanical soft w/ thins  - SLP evaluation and treatment   - Aspiration precautions     #Anxiety/depression   - Lexapro, Ativan PRN     #Precautions / PROPHYLAXIS:   - Falls, Cardiac, Aspiration   - Pressure injury/Skin: Turn Q2hrs while in bed   - DVT: Lovenox    #Diet: Mechanical soft w/thin liquids DASH/TLC]

## 2018-07-29 NOTE — PROGRESS NOTE ADULT - SUBJECTIVE AND OBJECTIVE BOX
HPI:  Ms. Naila Banegas is a 57 year old female with PMH of left breast CA s/p 6-week course of radiation, lumpectomy, depression, former smoker who presented to [facility] ED c/o sudden onset right upper and lower extremity weakness, slurred speech and facial droop which had started the evening prior to presentation. Patient initially went to sleep after onset of symptoms hoping they would resolve, but awoke to find they had worsened.  Upon presentation NIHSS = 5, CT head showed acute/subacute left thalamic infarct, outside of tPA window. MRI Head shows left thalamic infarct with extension to midbrain without hemorrhage. Carotid doppler showed no significant stenosis.  TTE showed normal LVEF, mild diastolic (stage I) dysfunction, without evidence of structural abnormality or thrombus.  Hypercoagulable workup was unrevealing.  Etiology of stroke thought cardioembolic. Recommended by cardiology to have ILR placed as outpatient to rule out occult arrhythmia.  Failed bedside swallow evaluation and placed on mechanical soft with thins.  Deemed medically stable for acute rehab on 7/25/18.    Upon evaluation patient states feels residual right sided weakness.  Endorses double vision with both eyes open which improves by closing left eye.  She wore glasses prior to stroke which improved vision but now vision is worse.  Denies headache, palpitations, numbness/tingling, bowel/bladder incontinence.  She is hungry and requesting something to eat. (25 Jul 2018 15:06)      PAST MEDICAL & SURGICAL HISTORY:  Breast cancer: s/p Lumpectomy  No significant past surgical history    Subjective: No issues overnight.  Slept well.  Denies headache. Diplopia is stable.  No SOB, no pain.    REVIEW OF SYMPTOMS  [X] Constitutional WNL     [X] Cardio WNL            [X] Resp WNL           [X] GI WNL                      [X]  WNL                 [] Heme WNL              [] Endo WNL                  [X] Skin--Psoarisis              [X] MSK--        [X] Neuro--right sensory deficit, diplopia                  [X] Cognitive WNL       Vital Signs Last 24 Hrs  T(C): 36.8 (27 Jul 2018 09:09), Max: 36.8 (27 Jul 2018 09:09)  T(F): 98.3 (27 Jul 2018 09:09), Max: 98.3 (27 Jul 2018 09:09)  HR: 76 (27 Jul 2018 09:09) (76 - 81)  BP: 94/62 (27 Jul 2018 09:09) (94/62 - 99/64)  BP(mean): --  RR: 14 (27 Jul 2018 09:09) (14 - 14)  SpO2: 95% (27 Jul 2018 09:09) (95% - 99%)      PHYSICAL EXAM  Gen - NAD, Comfortable  	HEENT - NCAT,   	Neck - Supple, No limited ROM  	Pulm - CTAB, No wheeze, No rhonchi, No crackles  	Cardiovascular - RRR, S1S2, No murmurs  	Abdomen - Soft, NT/ND, +BS  	Extremities - No C/C/E, No calf tenderness  	Neuro-  	   Cognitive - AAOx3  	   Communication -  Dysarthria  	   Attention: Intact   	   Judgement: Good evidence of judgement   	   Cranial Nerves - Left CN VI (impaired lateral gaze) disconjugate gaze,  Impaired in upper visual field,  diplopia when both eyes open,    	   Motor - No focal deficits  	                  LEFT    UE - ShAB 5/5, EF 5/5, EE 5/5, WE 5/5,  5/5  	                  RIGHT UE - ShAB 5/5, EF 5/5, EE 5/5, WE 5/5,  5/5  	                  LEFT    LE - HF 5/5, KE 5/5, DF 5/5, PF 5/5  	                  RIGHT LE - HF 4+/5, KE 4+/5, DF 5/5, PF 5/5     	   Sensory - Impaired to light touch on right arm/leg   	   Reflexes - DTR Intact,  	Psychiatric - Mood stable, Affect WNL  	Skin:  psoriatic plaques bilateral medial malleoli     RECENT LABS                        16.8   9.2   )-----------( 251      ( 26 Jul 2018 05:30 )             50.0     07-26    137  |  102  |  17  ----------------------------<  74  3.9   |  27  |  0.85    Ca    9.6      26 Jul 2018 05:30    TPro  7.9  /  Alb  3.3  /  TBili  1.1  /  DBili  x   /  AST  76<H>  /  ALT  97<H>  /  AlkPhos  118  07-26        RADIOLOGY/OTHER RESULTS      MEDICATIONS  (STANDING):  aspirin  chewable 81 milliGRAM(s) Oral daily  atorvastatin 80 milliGRAM(s) Oral at bedtime  enoxaparin Injectable 40 milliGRAM(s) SubCutaneous every 24 hours  escitalopram 10 milliGRAM(s) Oral at bedtime  hydrocortisone 1% Cream 1 Application(s) Topical daily  multivitamin 1 Tablet(s) Oral daily  nystatin Powder 1 Application(s) Topical two times a day  pantoprazole    Tablet 40 milliGRAM(s) Oral before breakfast    MEDICATIONS  (PRN):  acetaminophen   Tablet 650 milliGRAM(s) Oral every 6 hours PRN For Temp greater than 38 C (100.4 F)  acetaminophen   Tablet. 650 milliGRAM(s) Oral every 6 hours PRN Mild Pain (1 - 3)  diphenhydrAMINE   Capsule 25 milliGRAM(s) Oral every 4 hours PRN Rash and/or Itching  LORazepam     Tablet 0.5 milliGRAM(s) Oral every 6 hours PRN Anxiety

## 2018-07-30 LAB
ANION GAP SERPL CALC-SCNC: 9 MMOL/L — SIGNIFICANT CHANGE UP (ref 5–17)
BUN SERPL-MCNC: 16 MG/DL — SIGNIFICANT CHANGE UP (ref 7–23)
CALCIUM SERPL-MCNC: 9.7 MG/DL — SIGNIFICANT CHANGE UP (ref 8.4–10.5)
CHLORIDE SERPL-SCNC: 107 MMOL/L — SIGNIFICANT CHANGE UP (ref 96–108)
CO2 SERPL-SCNC: 28 MMOL/L — SIGNIFICANT CHANGE UP (ref 22–31)
CREAT SERPL-MCNC: 0.95 MG/DL — SIGNIFICANT CHANGE UP (ref 0.5–1.3)
GLUCOSE SERPL-MCNC: 85 MG/DL — SIGNIFICANT CHANGE UP (ref 70–99)
HCT VFR BLD CALC: 47.8 % — HIGH (ref 34.5–45)
HGB BLD-MCNC: 16.3 G/DL — HIGH (ref 11.5–15.5)
MCHC RBC-ENTMCNC: 29.4 PG — SIGNIFICANT CHANGE UP (ref 27–34)
MCHC RBC-ENTMCNC: 34 GM/DL — SIGNIFICANT CHANGE UP (ref 32–36)
MCV RBC AUTO: 86.4 FL — SIGNIFICANT CHANGE UP (ref 80–100)
PLATELET # BLD AUTO: 249 K/UL — SIGNIFICANT CHANGE UP (ref 150–400)
POTASSIUM SERPL-MCNC: 4 MMOL/L — SIGNIFICANT CHANGE UP (ref 3.5–5.3)
POTASSIUM SERPL-SCNC: 4 MMOL/L — SIGNIFICANT CHANGE UP (ref 3.5–5.3)
RBC # BLD: 5.53 M/UL — HIGH (ref 3.8–5.2)
RBC # FLD: 12.4 % — SIGNIFICANT CHANGE UP (ref 10.3–14.5)
SODIUM SERPL-SCNC: 144 MMOL/L — SIGNIFICANT CHANGE UP (ref 135–145)
WBC # BLD: 6.9 K/UL — SIGNIFICANT CHANGE UP (ref 3.8–10.5)
WBC # FLD AUTO: 6.9 K/UL — SIGNIFICANT CHANGE UP (ref 3.8–10.5)

## 2018-07-30 PROCEDURE — 99232 SBSQ HOSP IP/OBS MODERATE 35: CPT

## 2018-07-30 RX ORDER — DOCUSATE SODIUM 100 MG
100 CAPSULE ORAL THREE TIMES A DAY
Qty: 0 | Refills: 0 | Status: DISCONTINUED | OUTPATIENT
Start: 2018-07-30 | End: 2018-08-06

## 2018-07-30 RX ORDER — POLYETHYLENE GLYCOL 3350 17 G/17G
17 POWDER, FOR SOLUTION ORAL DAILY
Qty: 0 | Refills: 0 | Status: DISCONTINUED | OUTPATIENT
Start: 2018-07-30 | End: 2018-08-08

## 2018-07-30 RX ORDER — SENNA PLUS 8.6 MG/1
2 TABLET ORAL AT BEDTIME
Qty: 0 | Refills: 0 | Status: DISCONTINUED | OUTPATIENT
Start: 2018-07-30 | End: 2018-08-06

## 2018-07-30 RX ADMIN — PANTOPRAZOLE SODIUM 40 MILLIGRAM(S): 20 TABLET, DELAYED RELEASE ORAL at 06:50

## 2018-07-30 RX ADMIN — SENNA PLUS 2 TABLET(S): 8.6 TABLET ORAL at 21:03

## 2018-07-30 RX ADMIN — Medication 100 MILLIGRAM(S): at 13:39

## 2018-07-30 RX ADMIN — ENOXAPARIN SODIUM 40 MILLIGRAM(S): 100 INJECTION SUBCUTANEOUS at 13:39

## 2018-07-30 RX ADMIN — NYSTATIN CREAM 1 APPLICATION(S): 100000 CREAM TOPICAL at 05:15

## 2018-07-30 RX ADMIN — NYSTATIN CREAM 1 APPLICATION(S): 100000 CREAM TOPICAL at 17:47

## 2018-07-30 RX ADMIN — Medication 100 MILLIGRAM(S): at 21:03

## 2018-07-30 RX ADMIN — Medication 1 TABLET(S): at 11:45

## 2018-07-30 RX ADMIN — Medication 81 MILLIGRAM(S): at 11:44

## 2018-07-30 RX ADMIN — ATORVASTATIN CALCIUM 80 MILLIGRAM(S): 80 TABLET, FILM COATED ORAL at 21:03

## 2018-07-30 RX ADMIN — Medication 1 APPLICATION(S): at 11:45

## 2018-07-30 RX ADMIN — ESCITALOPRAM OXALATE 10 MILLIGRAM(S): 10 TABLET, FILM COATED ORAL at 21:03

## 2018-07-30 NOTE — PROGRESS NOTE ADULT - ASSESSMENT
58 y/o F with h/o breast cancer s/p lumpectomy presented with sudden onset right sided weakness, slurred speech found to have acute left thalamic CVA now with gait instability, ADL and functional impairments.       #Left thalamic CVA  - start Comprehensive Rehab Program of PT/OT   - ASA, Statin, monitor BP  - ILR as outpatient to r/o occult arrhythmia     #Diplopia--Left abducens nerve palsy  - Vision assessment and therapy in OT--May use eye patch during therapies for symptom control but avoid using eye patch throughout entire day.  - Will need neuro optometry assessment as outpatient.     #Dysphagia/dysarthria  - Mechanical soft w/ thins  - SLP evaluation and treatment   - Aspiration precautions     #Anxiety/depression   - Lexapro, Ativan PRN     #Precautions / PROPHYLAXIS:   - Falls, Cardiac, Aspiration   - Pressure injury/Skin: Turn Q2hrs while in bed   - DVT: Lovenox    #Diet: Mechanical soft w/thin liquids DASH/TLC]      Spoke with pt's  last week at length regarding rehab plan of care and discharge expectations.  He will provide supervision upon discharge and plans to stay here in [place] with his wife as there are limited services where they live in [facility]. All questions answered.

## 2018-07-30 NOTE — CHART NOTE - NSCHARTNOTEFT_GEN_A_CORE
Nutrition Follow Up:    Source: Patient [x]    Family [ ]     other [x]: medical chart reviewed    Diet : Mechanical Soft c thins, DASH/TLC + Ensure Enlive 8oz daily     HPI: Pt is a 56 y/o F with h/o breast cancer s/p lumpectomy presented with sudden onset right sided weakness, slurred speech found to have acute left thalamic CVA now with gait instability, ADL and functional impairments.    Pt tolerates current diet, but dislikes ground consistency which has been negatively affecting her po intake/appetite. Noted fluctuating po intake % per flow sheets. Noted SLP evaluation on admission recommended "soft, cut-up"- spoke with speech therapist to clarify if pt is eligible for diet upgrade. Pt seen in [facility]- states that she ate well at breakfast today- likes eggs. Reviewed today's menu with pt to optimize po intake. Pt states she dislikes Ensure and would like to discontinue- will add chocolate pudding TID instead. Encourage PO intake >75% at meals and reviewed importance of meeting nutritional needs for strength and recovery/importance of diet consistency modifications as per SLP recommendations for pt safety.      Current Weight:   (7/25) 206.3lbs  (7/29) 205.4lbs   % Weight Change    Pertinent Medications: lexapro, MVI, protonix    Pertinent Labs: (7/30) basic metabolic panel WNL    Skin: no pressure ulcers  Edema: none per flow sheets  Last BM: ??     Estimated Needs:   [x] no change since previous assessment  [ ] recalculated:       Previous Nutrition Diagnosis:  [x] Malnutrition (moderate) - diet consistency as per SLP, pt refusing Ensure Enlive, reviewing food preferences c pt       Nutrition Diagnosis is [x] ongoing  [ ] resolved [ ] not applicable        New Nutrition Diagnosis: [x] not applicable      Interventions:     1. Recommend discontinue Ensure Enlive daily as pt dislikes. Will add chocolate pudding TID to daily menu. Diet consistency as per SLP.   2. Continue to monitor weights 3x/week to assess for trends.  3. Encourage po intake >75% at meals    Monitoring and Evaluation:     [x] PO intake [x] Tolerance to diet prescription [x] weights [x] follow up per protocol    [x] other: SLP recommendations for diet consistency     RD to remain available   Chaoy Bingham RDN

## 2018-07-30 NOTE — PROGRESS NOTE ADULT - SUBJECTIVE AND OBJECTIVE BOX
HPI:  Ms. Naila Banegas is a 57 year old female with PMH of left breast CA s/p 6-week course of radiation, lumpectomy, depression, former smoker who presented to [facility] ED c/o sudden onset right upper and lower extremity weakness, slurred speech and facial droop which had started the evening prior to presentation. Patient initially went to sleep after onset of symptoms hoping they would resolve, but awoke to find they had worsened.  Upon presentation NIHSS = 5, CT head showed acute/subacute left thalamic infarct, outside of tPA window. MRI Head shows left thalamic infarct with extension to midbrain without hemorrhage. Carotid doppler showed no significant stenosis.  TTE showed normal LVEF, mild diastolic (stage I) dysfunction, without evidence of structural abnormality or thrombus.  Hypercoagulable workup was unrevealing.  Etiology of stroke thought cardioembolic. Recommended by cardiology to have ILR placed as outpatient to rule out occult arrhythmia.  Failed bedside swallow evaluation and placed on mechanical soft with thins.  Deemed medically stable for acute rehab on 7/25/18.    Upon evaluation patient states feels residual right sided weakness.  Endorses double vision with both eyes open which improves by closing left eye.  She wore glasses prior to stroke which improved vision but now vision is worse.  Denies headache, palpitations, numbness/tingling, bowel/bladder incontinence.  She is hungry and requesting something to eat. (25 Jul 2018 15:06)      PAST MEDICAL & SURGICAL HISTORY:  Breast cancer: s/p Lumpectomy  No significant past surgical history      Subjective:  No new complaints, diplopia slightly improved.     REVIEW OF SYMPTOMS  X] Constitutional WNL     [X] Cardio WNL            [X] Resp WNL           [X] GI WNL                      [X]  WNL                 [X] Heme WNL              [X] Endo WNL                  [X] Skin--Psoarisis              [X] MSK--        [X] Neuro--right sensory deficit, diplopia                  [X] Cognitive WNL        [X] Psych-anxiety      VITALS  Vital Signs Last 24 Hrs  T(C): 36.4 (30 Jul 2018 08:00), Max: 36.6 (29 Jul 2018 20:49)  T(F): 97.5 (30 Jul 2018 08:00), Max: 97.9 (29 Jul 2018 20:49)  HR: 74 (30 Jul 2018 08:00) (74 - 86)  BP: 110/66 (30 Jul 2018 08:00) (100/68 - 110/66)  BP(mean): --  RR: 16 (30 Jul 2018 08:00) (14 - 16)  SpO2: 95% (30 Jul 2018 08:00) (95% - 97%)      PHYSICAL EXAM  Gen - NAD, Comfortable  	HEENT - NCAT, Left eye with impaired lateral movement  	Neck - Supple, No limited ROM  	Pulm - CTAB, No wheeze, No rhonchi, No crackles  	Cardiovascular - RRR, S1S2, No murmurs  	Abdomen - Soft, NT/ND, +BS  	Extremities - No C/C/E, No calf tenderness  	Neuro-  	   Cognitive - AAOx3  	   Communication - Fluent, Dysarthria  	   Attention: Intact   	   Judgement: Good evidence of judgement  	   Memory: Recall 3 objects immediate and 3 min later      	   Cranial Nerves - Left CN VI (impaired lateral gaze) disconjugate gaze,  Impairment in upper visual field resolving,  diplopia when both eyes open, but getting better    	   Motor - No focal deficits  	                  LEFT    UE - ShAB 5/5, EF 5/5, EE 5/5, WE 5/5,  5/5  	                  RIGHT UE - ShAB 5/5, EF 5/5, EE 5/5, WE 5/5,  5/5  	                  LEFT    LE - HF 5/5, KE 5/5, DF 5/5, PF 5/5  	                  RIGHT LE - HF 4+/5, KE 4+/5, DF 5/5, PF 5/5     	   Sensory - Impaired to light touch on right arm/leg   	   Reflexes - DTR Intact,  	   Coordination - FTN impaired with dysmetria b/l, HTS intact  	   Tone - normal  	Psychiatric - Mood stable, Affect WNL  	Skin:  mild groin rash bilaterally, psoriatic plaques bilateral medial malleoli     RECENT LABS                        16.3   6.9   )-----------( 249      ( 30 Jul 2018 06:30 )             47.8     07-30    144  |  107  |  16  ----------------------------<  85  4.0   |  28  |  0.95    Ca    9.7      30 Jul 2018 06:30              RADIOLOGY/OTHER RESULTS      MEDICATIONS  (STANDING):  aspirin  chewable 81 milliGRAM(s) Oral daily  atorvastatin 80 milliGRAM(s) Oral at bedtime  docusate sodium 100 milliGRAM(s) Oral three times a day  enoxaparin Injectable 40 milliGRAM(s) SubCutaneous every 24 hours  escitalopram 10 milliGRAM(s) Oral at bedtime  hydrocortisone 1% Cream 1 Application(s) Topical daily  multivitamin 1 Tablet(s) Oral daily  nystatin Powder 1 Application(s) Topical two times a day  pantoprazole    Tablet 40 milliGRAM(s) Oral before breakfast  senna 2 Tablet(s) Oral at bedtime    MEDICATIONS  (PRN):  acetaminophen   Tablet 650 milliGRAM(s) Oral every 6 hours PRN For Temp greater than 38 C (100.4 F)  acetaminophen   Tablet. 650 milliGRAM(s) Oral every 6 hours PRN Mild Pain (1 - 3)  diphenhydrAMINE   Capsule 25 milliGRAM(s) Oral every 4 hours PRN Rash and/or Itching  LORazepam     Tablet 0.5 milliGRAM(s) Oral every 6 hours PRN Anxiety  polyethylene glycol 3350 17 Gram(s) Oral daily PRN Constipation

## 2018-07-31 PROCEDURE — 99232 SBSQ HOSP IP/OBS MODERATE 35: CPT

## 2018-07-31 RX ADMIN — POLYETHYLENE GLYCOL 3350 17 GRAM(S): 17 POWDER, FOR SOLUTION ORAL at 06:15

## 2018-07-31 RX ADMIN — NYSTATIN CREAM 1 APPLICATION(S): 100000 CREAM TOPICAL at 18:05

## 2018-07-31 RX ADMIN — Medication 100 MILLIGRAM(S): at 11:10

## 2018-07-31 RX ADMIN — NYSTATIN CREAM 1 APPLICATION(S): 100000 CREAM TOPICAL at 06:08

## 2018-07-31 RX ADMIN — Medication 81 MILLIGRAM(S): at 11:10

## 2018-07-31 RX ADMIN — Medication 100 MILLIGRAM(S): at 06:08

## 2018-07-31 RX ADMIN — ESCITALOPRAM OXALATE 10 MILLIGRAM(S): 10 TABLET, FILM COATED ORAL at 22:17

## 2018-07-31 RX ADMIN — SENNA PLUS 2 TABLET(S): 8.6 TABLET ORAL at 22:16

## 2018-07-31 RX ADMIN — Medication 100 MILLIGRAM(S): at 22:16

## 2018-07-31 RX ADMIN — Medication 1 TABLET(S): at 11:11

## 2018-07-31 RX ADMIN — ENOXAPARIN SODIUM 40 MILLIGRAM(S): 100 INJECTION SUBCUTANEOUS at 12:35

## 2018-07-31 RX ADMIN — PANTOPRAZOLE SODIUM 40 MILLIGRAM(S): 20 TABLET, DELAYED RELEASE ORAL at 06:09

## 2018-07-31 RX ADMIN — ATORVASTATIN CALCIUM 80 MILLIGRAM(S): 80 TABLET, FILM COATED ORAL at 22:17

## 2018-07-31 RX ADMIN — Medication 1 APPLICATION(S): at 11:12

## 2018-07-31 NOTE — PROGRESS NOTE ADULT - ASSESSMENT
56 y/o F with h/o breast cancer s/p lumpectomy presented with sudden onset right sided weakness, slurred speech found to have acute left thalamic CVA now with gait instability, ADL and functional impairments.       #Left thalamic CVA  - continue Rehab Program of PT/OT   - ASA, Statin, monitor BP  - ILR as outpatient to r/o occult arrhythmia     #Diplopia--Left abducens nerve palsy  - Vision assessment and therapy in OT--May use eye patch during therapies for symptom control but avoid using eye patch throughout entire day.  - Will need neuro optometry assessment as outpatient.     #Dysphagia/dysarthria  - Mechanical soft w/ thins  - SLP evaluation and treatment   - Aspiration precautions     #Constipation  - Colace, Senna, Miralax, add prune juice with meals and PRN suppository if ineffective     #Anxiety/depression   - Lexapro, Ativan PRN     #Precautions / PROPHYLAXIS:   - Falls, Cardiac, Aspiration   - Pressure injury/Skin: Turn Q2hrs while in bed   - DVT: Lovenox    #Diet: Mechanical soft w/thin liquids DASH/TLC]      #Dispo: discussed in IDT meeting 7/31 - currently set up for self care, contact guard/min assist transfers, CG min Assist ambulation performing 12 steps min assist; Barriers include poor vision, impaired judgment, mild cognitive deficits. DC home with outpatient PT/OT/SLP on 8/8/18. Family/spouse training prior to DC.   will provide supervision upon discharge and plans to stay here in [place] with his wife as there are limited services where they live in [facility].

## 2018-08-01 PROCEDURE — 99232 SBSQ HOSP IP/OBS MODERATE 35: CPT

## 2018-08-01 RX ADMIN — Medication 100 MILLIGRAM(S): at 17:59

## 2018-08-01 RX ADMIN — ENOXAPARIN SODIUM 40 MILLIGRAM(S): 100 INJECTION SUBCUTANEOUS at 14:28

## 2018-08-01 RX ADMIN — ATORVASTATIN CALCIUM 80 MILLIGRAM(S): 80 TABLET, FILM COATED ORAL at 21:29

## 2018-08-01 RX ADMIN — Medication 100 MILLIGRAM(S): at 21:29

## 2018-08-01 RX ADMIN — Medication 1 TABLET(S): at 11:29

## 2018-08-01 RX ADMIN — ESCITALOPRAM OXALATE 10 MILLIGRAM(S): 10 TABLET, FILM COATED ORAL at 21:29

## 2018-08-01 RX ADMIN — Medication 100 MILLIGRAM(S): at 05:31

## 2018-08-01 RX ADMIN — NYSTATIN CREAM 1 APPLICATION(S): 100000 CREAM TOPICAL at 17:59

## 2018-08-01 RX ADMIN — Medication 81 MILLIGRAM(S): at 11:29

## 2018-08-01 RX ADMIN — PANTOPRAZOLE SODIUM 40 MILLIGRAM(S): 20 TABLET, DELAYED RELEASE ORAL at 06:48

## 2018-08-01 RX ADMIN — SENNA PLUS 2 TABLET(S): 8.6 TABLET ORAL at 21:29

## 2018-08-01 RX ADMIN — Medication 1 APPLICATION(S): at 11:29

## 2018-08-01 NOTE — PROGRESS NOTE ADULT - SUBJECTIVE AND OBJECTIVE BOX
HPI:  Ms. Naila Banegas is a 57 year old female with PMH of left breast CA s/p 6-week course of radiation, lumpectomy, depression, former smoker who presented to [facility] ED c/o sudden onset right upper and lower extremity weakness, slurred speech and facial droop which had started the evening prior to presentation. Patient initially went to sleep after onset of symptoms hoping they would resolve, but awoke to find they had worsened.  Upon presentation NIHSS = 5, CT head showed acute/subacute left thalamic infarct, outside of tPA window. MRI Head shows left thalamic infarct with extension to midbrain without hemorrhage. Carotid doppler showed no significant stenosis.  TTE showed normal LVEF, mild diastolic (stage I) dysfunction, without evidence of structural abnormality or thrombus.  Hypercoagulable workup was unrevealing.  Etiology of stroke thought cardioembolic. Recommended by cardiology to have ILR placed as outpatient to rule out occult arrhythmia.  Failed bedside swallow evaluation and placed on mechanical soft with thins.  Deemed medically stable for acute rehab on 7/25/18.    Upon evaluation patient states feels residual right sided weakness.  Endorses double vision with both eyes open which improves by closing left eye.  She wore glasses prior to stroke which improved vision but now vision is worse.  Denies headache, palpitations, numbness/tingling, bowel/bladder incontinence.  She is hungry and requesting something to eat. (25 Jul 2018 15:06)      PAST MEDICAL & SURGICAL HISTORY:  Breast cancer: s/p Lumpectomy  No significant past surgical history      Subjective:  No new complaints. slept well, no pain.     REVIEW OF SYMPTOMS  X] Constitutional WNL     [X] Cardio WNL            [X] Resp WNL           [X] GI constipation                    [X]  WNL                 [X] Heme WNL              [X] Endo WNL                  [X] Skin--Psoarisis              [X] MSK--        [X] Neuro--right sensory deficit, diplopia                  [X] Cognitive WNL        [X] Psych-anxiety        VITALS  Vital Signs Last 24 Hrs  T(C): 36.4 (01 Aug 2018 08:37), Max: 36.6 (31 Jul 2018 22:19)  T(F): 97.6 (01 Aug 2018 08:37), Max: 97.8 (31 Jul 2018 22:19)  HR: 74 (01 Aug 2018 08:37) (70 - 74)  BP: 103/71 (01 Aug 2018 08:37) (100/63 - 103/71)  BP(mean): --  RR: 14 (01 Aug 2018 08:37) (14 - 14)  SpO2: 94% (01 Aug 2018 08:37) (93% - 94%)      PHYSICAL EXAM  Gen - NAD, Comfortable  	HEENT - NCAT, Left eye with impaired lateral movement  	Neck - Supple, No limited ROM  	Pulm - CTAB, No wheeze, No rhonchi, No crackles  	Cardiovascular - RRR, S1S2, No murmurs  	Abdomen - Soft, NT/ND, +BS  	Extremities - No C/C/E, No calf tenderness  	Neuro-  	   Cognitive - AAOx3  	   Communication - Fluent, Dysarthria  	   Attention: Intact   	   Judgement: Good evidence of judgement  	   Memory: Recall 3 objects immediate and 3 min later      	   Cranial Nerves - Left CN VI (impaired lateral gaze) disconjugate gaze,  Impairment in upper visual field resolving,  diplopia when both eyes open, but getting better    	   Motor - No focal deficits  	                  LEFT    UE - ShAB 5/5, EF 5/5, EE 5/5, WE 5/5,  5/5  	                  RIGHT UE - ShAB 5/5, EF 5/5, EE 5/5, WE 5/5,  5/5  	                  LEFT    LE - HF 5/5, KE 5/5, DF 5/5, PF 5/5  	                  RIGHT LE - HF 4+/5, KE 4+/5, DF 5/5, PF 5/5     	   Sensory - Impaired to light touch on right arm/leg   	   Reflexes - DTR Intact,  	   Coordination - FTN impaired with dysmetria b/l, HTS intact  	   Tone - normal  	Psychiatric - Mood stable, Affect WNL  	Skin:   psoriatic plaques bilateral medial malleoli \    FUNCTIONAL PROGRESS  Gait - amb RW CG/min A, 12 steps min A.  ADLs - Sup exc. CG transfers     RECENT LABS                  RADIOLOGY/OTHER RESULTS      MEDICATIONS  (STANDING):  aspirin  chewable 81 milliGRAM(s) Oral daily  atorvastatin 80 milliGRAM(s) Oral at bedtime  docusate sodium 100 milliGRAM(s) Oral three times a day  enoxaparin Injectable 40 milliGRAM(s) SubCutaneous every 24 hours  escitalopram 10 milliGRAM(s) Oral at bedtime  hydrocortisone 1% Cream 1 Application(s) Topical daily  multivitamin 1 Tablet(s) Oral daily  nystatin Powder 1 Application(s) Topical two times a day  pantoprazole    Tablet 40 milliGRAM(s) Oral before breakfast  senna 2 Tablet(s) Oral at bedtime    MEDICATIONS  (PRN):  acetaminophen   Tablet 650 milliGRAM(s) Oral every 6 hours PRN For Temp greater than 38 C (100.4 F)  acetaminophen   Tablet. 650 milliGRAM(s) Oral every 6 hours PRN Mild Pain (1 - 3)  bisacodyl Suppository 10 milliGRAM(s) Rectal daily PRN Constipation  LORazepam     Tablet 0.5 milliGRAM(s) Oral every 6 hours PRN Anxiety  polyethylene glycol 3350 17 Gram(s) Oral daily PRN Constipation

## 2018-08-01 NOTE — PROGRESS NOTE ADULT - ASSESSMENT
56 y/o F with h/o breast cancer s/p lumpectomy presented with sudden onset right sided weakness, slurred speech found to have acute left thalamic CVA now with gait instability, ADL and functional impairments.       #Left thalamic CVA  - continue Rehab Program of PT/OT   - ASA, Statin, monitor BP  - ILR as outpatient to r/o occult arrhythmia     #Diplopia--Left abducens nerve palsy  - Vision assessment and therapy in OT--May use eye patch during therapies for symptom control but avoid using eye patch throughout entire day.  - Will need neuro optometry assessment as outpatient.     #Dysphagia/dysarthria  - Mechanical soft w/ thins  - SLP evaluation and treatment   - Aspiration precautions     #Constipation  - Colace, Senna, Miralax, add prune juice with meals and PRN suppository if ineffective     #Anxiety/depression   - Lexapro, Ativan PRN     #Precautions / PROPHYLAXIS:   - Falls, Cardiac, Aspiration   - Pressure injury/Skin: Turn Q2hrs while in bed   - DVT: Lovenox    #Diet: Mechanical soft w/thin liquids DASH/TLC]      #Dispo: discussed in IDT meeting 7/31 - currently set up for self care, contact guard/min assist transfers, CG min Assist ambulation performing 12 steps min assist; Barriers include poor vision, impaired judgment, mild cognitive deficits. DC home with outpatient PT/OT/SLP on 8/8/18. Family/spouse training prior to DC.   will provide supervision upon discharge and plans to stay here in [place] with his wife as there are limited services where they live in [facility].

## 2018-08-02 LAB
ANION GAP SERPL CALC-SCNC: 9 MMOL/L — SIGNIFICANT CHANGE UP (ref 5–17)
BUN SERPL-MCNC: 16 MG/DL — SIGNIFICANT CHANGE UP (ref 7–23)
CALCIUM SERPL-MCNC: 9.4 MG/DL — SIGNIFICANT CHANGE UP (ref 8.4–10.5)
CHLORIDE SERPL-SCNC: 107 MMOL/L — SIGNIFICANT CHANGE UP (ref 96–108)
CO2 SERPL-SCNC: 28 MMOL/L — SIGNIFICANT CHANGE UP (ref 22–31)
CREAT SERPL-MCNC: 0.92 MG/DL — SIGNIFICANT CHANGE UP (ref 0.5–1.3)
GLUCOSE SERPL-MCNC: 82 MG/DL — SIGNIFICANT CHANGE UP (ref 70–99)
HCT VFR BLD CALC: 42.8 % — SIGNIFICANT CHANGE UP (ref 34.5–45)
HGB BLD-MCNC: 14 G/DL — SIGNIFICANT CHANGE UP (ref 11.5–15.5)
MCHC RBC-ENTMCNC: 28.5 PG — SIGNIFICANT CHANGE UP (ref 27–34)
MCHC RBC-ENTMCNC: 32.8 GM/DL — SIGNIFICANT CHANGE UP (ref 32–36)
MCV RBC AUTO: 86.8 FL — SIGNIFICANT CHANGE UP (ref 80–100)
PLATELET # BLD AUTO: 215 K/UL — SIGNIFICANT CHANGE UP (ref 150–400)
POTASSIUM SERPL-MCNC: 3.7 MMOL/L — SIGNIFICANT CHANGE UP (ref 3.5–5.3)
POTASSIUM SERPL-SCNC: 3.7 MMOL/L — SIGNIFICANT CHANGE UP (ref 3.5–5.3)
RBC # BLD: 4.93 M/UL — SIGNIFICANT CHANGE UP (ref 3.8–5.2)
RBC # FLD: 12.7 % — SIGNIFICANT CHANGE UP (ref 10.3–14.5)
SODIUM SERPL-SCNC: 144 MMOL/L — SIGNIFICANT CHANGE UP (ref 135–145)
WBC # BLD: 6.8 K/UL — SIGNIFICANT CHANGE UP (ref 3.8–10.5)
WBC # FLD AUTO: 6.8 K/UL — SIGNIFICANT CHANGE UP (ref 3.8–10.5)

## 2018-08-02 PROCEDURE — 99232 SBSQ HOSP IP/OBS MODERATE 35: CPT

## 2018-08-02 PROCEDURE — 99233 SBSQ HOSP IP/OBS HIGH 50: CPT

## 2018-08-02 RX ADMIN — ESCITALOPRAM OXALATE 10 MILLIGRAM(S): 10 TABLET, FILM COATED ORAL at 21:25

## 2018-08-02 RX ADMIN — Medication 100 MILLIGRAM(S): at 14:12

## 2018-08-02 RX ADMIN — Medication 100 MILLIGRAM(S): at 05:24

## 2018-08-02 RX ADMIN — Medication 1 APPLICATION(S): at 12:20

## 2018-08-02 RX ADMIN — ENOXAPARIN SODIUM 40 MILLIGRAM(S): 100 INJECTION SUBCUTANEOUS at 14:12

## 2018-08-02 RX ADMIN — SENNA PLUS 2 TABLET(S): 8.6 TABLET ORAL at 21:25

## 2018-08-02 RX ADMIN — Medication 1 TABLET(S): at 12:20

## 2018-08-02 RX ADMIN — Medication 100 MILLIGRAM(S): at 21:25

## 2018-08-02 RX ADMIN — PANTOPRAZOLE SODIUM 40 MILLIGRAM(S): 20 TABLET, DELAYED RELEASE ORAL at 06:20

## 2018-08-02 RX ADMIN — ATORVASTATIN CALCIUM 80 MILLIGRAM(S): 80 TABLET, FILM COATED ORAL at 21:25

## 2018-08-02 RX ADMIN — Medication 81 MILLIGRAM(S): at 12:20

## 2018-08-02 NOTE — PROGRESS NOTE ADULT - ASSESSMENT
58 y/o F with PMH breast cancer, depression admitted with right sided weakness, dysarthria, facial droop, found to have left thalamic CVA

## 2018-08-02 NOTE — PROGRESS NOTE ADULT - SUBJECTIVE AND OBJECTIVE BOX
Patient is a 57y old  Female who presents with a chief complaint of left thalamic CVA       Patient seen and examined at bedside. feels well, no complaints     ALLERGIES:  penicillin (Unknown)    MEDICATIONS:  bisacodyl Suppository 10 milliGRAM(s) Rectal daily PRN  docusate sodium 100 milliGRAM(s) Oral three times a day  escitalopram 10 milliGRAM(s) Oral at bedtime  nystatin Powder 1 Application(s) Topical two times a day  polyethylene glycol 3350 17 Gram(s) Oral daily PRN  senna 2 Tablet(s) Oral at bedtime    Vital Signs Last 24 Hrs  T(F): 97.5 (02 Aug 2018 07:39), Max: 98.2 (01 Aug 2018 21:28)  HR: 65 (02 Aug 2018 07:39) (65 - 78)  BP: 108/71 (02 Aug 2018 07:39) (102/65 - 108/71)  RR: 14 (02 Aug 2018 07:39) (14 - 14)  SpO2: 95% (02 Aug 2018 07:39) (95% - 97%)  I&O's Summary    01 Aug 2018 07:01  -  02 Aug 2018 07:00  --------------------------------------------------------  IN: 0 mL / OUT: 400 mL / NET: -400 mL    PHYSICAL EXAM:  General: NAD, alert oriented, dysarthria   Neck: Supple, No JVD  Lungs: Clear to auscultation bilaterally  Cardio: RRR, S1/S2, No murmurs  Abdomen: Soft, Nontender, Nondistended; Bowel sounds present  Extremities: No clubbing, cyanosis, or edema  right hemiparesis     LABS:                        14.0   6.8   )-----------( 215      ( 02 Aug 2018 05:05 )             42.8     08-02    144  |  107  |  16  ----------------------------<  82  3.7   |  28  |  0.92    Ca    9.4      02 Aug 2018 05:05      eGFR if Non African American: 68 mL/min/1.73M2 (08-02-18 @ 05:05)  eGFR if African American: 79 mL/min/1.73M2 (08-02-18 @ 05:05)    07-18 Chol 166 mg/dL  mg/dL HDL 34 mg/dL Trig 151 mg/dL    CAPILLARY BLOOD GLUCOSE    07-18 [id number] 5.7    RADIOLOGY & ADDITIONAL TESTS:    Care Discussed with Consultants/Other Providers:

## 2018-08-02 NOTE — PROGRESS NOTE ADULT - PROBLEM SELECTOR PLAN 1
right hemiparesis, dysphagia, dysarthria, diplopia  impaired ADLs/mobility  PT/OT, speech therapy  aspirin 81mg, lipitor 80mg

## 2018-08-02 NOTE — PROGRESS NOTE ADULT - ASSESSMENT
56 y/o F with h/o breast cancer s/p lumpectomy presented with sudden onset right sided weakness, slurred speech found to have acute left thalamic CVA now with gait instability, ADL and functional impairments.       #Left thalamic CVA  - continue Rehab Program of PT/OT   - ASA, Statin, monitor BP  - ILR as outpatient to r/o occult arrhythmia     #Diplopia--Left abducens nerve palsy  - Vision assessment and therapy in OT--May use eye patch during therapies for symptom control but avoid using eye patch throughout entire day.  - Will need neuro optometry assessment as outpatient.     #Dysphagia/dysarthria  - Mechanical soft w/ thins  - SLP evaluation and treatment   - Aspiration precautions     #Constipation  - Colace, Senna, Miralax, add prune juice with meals and PRN suppository if ineffective     #Anxiety/depression   - Lexapro, Ativan PRN     #Precautions / PROPHYLAXIS:   - Falls, Cardiac, Aspiration   - Pressure injury/Skin: Turn Q2hrs while in bed   - DVT: Lovenox    #Diet: Mechanical soft w/thin liquids DASH/TLC]      #Dispo: discussed in IDT meeting 7/31 - currently set up for self care, contact guard/min assist transfers, CG min Assist ambulation performing 12 steps min assist; Barriers include poor vision, impaired judgment, mild cognitive deficits. DC home with outpatient PT/OT/SLP on 8/8/18. Family/spouse training prior to DC.   will provide supervision upon discharge and plans to stay here in [place] with his wife as there are limited services where they live in [facility].

## 2018-08-02 NOTE — PROGRESS NOTE ADULT - SUBJECTIVE AND OBJECTIVE BOX
HPI:  Ms. Naila Banegas is a 57 year old female with PMH of left breast CA s/p 6-week course of radiation, lumpectomy, depression, former smoker who presented to [facility] ED c/o sudden onset right upper and lower extremity weakness, slurred speech and facial droop which had started the evening prior to presentation. Patient initially went to sleep after onset of symptoms hoping they would resolve, but awoke to find they had worsened.  Upon presentation NIHSS = 5, CT head showed acute/subacute left thalamic infarct, outside of tPA window. MRI Head shows left thalamic infarct with extension to midbrain without hemorrhage. Carotid doppler showed no significant stenosis.  TTE showed normal LVEF, mild diastolic (stage I) dysfunction, without evidence of structural abnormality or thrombus.  Hypercoagulable workup was unrevealing.  Etiology of stroke thought cardioembolic. Recommended by cardiology to have ILR placed as outpatient to rule out occult arrhythmia.  Failed bedside swallow evaluation and placed on mechanical soft with thins.  Deemed medically stable for acute rehab on 7/25/18.    Upon evaluation patient states feels residual right sided weakness.  Endorses double vision with both eyes open which improves by closing left eye.  She wore glasses prior to stroke which improved vision but now vision is worse.  Denies headache, palpitations, numbness/tingling, bowel/bladder incontinence.  She is hungry and requesting something to eat. (25 Jul 2018 15:06)      PAST MEDICAL & SURGICAL HISTORY:  Breast cancer: s/p Lumpectomy  No significant past surgical history    Subjective:  Sleeping well.  Denies headache, vision changes improving.  Tolerating therapy.  No new complaints.     REVIEW OF SYMPTOMS  X] Constitutional WNL     [X] Cardio WNL            [X] Resp WNL           [X] GI constipation                    [X]  WNL                 [X] Heme WNL              [X] Endo WNL                  [X] Skin--Psoarisis              [X] MSK--        [X] Neuro--right sensory deficit, diplopia                  [X] Cognitive WNL        [X] Psych-anxiety      Vital Signs Last 24 Hrs  T(C): 36.4 (02 Aug 2018 07:39), Max: 36.8 (01 Aug 2018 21:28)  T(F): 97.5 (02 Aug 2018 07:39), Max: 98.2 (01 Aug 2018 21:28)  HR: 65 (02 Aug 2018 07:39) (65 - 78)  BP: 108/71 (02 Aug 2018 07:39) (102/65 - 108/71)  BP(mean): --  RR: 14 (02 Aug 2018 07:39) (14 - 14)  SpO2: 95% (02 Aug 2018 07:39) (95% - 97%)      PHYSICAL EXAM  Gen - NAD, Comfortable  	HEENT - NCAT, Left eye with impaired lateral movement  	Neck - Supple, No limited ROM  	Pulm - CTAB, No wheeze, No rhonchi, No crackles  	Cardiovascular - RRR, S1S2, No murmurs  	Abdomen - Soft, NT/ND, +BS  	Extremities - No C/C/E, No calf tenderness  	Neuro-  	   Cognitive - AAOx3  	   Communication - Fluent, Dysarthria  	   Attention: Intact   	   Judgement: Good evidence of judgement  	   Memory: Recall 3 objects immediate and 3 min later      	   Cranial Nerves - Left CN VI (impaired lateral gaze) disconjugate gaze,  Impairment in upper visual field resolving,  diplopia when both eyes open, but getting better    	   Motor - No focal deficits  	                  LEFT    UE - ShAB 5/5, EF 5/5, EE 5/5, WE 5/5,  5/5  	                  RIGHT UE - ShAB 5/5, EF 5/5, EE 5/5, WE 5/5,  5/5  	                  LEFT    LE - HF 5/5, KE 5/5, DF 5/5, PF 5/5  	                  RIGHT LE - HF 4+/5, KE 4+/5, DF 5/5, PF 5/5     	   Sensory - Impaired to light touch on right arm/leg   	   Reflexes - DTR Intact,  	   Coordination - FTN impaired with dysmetria b/l, HTS intact  	   Tone - normal  	Psychiatric - Mood stable, Affect WNL  	Skin:   psoriatic plaques bilateral medial malleoli \    FUNCTIONAL PROGRESS  Gait - amb RW CG/min A, 12 steps min A.  ADLs - Sup exc. CG transfers     LABS:                        14.0   6.8   )-----------( 215      ( 02 Aug 2018 05:05 )             42.8     02 Aug 2018 05:05    144    |  107    |  16     ----------------------------<  82     3.7     |  28     |  0.92     Ca    9.4        02 Aug 2018 05:05    RADIOLOGY/OTHER RESULTS    MEDICATIONS  (STANDING):  aspirin  chewable 81 milliGRAM(s) Oral daily  atorvastatin 80 milliGRAM(s) Oral at bedtime  docusate sodium 100 milliGRAM(s) Oral three times a day  enoxaparin Injectable 40 milliGRAM(s) SubCutaneous every 24 hours  escitalopram 10 milliGRAM(s) Oral at bedtime  hydrocortisone 1% Cream 1 Application(s) Topical daily  multivitamin 1 Tablet(s) Oral daily  nystatin Powder 1 Application(s) Topical two times a day  pantoprazole    Tablet 40 milliGRAM(s) Oral before breakfast  senna 2 Tablet(s) Oral at bedtime    MEDICATIONS  (PRN):  acetaminophen   Tablet 650 milliGRAM(s) Oral every 6 hours PRN For Temp greater than 38 C (100.4 F)  acetaminophen   Tablet. 650 milliGRAM(s) Oral every 6 hours PRN Mild Pain (1 - 3)  bisacodyl Suppository 10 milliGRAM(s) Rectal daily PRN Constipation  polyethylene glycol 3350 17 Gram(s) Oral daily PRN Constipation

## 2018-08-03 PROCEDURE — 99232 SBSQ HOSP IP/OBS MODERATE 35: CPT

## 2018-08-03 RX ADMIN — ESCITALOPRAM OXALATE 10 MILLIGRAM(S): 10 TABLET, FILM COATED ORAL at 21:02

## 2018-08-03 RX ADMIN — Medication 1 TABLET(S): at 12:07

## 2018-08-03 RX ADMIN — Medication 100 MILLIGRAM(S): at 06:31

## 2018-08-03 RX ADMIN — ENOXAPARIN SODIUM 40 MILLIGRAM(S): 100 INJECTION SUBCUTANEOUS at 14:21

## 2018-08-03 RX ADMIN — Medication 81 MILLIGRAM(S): at 12:07

## 2018-08-03 RX ADMIN — PANTOPRAZOLE SODIUM 40 MILLIGRAM(S): 20 TABLET, DELAYED RELEASE ORAL at 06:31

## 2018-08-03 RX ADMIN — Medication 100 MILLIGRAM(S): at 14:21

## 2018-08-03 RX ADMIN — NYSTATIN CREAM 1 APPLICATION(S): 100000 CREAM TOPICAL at 16:57

## 2018-08-03 RX ADMIN — Medication 100 MILLIGRAM(S): at 21:02

## 2018-08-03 RX ADMIN — ATORVASTATIN CALCIUM 80 MILLIGRAM(S): 80 TABLET, FILM COATED ORAL at 21:02

## 2018-08-03 RX ADMIN — NYSTATIN CREAM 1 APPLICATION(S): 100000 CREAM TOPICAL at 06:31

## 2018-08-03 RX ADMIN — SENNA PLUS 2 TABLET(S): 8.6 TABLET ORAL at 21:02

## 2018-08-03 RX ADMIN — Medication 1 APPLICATION(S): at 12:07

## 2018-08-03 NOTE — PROGRESS NOTE ADULT - SUBJECTIVE AND OBJECTIVE BOX
HPI:  Ms. Naila Banegas is a 57 year old female with PMH of left breast CA s/p 6-week course of radiation, lumpectomy, depression, former smoker who presented to [facility] ED c/o sudden onset right upper and lower extremity weakness, slurred speech and facial droop which had started the evening prior to presentation. Patient initially went to sleep after onset of symptoms hoping they would resolve, but awoke to find they had worsened.  Upon presentation NIHSS = 5, CT head showed acute/subacute left thalamic infarct, outside of tPA window. MRI Head shows left thalamic infarct with extension to midbrain without hemorrhage. Carotid doppler showed no significant stenosis.  TTE showed normal LVEF, mild diastolic (stage I) dysfunction, without evidence of structural abnormality or thrombus.  Hypercoagulable workup was unrevealing.  Etiology of stroke thought cardioembolic. Recommended by cardiology to have ILR placed as outpatient to rule out occult arrhythmia.  Failed bedside swallow evaluation and placed on mechanical soft with thins.  Deemed medically stable for acute rehab on 7/25/18.    Upon evaluation patient states feels residual right sided weakness.  Endorses double vision with both eyes open which improves by closing left eye.  She wore glasses prior to stroke which improved vision but now vision is worse.  Denies headache, palpitations, numbness/tingling, bowel/bladder incontinence.  She is hungry and requesting something to eat. (25 Jul 2018 15:06)      PAST MEDICAL & SURGICAL HISTORY:  Breast cancer: s/p Lumpectomy  No significant past surgical history    Subjective:  Sleeping well.   at bedside. Requests to have meal preferences.  Denies headache, vision changes improving.  Tolerating therapy.  No new complaints.     REVIEW OF SYMPTOMS  X] Constitutional WNL     [X] Cardio WNL            [X] Resp WNL           [X] GI constipation                    [X]  WNL                 [X] Heme WNL              [X] Endo WNL                  [X] Skin--Psoarisis              [X] MSK--        [X] Neuro--right sensory deficit, diplopia                  [X] Cognitive WNL        [X] Psych-anxiety    Vital Signs Last 24 Hrs  T(C): 36.6 (03 Aug 2018 07:35), Max: 36.8 (02 Aug 2018 21:28)  T(F): 97.8 (03 Aug 2018 07:35), Max: 98.2 (02 Aug 2018 21:28)  HR: 68 (03 Aug 2018 07:35) (68 - 68)  BP: 99/66 (03 Aug 2018 07:35) (99/66 - 105/74)  BP(mean): --  RR: 14 (03 Aug 2018 07:35) (14 - 14)  SpO2: 95% (03 Aug 2018 07:35) (95% - 96%)      PHYSICAL EXAM  Gen - NAD, Comfortable  	HEENT - NCAT, Left eye with impaired lateral movement, improving   	Neck - Supple, No limited ROM  	Pulm - CTAB, No wheeze, No rhonchi, No crackles  	Cardiovascular - RRR, S1S2, No murmurs  	Abdomen - Soft, NT/ND, +BS  	Extremities - No C/C/E, No calf tenderness  	Neuro-  	   Cognitive - AAOx3  	   Communication - Fluent, Dysarthria  	   Attention: Intact   	   Judgement: Good evidence of judgement  	   Memory: Recall 3 objects immediate and 3 min later      	   Cranial Nerves - Left CN VI (impaired lateral gaze) disconjugate gaze,  Impairment in upper visual field resolving,  diplopia when both eyes open, but getting better    	   Motor - No focal deficits  	                  LEFT    UE - ShAB 5/5, EF 5/5, EE 5/5, WE 5/5,  5/5  	                  RIGHT UE - ShAB 5/5, EF 5/5, EE 5/5, WE 5/5,  5/5  	                  LEFT    LE - HF 5/5, KE 5/5, DF 5/5, PF 5/5  	                  RIGHT LE - HF 4+/5, KE 4+/5, DF 5/5, PF 5/5     	   Sensory - Impaired to light touch on right arm/leg   	   Reflexes - DTR Intact,  	   Coordination - FTN impaired with dysmetria b/l, HTS intact  	   Tone - normal  	Psychiatric - Mood stable, Affect WNL  	Skin:   psoriatic plaques bilateral medial malleoli \    FUNCTIONAL PROGRESS  Gait - amb RW CG/min A, 12 steps min A.  ADLs - Sup exc. CG transfers     LABS:                        14.0   6.8   )-----------( 215      ( 02 Aug 2018 05:05 )             42.8     02 Aug 2018 05:05    144    |  107    |  16     ----------------------------<  82     3.7     |  28     |  0.92     Ca    9.4        02 Aug 2018 05:05    RADIOLOGY/OTHER RESULTS    MEDICATIONS  (STANDING):  aspirin  chewable 81 milliGRAM(s) Oral daily  atorvastatin 80 milliGRAM(s) Oral at bedtime  docusate sodium 100 milliGRAM(s) Oral three times a day  enoxaparin Injectable 40 milliGRAM(s) SubCutaneous every 24 hours  escitalopram 10 milliGRAM(s) Oral at bedtime  hydrocortisone 1% Cream 1 Application(s) Topical daily  multivitamin 1 Tablet(s) Oral daily  nystatin Powder 1 Application(s) Topical two times a day  pantoprazole    Tablet 40 milliGRAM(s) Oral before breakfast  senna 2 Tablet(s) Oral at bedtime    MEDICATIONS  (PRN):  acetaminophen   Tablet 650 milliGRAM(s) Oral every 6 hours PRN For Temp greater than 38 C (100.4 F)  acetaminophen   Tablet. 650 milliGRAM(s) Oral every 6 hours PRN Mild Pain (1 - 3)  bisacodyl Suppository 10 milliGRAM(s) Rectal daily PRN Constipation  polyethylene glycol 3350 17 Gram(s) Oral daily PRN Constipation HPI:  Ms. Naila Banegas is a 57 year old female with PMH of left breast CA s/p 6-week course of radiation, lumpectomy, depression, former smoker who presented to [facility] ED c/o sudden onset right upper and lower extremity weakness, slurred speech and facial droop which had started the evening prior to presentation. Patient initially went to sleep after onset of symptoms hoping they would resolve, but awoke to find they had worsened.  Upon presentation NIHSS = 5, CT head showed acute/subacute left thalamic infarct, outside of tPA window. MRI Head shows left thalamic infarct with extension to midbrain without hemorrhage. Carotid doppler showed no significant stenosis.  TTE showed normal LVEF, mild diastolic (stage I) dysfunction, without evidence of structural abnormality or thrombus.  Hypercoagulable workup was unrevealing.  Etiology of stroke thought cardioembolic. Recommended by cardiology to have ILR placed as outpatient to rule out occult arrhythmia.  Failed bedside swallow evaluation and placed on mechanical soft with thins.  Deemed medically stable for acute rehab on 7/25/18.    Upon evaluation patient states feels residual right sided weakness.  Endorses double vision with both eyes open which improves by closing left eye.  She wore glasses prior to stroke which improved vision but now vision is worse.  Denies headache, palpitations, numbness/tingling, bowel/bladder incontinence.  She is hungry and requesting something to eat. (25 Jul 2018 15:06)      PAST MEDICAL & SURGICAL HISTORY:  Breast cancer: s/p Lumpectomy  No significant past surgical history    Subjective:  Sleeping well.   at bedside. Requests to have meal preferences.  Denies headache, vision changes improving.  Tolerating therapy.  No new complaints.     REVIEW OF SYMPTOMS  X] Constitutional WNL     [X] Cardio WNL            [X] Resp WNL           [X] GI constipation                    [X]  WNL                 [X] Heme WNL              [X] Endo WNL                  [X] Skin--Psoarisis              [X] MSK--        [X] Neuro--right sensory deficit, diplopia                  [X] Cognitive WNL        [X] Psych-anxiety    Vital Signs Last 24 Hrs  T(C): 36.6 (03 Aug 2018 07:35), Max: 36.8 (02 Aug 2018 21:28)  T(F): 97.8 (03 Aug 2018 07:35), Max: 98.2 (02 Aug 2018 21:28)  HR: 68 (03 Aug 2018 07:35) (68 - 68)  BP: 99/66 (03 Aug 2018 07:35) (99/66 - 105/74)  BP(mean): --  RR: 14 (03 Aug 2018 07:35) (14 - 14)  SpO2: 95% (03 Aug 2018 07:35) (95% - 96%)      PHYSICAL EXAM  Gen - NAD, Comfortable  	HEENT - NCAT, Left eye with impaired lateral movement, improving   	Neck - Supple, No limited ROM  	Pulm - CTAB, No wheeze, No rhonchi, No crackles  	Cardiovascular - RRR, S1S2, No murmurs  	Abdomen - Soft, NT/ND, +BS  	Extremities - No C/C/E, No calf tenderness  	Neuro-  	   Cognitive - AAOx3  	   Communication - Fluent, Dysarthria  	   Attention: Intact   	   Judgement: Good evidence of judgement  	   Memory: Recall 3 objects immediate and 3 min later      	   Cranial Nerves - Left CN VI palsy --improving--left eye depressed and medially deviated but upward and lateral movements gradually improving,  Impairment in upper visual field resolving,  diplopia when looking left  	   Motor - No focal deficits  	                  LEFT    UE - ShAB 5/5, EF 5/5, EE 5/5, WE 5/5,  5/5  	                  RIGHT UE - ShAB 5/5, EF 5/5, EE 5/5, WE 5/5,  5/5  	                  LEFT    LE - HF 5/5, KE 5/5, DF 5/5, PF 5/5  	                  RIGHT LE - HF 4+/5, KE 4+/5, DF 5/5, PF 5/5     	   Sensory - Impaired to light touch on right arm/leg   	   Reflexes - DTR Intact,  	   Coordination - FTN impaired with dysmetria b/l, HTS intact  	   Tone - normal  	Psychiatric - Mood stable, Affect WNL  	Skin:   psoriatic plaques bilateral medial malleoli \    FUNCTIONAL PROGRESS  Gait - amb RW CG/min A, 12 steps min A.  ADLs - Sup exc. CG transfers     LABS:                        14.0   6.8   )-----------( 215      ( 02 Aug 2018 05:05 )             42.8     02 Aug 2018 05:05    144    |  107    |  16     ----------------------------<  82     3.7     |  28     |  0.92     Ca    9.4        02 Aug 2018 05:05    RADIOLOGY/OTHER RESULTS    MEDICATIONS  (STANDING):  aspirin  chewable 81 milliGRAM(s) Oral daily  atorvastatin 80 milliGRAM(s) Oral at bedtime  docusate sodium 100 milliGRAM(s) Oral three times a day  enoxaparin Injectable 40 milliGRAM(s) SubCutaneous every 24 hours  escitalopram 10 milliGRAM(s) Oral at bedtime  hydrocortisone 1% Cream 1 Application(s) Topical daily  multivitamin 1 Tablet(s) Oral daily  nystatin Powder 1 Application(s) Topical two times a day  pantoprazole    Tablet 40 milliGRAM(s) Oral before breakfast  senna 2 Tablet(s) Oral at bedtime    MEDICATIONS  (PRN):  acetaminophen   Tablet 650 milliGRAM(s) Oral every 6 hours PRN For Temp greater than 38 C (100.4 F)  acetaminophen   Tablet. 650 milliGRAM(s) Oral every 6 hours PRN Mild Pain (1 - 3)  bisacodyl Suppository 10 milliGRAM(s) Rectal daily PRN Constipation  polyethylene glycol 3350 17 Gram(s) Oral daily PRN Constipation

## 2018-08-03 NOTE — PROGRESS NOTE ADULT - ASSESSMENT
56 y/o F with h/o breast cancer s/p lumpectomy presented with sudden onset right sided weakness, slurred speech found to have acute left thalamic CVA now with gait instability, ADL and functional impairments.       #Left thalamic CVA  - continue Rehab Program of PT/OT   - ASA, Statin, monitor BP  - ILR as outpatient to r/o occult arrhythmia     #Diplopia--Left abducens nerve palsy, improving   - Vision assessment and therapy in OT--May use eye patch during therapies for symptom control but avoid using eye patch throughout entire day.  - Will need neuro optometry assessment as outpatient.     #Dysphagia/dysarthria  - soft w/ thins  - SLP evaluation and treatment   - Aspiration precautions     #Constipation  - Colace, Senna, Miralax, add prune juice with meals and PRN suppository if ineffective     #Anxiety/depression   - Lexapro, Ativan PRN     #Precautions / PROPHYLAXIS:   - Falls, Cardiac, Aspiration   - Pressure injury/Skin: Turn Q2hrs while in bed   - DVT: Lovenox    #Diet: Mechanical soft w/thin liquids DASH/TLC. Will provide meal preferences      #Dispo: discussed in IDT meeting 7/31 - currently set up for self care, contact guard/min assist transfers, CG min Assist ambulation performing 12 steps min assist; Barriers include poor vision, impaired judgment, mild cognitive deficits. DC home with outpatient PT/OT/SLP on 8/8/18. Family/spouse training prior to DC.   will provide supervision upon discharge and plans to stay here in [place] with his wife as there are limited services where they live in [facility].

## 2018-08-03 NOTE — CHART NOTE - NSCHARTNOTEFT_GEN_A_CORE
Nutrition follow up     Hospital course as per chart: Pt is a 56 y/o F with h/o breast cancer S/P lumpectomy presented with sudden onset right sided weakness, slurred speech found to have acute left thalamic CVA now with gait instability, ADL and functional impairments, dysphagia, constipation - on bowel regimen. Malnutrition (moderate) label placed in chart (07/26). Noted SLP consult pending as per chart.     Pt reports good appetite and PO intake.  reports pt with good PO intake when food preferences are obtained. Pt refuses nutritional supplements at this time. Noted % PO intake as per flow sheets. Diet advanced from mechanical soft to soft (07/30) as per chart. Pt reports tolerating current diet consistency, denies difficulty chewing/swallowing. Pt denies nausea, vomiting, diarrhea, or constipation, reports last BM yesterday (08/02). Reviewed menu order procedures in the hospital. Pt denies having questions/concerns about diet or nutrition.    Source: Patient [x]    Family [x]     other [x]; Medical record;  at bedside    Diet: DASH/TLC + dysphagia 3, soft diet + thin liquids      Enteral /Parenteral Nutrition: n/a    Current Weight: (07/25) 206.3 pounds -> (07/29) 205.4 pounds -> (08/03) 206.7 pounds -> weight likely to be stable at this time.   % Weight Change: n/a    Pertinent Medications: MEDICATIONS  (STANDING):  aspirin  chewable 81 milliGRAM(s) Oral daily  atorvastatin 80 milliGRAM(s) Oral at bedtime  docusate sodium 100 milliGRAM(s) Oral three times a day  enoxaparin Injectable 40 milliGRAM(s) SubCutaneous every 24 hours  escitalopram 10 milliGRAM(s) Oral at bedtime  hydrocortisone 1% Cream 1 Application(s) Topical daily  multivitamin 1 Tablet(s) Oral daily  nystatin Powder 1 Application(s) Topical two times a day  pantoprazole    Tablet 40 milliGRAM(s) Oral before breakfast  senna 2 Tablet(s) Oral at bedtime    MEDICATIONS  (PRN):  acetaminophen   Tablet 650 milliGRAM(s) Oral every 6 hours PRN For Temp greater than 38 C (100.4 F)  acetaminophen   Tablet. 650 milliGRAM(s) Oral every 6 hours PRN Mild Pain (1 - 3)  bisacodyl Suppository 10 milliGRAM(s) Rectal daily PRN Constipation  polyethylene glycol 3350 17 Gram(s) Oral daily PRN Constipation    Pertinent Labs: (08/02) Basic metabolic panel WNL  (07/18) [id number] 5.7 %    Skin: no noted pressure injuries as per documentation.   No noted edema as per flow sheets.     Estimated Needs:   [x] no change since previous assessment  [ ] recalculated:     Previous Nutrition Diagnosis:     [x] Malnutrition (moderate)    Nutrition Diagnosis is [x] ongoing - being addressed with diet as above, diet consistency as per SLP, pt refusing supplementation at this time, reviewing food preferences with patient.     New Nutrition Diagnosis: [x] not applicable    Interventions:     1. Recommend continue current diet as above. Defer diet/fluid consistency to team.   2. Encourage PO intake, obtain food preferences, provide feeding assistance as needed.   3. Provided recommendations to increase intake in case of decreased appetite, reviewed menu order procedures.   4. Continue to obtain weights to identify changes if any.     Monitoring and Evaluation:     [x] PO intake [x] Tolerance to diet prescription [x] weights [x] follow up per protocol    [x] other: SLP recommendations.     RD remains available.   Cece Katz Dietitian

## 2018-08-04 PROCEDURE — 99232 SBSQ HOSP IP/OBS MODERATE 35: CPT

## 2018-08-04 PROCEDURE — 99232 SBSQ HOSP IP/OBS MODERATE 35: CPT | Mod: GC

## 2018-08-04 RX ADMIN — Medication 1 TABLET(S): at 11:36

## 2018-08-04 RX ADMIN — Medication 100 MILLIGRAM(S): at 21:58

## 2018-08-04 RX ADMIN — ESCITALOPRAM OXALATE 10 MILLIGRAM(S): 10 TABLET, FILM COATED ORAL at 21:59

## 2018-08-04 RX ADMIN — Medication 100 MILLIGRAM(S): at 13:19

## 2018-08-04 RX ADMIN — ATORVASTATIN CALCIUM 80 MILLIGRAM(S): 80 TABLET, FILM COATED ORAL at 21:58

## 2018-08-04 RX ADMIN — Medication 100 MILLIGRAM(S): at 06:27

## 2018-08-04 RX ADMIN — Medication 1 APPLICATION(S): at 11:36

## 2018-08-04 RX ADMIN — NYSTATIN CREAM 1 APPLICATION(S): 100000 CREAM TOPICAL at 17:16

## 2018-08-04 RX ADMIN — ENOXAPARIN SODIUM 40 MILLIGRAM(S): 100 INJECTION SUBCUTANEOUS at 13:19

## 2018-08-04 RX ADMIN — PANTOPRAZOLE SODIUM 40 MILLIGRAM(S): 20 TABLET, DELAYED RELEASE ORAL at 06:27

## 2018-08-04 RX ADMIN — SENNA PLUS 2 TABLET(S): 8.6 TABLET ORAL at 21:58

## 2018-08-04 RX ADMIN — Medication 81 MILLIGRAM(S): at 11:36

## 2018-08-04 RX ADMIN — NYSTATIN CREAM 1 APPLICATION(S): 100000 CREAM TOPICAL at 06:28

## 2018-08-04 NOTE — PROGRESS NOTE ADULT - SUBJECTIVE AND OBJECTIVE BOX
HPI  Pt is a 58yo F is admitted for Left thalamic CVA    Pt was seen and examined at bedside. hemodynamically stable, no new complaints     Vital Signs Last 24 Hrs  T(C): 36.6 (04 Aug 2018 08:52), Max: 36.8 (03 Aug 2018 20:54)  T(F): 97.8 (04 Aug 2018 08:52), Max: 98.2 (03 Aug 2018 20:54)  HR: 80 (04 Aug 2018 08:52) (62 - 80)  BP: 117/77 (04 Aug 2018 08:52) (101/68 - 117/77)  BP(mean): --  RR: 14 (04 Aug 2018 08:52) (14 - 14)  SpO2: 99% (04 Aug 2018 08:52) (95% - 99%)    I&O's Summary      CAPILLARY BLOOD GLUCOSE          PHYSICAL EXAM:    Constitutional: NAD  HEENT: PERR, EOMI  Neck: Soft and supple  Respiratory: Breath sounds are clear bilaterally  Cardiovascular: S1 and S2  Gastrointestinal: Bowel Sounds present  Extremities: No peripheral edema  Vascular: 2+ peripheral pulses  Musculoskeletal: right hemiparesis   Skin: No rashes    MEDICATIONS:  MEDICATIONS  (STANDING):  aspirin  chewable 81 milliGRAM(s) Oral daily  atorvastatin 80 milliGRAM(s) Oral at bedtime  docusate sodium 100 milliGRAM(s) Oral three times a day  enoxaparin Injectable 40 milliGRAM(s) SubCutaneous every 24 hours  escitalopram 10 milliGRAM(s) Oral at bedtime  hydrocortisone 1% Cream 1 Application(s) Topical daily  multivitamin 1 Tablet(s) Oral daily  nystatin Powder 1 Application(s) Topical two times a day  pantoprazole    Tablet 40 milliGRAM(s) Oral before breakfast  senna 2 Tablet(s) Oral at bedtime      LABS: All Labs Reviewed:                Blood Culture:     RADIOLOGY/EKG:    DVT PPX:    ADVANCED DIRECTIVE:    DISPOSITION:

## 2018-08-04 NOTE — PROGRESS NOTE ADULT - SUBJECTIVE AND OBJECTIVE BOX
Chief complaint: no new complaints    Patient is a 57y old  Female who presents with a chief complaint of left thalamic CVA (25 Jul 2018 15:06)      HISTORY OF PRESENT ILLNESS  HEALTH ISSUES - PROBLEM Dx:  Moderate episode of recurrent major depressive disorder: Moderate episode of recurrent major depressive disorder  Malignant neoplasm of female breast, unspecified estrogen receptor status, unspecified laterality, unspecified site of breast: Malignant neoplasm of female breast, unspecified estrogen receptor status, unspecified laterality, unspecified site of breast  Thalamic infarct, acute: Thalamic infarct, acute            PMHx -   PAST MEDICAL & SURGICAL HISTORY:  Breast cancer: s/p Lumpectomy  No significant past surgical history       REVIEW OF SYMPTOMS  [X] Constitutional WNL     [X] Cardio WNL            [X] Resp WNL           [X] GI WNL                          [X]  WNL                   [X] Heme WNL              [X] Endo WNL                     [X] Skin WNL                 [X] MSK WNL            [X] Neuro WNL                   [X] Cognitive WNL        [X] Psych WNL      VITALS  Vital Signs Last 24 Hrs  T(C): 36.6 (04 Aug 2018 08:52), Max: 36.8 (03 Aug 2018 20:54)  T(F): 97.8 (04 Aug 2018 08:52), Max: 98.2 (03 Aug 2018 20:54)  HR: 80 (04 Aug 2018 08:52) (62 - 80)  BP: 117/77 (04 Aug 2018 08:52) (101/68 - 117/77)  BP(mean): --  RR: 14 (04 Aug 2018 08:52) (14 - 14)  SpO2: 99% (04 Aug 2018 08:52) (95% - 99%)      PHYSICAL EXAM  Constitutional - NAD, Comfortable  HEENT - NCAT, EOMI  Neck - Supple, No limited ROM  Chest - CTA bilaterally  Cardiovascular - RRR, S1S2, No murmurs  Abdomen - BS+, Soft, NTND  Extremities - No C/C/E, No calf tenderness   Neurologic Exam -                      No new focal deficits                     RECENT LABS                  RADIOLOGY/OTHER RESULTS      CURRENT MEDICATIONS    MEDICATIONS  (STANDING):  aspirin  chewable 81 milliGRAM(s) Oral daily  atorvastatin 80 milliGRAM(s) Oral at bedtime  docusate sodium 100 milliGRAM(s) Oral three times a day  enoxaparin Injectable 40 milliGRAM(s) SubCutaneous every 24 hours  escitalopram 10 milliGRAM(s) Oral at bedtime  hydrocortisone 1% Cream 1 Application(s) Topical daily  multivitamin 1 Tablet(s) Oral daily  nystatin Powder 1 Application(s) Topical two times a day  pantoprazole    Tablet 40 milliGRAM(s) Oral before breakfast  senna 2 Tablet(s) Oral at bedtime    MEDICATIONS  (PRN):  acetaminophen   Tablet 650 milliGRAM(s) Oral every 6 hours PRN For Temp greater than 38 C (100.4 F)  acetaminophen   Tablet. 650 milliGRAM(s) Oral every 6 hours PRN Mild Pain (1 - 3)  bisacodyl Suppository 10 milliGRAM(s) Rectal daily PRN Constipation  polyethylene glycol 3350 17 Gram(s) Oral daily PRN Constipation    ASSESSMENT & PLAN          GI/Bowel Management - Dulcolax PRN, Fleet PRN   Management - Toilet Q2  Skin - Turn Q2  Pain - Tylenol PRN  DVT PPX - Lovenox    Continue current treatment plan    Continue comprehensive acute rehab program consisting of 3hrs/day of OT/PT and SLP.

## 2018-08-04 NOTE — PROGRESS NOTE ADULT - ASSESSMENT
Pt is a 56yo F is admitted for Left thalamic CVA    *left thalamic CVA  PT/OT/Speech therapy   BP control   continue ASA and statin   Fall precaution     *Left breast CA  follow up heme onc as out patient    *depression   Continue Lexapro     * dysphagia   tolerate  soft diet well  Cont speech Therapy 4  aspiration precaution     *prophylactic measure  DVT ppx  GI ppx   Bowel regimen

## 2018-08-05 PROCEDURE — 99232 SBSQ HOSP IP/OBS MODERATE 35: CPT

## 2018-08-05 RX ADMIN — ESCITALOPRAM OXALATE 10 MILLIGRAM(S): 10 TABLET, FILM COATED ORAL at 21:45

## 2018-08-05 RX ADMIN — SENNA PLUS 2 TABLET(S): 8.6 TABLET ORAL at 21:46

## 2018-08-05 RX ADMIN — PANTOPRAZOLE SODIUM 40 MILLIGRAM(S): 20 TABLET, DELAYED RELEASE ORAL at 06:02

## 2018-08-05 RX ADMIN — ATORVASTATIN CALCIUM 80 MILLIGRAM(S): 80 TABLET, FILM COATED ORAL at 21:45

## 2018-08-05 RX ADMIN — Medication 1 APPLICATION(S): at 11:02

## 2018-08-05 RX ADMIN — Medication 81 MILLIGRAM(S): at 11:02

## 2018-08-05 RX ADMIN — Medication 100 MILLIGRAM(S): at 05:42

## 2018-08-05 RX ADMIN — Medication 100 MILLIGRAM(S): at 21:46

## 2018-08-05 RX ADMIN — Medication 100 MILLIGRAM(S): at 13:50

## 2018-08-05 RX ADMIN — Medication 1 TABLET(S): at 11:02

## 2018-08-05 RX ADMIN — ENOXAPARIN SODIUM 40 MILLIGRAM(S): 100 INJECTION SUBCUTANEOUS at 13:50

## 2018-08-05 RX ADMIN — NYSTATIN CREAM 1 APPLICATION(S): 100000 CREAM TOPICAL at 18:06

## 2018-08-05 NOTE — PROGRESS NOTE ADULT - SUBJECTIVE AND OBJECTIVE BOX
Chief complaint: no new complaints    Patient is a 57y old  Female who presents with a chief complaint of left thalamic CVA (25 Jul 2018 15:06)      HISTORY OF PRESENT ILLNESS  HEALTH ISSUES - PROBLEM Dx:  Moderate episode of recurrent major depressive disorder: Moderate episode of recurrent major depressive disorder  Malignant neoplasm of female breast, unspecified estrogen receptor status, unspecified laterality, unspecified site of breast: Malignant neoplasm of female breast, unspecified estrogen receptor status, unspecified laterality, unspecified site of breast  Thalamic infarct, acute: Thalamic infarct, acute            PMHx -   PAST MEDICAL & SURGICAL HISTORY:  Breast cancer: s/p Lumpectomy  No significant past surgical history       VITALS  Vital Signs Last 24 Hrs  T(C): 36.7 (05 Aug 2018 08:29), Max: 36.9 (04 Aug 2018 21:54)  T(F): 98.1 (05 Aug 2018 08:29), Max: 98.4 (04 Aug 2018 21:54)  HR: 80 (05 Aug 2018 08:29) (74 - 85)  BP: 118/62 (05 Aug 2018 08:29) (102/67 - 118/62)  BP(mean): --  RR: 14 (05 Aug 2018 08:29) (14 - 15)  SpO2: 97% (05 Aug 2018 08:29) (95% - 97%)      PHYSICAL EXAM  Constitutional - NAD, Comfortable  HEENT - NCAT, EOMI  Neck - Supple, No limited ROM  Chest - CTA bilaterally  Cardiovascular - RRR, S1S2, No murmurs  Abdomen - BS+, Soft, NTND  Extremities - No C/C/E, No calf tenderness   Neurologic Exam -                      No new focal deficits                 CURRENT MEDICATIONS    MEDICATIONS  (STANDING):  aspirin  chewable 81 milliGRAM(s) Oral daily  atorvastatin 80 milliGRAM(s) Oral at bedtime  docusate sodium 100 milliGRAM(s) Oral three times a day  enoxaparin Injectable 40 milliGRAM(s) SubCutaneous every 24 hours  escitalopram 10 milliGRAM(s) Oral at bedtime  hydrocortisone 1% Cream 1 Application(s) Topical daily  multivitamin 1 Tablet(s) Oral daily  nystatin Powder 1 Application(s) Topical two times a day  pantoprazole    Tablet 40 milliGRAM(s) Oral before breakfast  senna 2 Tablet(s) Oral at bedtime    MEDICATIONS  (PRN):  acetaminophen   Tablet 650 milliGRAM(s) Oral every 6 hours PRN For Temp greater than 38 C (100.4 F)  acetaminophen   Tablet. 650 milliGRAM(s) Oral every 6 hours PRN Mild Pain (1 - 3)  bisacodyl Suppository 10 milliGRAM(s) Rectal daily PRN Constipation  polyethylene glycol 3350 17 Gram(s) Oral daily PRN Constipation    ASSESSMENT & PLAN          GI/Bowel Management - Dulcolax PRN, Fleet PRN   Management - Toilet Q2  Skin - Turn Q2  Pain - Tylenol PRN  DVT PPX - Lovenox    Continue current treatment  plan  Continue comprehensive acute rehab program consisting of 3hrs/day of OT/PT and SLP.

## 2018-08-06 LAB
ANION GAP SERPL CALC-SCNC: 8 MMOL/L — SIGNIFICANT CHANGE UP (ref 5–17)
BUN SERPL-MCNC: 13 MG/DL — SIGNIFICANT CHANGE UP (ref 7–23)
CALCIUM SERPL-MCNC: 9.3 MG/DL — SIGNIFICANT CHANGE UP (ref 8.4–10.5)
CHLORIDE SERPL-SCNC: 105 MMOL/L — SIGNIFICANT CHANGE UP (ref 96–108)
CO2 SERPL-SCNC: 27 MMOL/L — SIGNIFICANT CHANGE UP (ref 22–31)
CREAT SERPL-MCNC: 0.84 MG/DL — SIGNIFICANT CHANGE UP (ref 0.5–1.3)
GLUCOSE SERPL-MCNC: 87 MG/DL — SIGNIFICANT CHANGE UP (ref 70–99)
HCT VFR BLD CALC: 42 % — SIGNIFICANT CHANGE UP (ref 34.5–45)
HGB BLD-MCNC: 13.6 G/DL — SIGNIFICANT CHANGE UP (ref 11.5–15.5)
MCHC RBC-ENTMCNC: 28 PG — SIGNIFICANT CHANGE UP (ref 27–34)
MCHC RBC-ENTMCNC: 32.4 GM/DL — SIGNIFICANT CHANGE UP (ref 32–36)
MCV RBC AUTO: 86.7 FL — SIGNIFICANT CHANGE UP (ref 80–100)
PLATELET # BLD AUTO: 210 K/UL — SIGNIFICANT CHANGE UP (ref 150–400)
POTASSIUM SERPL-MCNC: 4.1 MMOL/L — SIGNIFICANT CHANGE UP (ref 3.5–5.3)
POTASSIUM SERPL-SCNC: 4.1 MMOL/L — SIGNIFICANT CHANGE UP (ref 3.5–5.3)
RBC # BLD: 4.85 M/UL — SIGNIFICANT CHANGE UP (ref 3.8–5.2)
RBC # FLD: 12.6 % — SIGNIFICANT CHANGE UP (ref 10.3–14.5)
SODIUM SERPL-SCNC: 140 MMOL/L — SIGNIFICANT CHANGE UP (ref 135–145)
WBC # BLD: 6.6 K/UL — SIGNIFICANT CHANGE UP (ref 3.8–10.5)
WBC # FLD AUTO: 6.6 K/UL — SIGNIFICANT CHANGE UP (ref 3.8–10.5)

## 2018-08-06 PROCEDURE — 99232 SBSQ HOSP IP/OBS MODERATE 35: CPT

## 2018-08-06 PROCEDURE — 99233 SBSQ HOSP IP/OBS HIGH 50: CPT

## 2018-08-06 RX ORDER — DOCUSATE SODIUM 100 MG
100 CAPSULE ORAL THREE TIMES A DAY
Qty: 0 | Refills: 0 | Status: DISCONTINUED | OUTPATIENT
Start: 2018-08-06 | End: 2018-08-08

## 2018-08-06 RX ORDER — SENNA PLUS 8.6 MG/1
2 TABLET ORAL AT BEDTIME
Qty: 0 | Refills: 0 | Status: DISCONTINUED | OUTPATIENT
Start: 2018-08-06 | End: 2018-08-08

## 2018-08-06 RX ADMIN — ATORVASTATIN CALCIUM 80 MILLIGRAM(S): 80 TABLET, FILM COATED ORAL at 21:17

## 2018-08-06 RX ADMIN — ENOXAPARIN SODIUM 40 MILLIGRAM(S): 100 INJECTION SUBCUTANEOUS at 13:00

## 2018-08-06 RX ADMIN — NYSTATIN CREAM 1 APPLICATION(S): 100000 CREAM TOPICAL at 18:13

## 2018-08-06 RX ADMIN — Medication 81 MILLIGRAM(S): at 12:59

## 2018-08-06 RX ADMIN — ESCITALOPRAM OXALATE 10 MILLIGRAM(S): 10 TABLET, FILM COATED ORAL at 21:17

## 2018-08-06 RX ADMIN — Medication 1 APPLICATION(S): at 13:00

## 2018-08-06 RX ADMIN — PANTOPRAZOLE SODIUM 40 MILLIGRAM(S): 20 TABLET, DELAYED RELEASE ORAL at 08:04

## 2018-08-06 RX ADMIN — NYSTATIN CREAM 1 APPLICATION(S): 100000 CREAM TOPICAL at 05:33

## 2018-08-06 RX ADMIN — Medication 1 TABLET(S): at 12:59

## 2018-08-06 NOTE — PROGRESS NOTE ADULT - SUBJECTIVE AND OBJECTIVE BOX
HPI:  Ms. Naila Banegas is a 57 year old female with PMH of left breast CA s/p 6-week course of radiation, lumpectomy, depression, former smoker who presented to [facility] ED c/o sudden onset right upper and lower extremity weakness, slurred speech and facial droop which had started the evening prior to presentation. Patient initially went to sleep after onset of symptoms hoping they would resolve, but awoke to find they had worsened.  Upon presentation NIHSS = 5, CT head showed acute/subacute left thalamic infarct, outside of tPA window. MRI Head shows left thalamic infarct with extension to midbrain without hemorrhage. Carotid doppler showed no significant stenosis.  TTE showed normal LVEF, mild diastolic (stage I) dysfunction, without evidence of structural abnormality or thrombus.  Hypercoagulable workup was unrevealing.  Etiology of stroke thought cardioembolic. Recommended by cardiology to have ILR placed as outpatient to rule out occult arrhythmia.  Failed bedside swallow evaluation and placed on mechanical soft with thins.  Deemed medically stable for acute rehab on 7/25/18.    Upon evaluation patient states feels residual right sided weakness.  Endorses double vision with both eyes open which improves by closing left eye.  She wore glasses prior to stroke which improved vision but now vision is worse.  Denies headache, palpitations, numbness/tingling, bowel/bladder incontinence.  She is hungry and requesting something to eat. (25 Jul 2018 15:06)      PAST MEDICAL & SURGICAL HISTORY:  Breast cancer: s/p Lumpectomy  No significant past surgical history      Subjective:    REVIEW OF SYMPTOMS  [X] Constitutional WNL     [X] Cardio WNL            [X] Resp WNL           [X] GI WNL                      [X]  WNL                 [X] Heme WNL              [X] Endo WNL                  [X] Skin WNL               [X] MSK WNL            [X] Neuro WNL                  [X] Cognitive WNL        [X] Psych WNL      VITALS  Vital Signs Last 24 Hrs  T(C): 36.8 (05 Aug 2018 21:49), Max: 36.8 (05 Aug 2018 21:49)  T(F): 98.2 (05 Aug 2018 21:49), Max: 98.2 (05 Aug 2018 21:49)  HR: 67 (05 Aug 2018 21:49) (67 - 67)  BP: 96/65 (05 Aug 2018 21:49) (96/65 - 96/65)  BP(mean): --  RR: 16 (05 Aug 2018 21:49) (16 - 16)  SpO2: 92% (05 Aug 2018 21:49) (92% - 92%)      PHYSICAL EXAM  Constitutional - NAD, Comfortable  HEENT - NCAT, EOMI  Chest - CTA bilaterally, No wheeze, No rhonchi, No crackles  Cardiovascular - RRR, S1S2, No murmurs  Abdomen - BS+, Soft, NTND  Extremities - No edema, No calf tenderness   Neurologic Exam -                    Cognitive - Awake, Alert, AAO to self, place, date, year, situation     Communication - Fluent, No dysarthria     Cranial Nerves - CN 2-12 intact     Motor - No focal deficits                    LEFT    UE - ShAB 5/5, EF 5/5, EE 5/5, WE 5/5,  5/5                    RIGHT UE - ShAB 5/5, EF 5/5, EE 5/5, WE 5/5,  5/5                    LEFT    LE - HF 5/5, KE 5/5, DF 5/5, PF 5/5                    RIGHT LE - HF 5/5, KE 5/5, DF 5/5, PF 5/5        Sensory - Intact to LT     Reflexes - DTR Intact, No primitive reflexive     Coordination - FTN intact  Psychiatric - Mood stable, Affect WNL  Skin -   Wounds -    FUNCTIONAL PROGRESS  Gait - EVAL  ADLs - EVAL   Transfers - EVAL  Functional transfer - EVAL    RECENT LABS                        13.6   6.6   )-----------( 210      ( 06 Aug 2018 05:10 )             42.0     08-06    140  |  105  |  13  ----------------------------<  87  4.1   |  27  |  0.84    Ca    9.3      06 Aug 2018 05:10              RADIOLOGY/OTHER RESULTS      MEDICATIONS  (STANDING):  aspirin  chewable 81 milliGRAM(s) Oral daily  atorvastatin 80 milliGRAM(s) Oral at bedtime  docusate sodium 100 milliGRAM(s) Oral three times a day  enoxaparin Injectable 40 milliGRAM(s) SubCutaneous every 24 hours  escitalopram 10 milliGRAM(s) Oral at bedtime  hydrocortisone 1% Cream 1 Application(s) Topical daily  multivitamin 1 Tablet(s) Oral daily  nystatin Powder 1 Application(s) Topical two times a day  pantoprazole    Tablet 40 milliGRAM(s) Oral before breakfast  senna 2 Tablet(s) Oral at bedtime    MEDICATIONS  (PRN):  acetaminophen   Tablet 650 milliGRAM(s) Oral every 6 hours PRN For Temp greater than 38 C (100.4 F)  acetaminophen   Tablet. 650 milliGRAM(s) Oral every 6 hours PRN Mild Pain (1 - 3)  bisacodyl Suppository 10 milliGRAM(s) Rectal daily PRN Constipation  polyethylene glycol 3350 17 Gram(s) Oral daily PRN Constipation HPI:  Ms. Naila Banegas is a 57 year old female with PMH of left breast CA s/p 6-week course of radiation, lumpectomy, depression, former smoker who presented to [facility] ED c/o sudden onset right upper and lower extremity weakness, slurred speech and facial droop which had started the evening prior to presentation. Patient initially went to sleep after onset of symptoms hoping they would resolve, but awoke to find they had worsened.  Upon presentation NIHSS = 5, CT head showed acute/subacute left thalamic infarct, outside of tPA window. MRI Head shows left thalamic infarct with extension to midbrain without hemorrhage. Carotid doppler showed no significant stenosis.  TTE showed normal LVEF, mild diastolic (stage I) dysfunction, without evidence of structural abnormality or thrombus.  Hypercoagulable workup was unrevealing.  Etiology of stroke thought cardioembolic. Recommended by cardiology to have ILR placed as outpatient to rule out occult arrhythmia.  Failed bedside swallow evaluation and placed on mechanical soft with thins.  Deemed medically stable for acute rehab on 7/25/18.    Upon evaluation patient states feels residual right sided weakness.  Endorses double vision with both eyes open which improves by closing left eye.  She wore glasses prior to stroke which improved vision but now vision is worse.  Denies headache, palpitations, numbness/tingling, bowel/bladder incontinence.  She is hungry and requesting something to eat. (25 Jul 2018 15:06)      PAST MEDICAL & SURGICAL HISTORY:  Breast cancer: s/p Lumpectomy  No significant past surgical history      Subjective: No new complaints    REVIEW OF SYMPTOMS  X] Constitutional WNL     [X] Cardio WNL            [X] Resp WNL           [X] GI constipation                    [X]  WNL                 [X] Heme WNL              [X] Endo WNL                  [X] Skin--Psoarisis              [X] MSK--        [X] Neuro--right sensory deficit, diplopia                  [X] Cognitive WNL        [X] Psych-anxiety        VITALS  Vital Signs Last 24 Hrs  T(C): 36.8 (05 Aug 2018 21:49), Max: 36.8 (05 Aug 2018 21:49)  T(F): 98.2 (05 Aug 2018 21:49), Max: 98.2 (05 Aug 2018 21:49)  HR: 67 (05 Aug 2018 21:49) (67 - 67)  BP: 96/65 (05 Aug 2018 21:49) (96/65 - 96/65)  BP(mean): --  RR: 16 (05 Aug 2018 21:49) (16 - 16)  SpO2: 92% (05 Aug 2018 21:49) (92% - 92%)      PHYSICAL EXAM  Gen - NAD, Comfortable  	HEENT - NCAT, Left eye with impaired lateral movement and upward gaze, improving   	Neck - Supple, No limited ROM  	Pulm - CTAB, No wheeze, No rhonchi, No crackles  	Cardiovascular - RRR, S1S2, No murmurs  	Abdomen - Soft, NT/ND, +BS  	Extremities - No C/C/E, No calf tenderness  	Neuro-  	   Cognitive - AAOx3  	   Communication - Fluent, Dysarthria  	   Attention: Intact   	   Judgement: Good evidence of judgement  	   Memory: Recall 3 objects immediate and 3 min later      	   Cranial Nerves - Left CN VI palsy --improving--left eye depressed and medially deviated but upward and lateral movements gradually improving,  Impairment in upper visual field resolving,  diplopia when looking left or up  	   Motor - No focal deficits  	                  LEFT    UE - ShAB 5/5, EF 5/5, EE 5/5, WE 5/5,  5/5  	                  RIGHT UE - ShAB 5/5, EF 5/5, EE 5/5, WE 5/5,  5/5  	                  LEFT    LE - HF 5/5, KE 5/5, DF 5/5, PF 5/5  	                  RIGHT LE - HF 4+/5, KE 4+/5, DF 5/5, PF 5/5     	   Sensory - Impaired to light touch on right arm/leg   	   Reflexes - DTR Intact,  	   Coordination - FTN impaired with dysmetria b/l, HTS intact  	   Tone - normal  	Psychiatric - Mood stable, Affect WNL  	  FUNCTIONAL PROGRESS  Gait - amb 135 RW CG, 12 steps CG  ADLs - Sup exc. CG  transfers     RECENT LABS                        13.6   6.6   )-----------( 210      ( 06 Aug 2018 05:10 )             42.0     08-06    140  |  105  |  13  ----------------------------<  87  4.1   |  27  |  0.84    Ca    9.3      06 Aug 2018 05:10              RADIOLOGY/OTHER RESULTS      MEDICATIONS  (STANDING):  aspirin  chewable 81 milliGRAM(s) Oral daily  atorvastatin 80 milliGRAM(s) Oral at bedtime  docusate sodium 100 milliGRAM(s) Oral three times a day  enoxaparin Injectable 40 milliGRAM(s) SubCutaneous every 24 hours  escitalopram 10 milliGRAM(s) Oral at bedtime  hydrocortisone 1% Cream 1 Application(s) Topical daily  multivitamin 1 Tablet(s) Oral daily  nystatin Powder 1 Application(s) Topical two times a day  pantoprazole    Tablet 40 milliGRAM(s) Oral before breakfast  senna 2 Tablet(s) Oral at bedtime    MEDICATIONS  (PRN):  acetaminophen   Tablet 650 milliGRAM(s) Oral every 6 hours PRN For Temp greater than 38 C (100.4 F)  acetaminophen   Tablet. 650 milliGRAM(s) Oral every 6 hours PRN Mild Pain (1 - 3)  bisacodyl Suppository 10 milliGRAM(s) Rectal daily PRN Constipation  polyethylene glycol 3350 17 Gram(s) Oral daily PRN Constipation

## 2018-08-06 NOTE — PROGRESS NOTE ADULT - ASSESSMENT
56 y/o F with h/o breast cancer s/p lumpectomy presented with sudden onset right sided weakness, slurred speech found to have acute left thalamic CVA now with gait instability, ADL and functional impairments.       #Left thalamic CVA  - continue Rehab Program of PT/OT   - ASA, Statin, monitor BP  - ILR as outpatient to r/o occult arrhythmia     #Diplopia--Left abducens nerve palsy, improving   - Vision assessment and therapy in OT--May use eye patch during therapies for symptom control but avoid using eye patch throughout entire day.  - Will need neuro optometry assessment as outpatient.     #Dysphagia/dysarthria  - soft w/ thins  - SLP evaluation and treatment   - Aspiration precautions     #Constipation  - Colace, Senna, Miralax, add prune juice with meals and PRN suppository if ineffective     #Anxiety/depression   - Lexapro, Ativan PRN     #Precautions / PROPHYLAXIS:   - Falls, Cardiac, Aspiration   - Pressure injury/Skin: Turn Q2hrs while in bed   - DVT: Lovenox    #Diet: Mechanical soft w/thin liquids DASH/TLC. Will provide meal preferences      #Dispo: discussed in IDT meeting 7/31 - currently set up for self care, contact guard/min assist transfers, CG min Assist ambulation performing 12 steps min assist; Barriers include poor vision, impaired judgment, mild cognitive deficits. DC home with outpatient PT/OT/SLP on 8/8/18. Family/spouse training prior to DC.   will provide supervision upon discharge and plans to stay here in [place] with his wife as there are limited services where they live in [facility]. 58 y/o F with h/o breast cancer s/p lumpectomy presented with sudden onset right sided weakness, slurred speech found to have acute left thalamic CVA now with gait instability, ADL and functional impairments.       #Left thalamic CVA  - continue Rehab Program of PT/OT   - ASA, Statin, monitor BP  - ILR as outpatient to r/o occult arrhythmia     #Diplopia--Left abducens nerve palsy, improving   - Vision assessment and therapy in OT--May use eye patch during therapies for symptom control but avoid using eye patch throughout entire day.  - Will need neuro optometry assessment as outpatient.     #Dysphagia/dysarthria  - soft w/ thins  - SLP evaluation and treatment   - Aspiration precautions     #Constipation  - Colace, Senna--change to PRN as per pt. request,  PRN Miralax,  prune juice with meals     #Anxiety/depression   - Lexapro, Ativan PRN     #Precautions / PROPHYLAXIS:   - Falls, Cardiac, Aspiration   - Pressure injury/Skin: Turn Q2hrs while in bed   - DVT: Lovenox    #Diet: Mechanical soft w/thin liquids DASH/TLC. Will provide meal preferences      #Dispo: discussed in IDT meeting 7/31 - currently set up for self care, contact guard/min assist transfers, CG min Assist ambulation performing 12 steps min assist; Barriers include poor vision, impaired judgment, mild cognitive deficits. DC home with outpatient PT/OT/SLP on 8/8/18. Family/spouse training prior to DC.   will provide supervision upon discharge and plans to stay here in [place] with his wife as there are limited services where they live in [facility].

## 2018-08-06 NOTE — PROGRESS NOTE ADULT - ASSESSMENT
Pt is a 58yo F is admitted for Left thalamic CVA    #Left thalamic CVA  PT/OT/Speech therapy   BP control   continue ASA and statin   Fall precaution     #Left breast CA  outpatient follow-up    #Depression   Continue Lexapro     #dysphagia secondary to #1 above  continue with soft diet  actively being treated, follow-up speech therapy  aspiration precaution     #DDT ppx: Lovenox Pt is a 56yo F is admitted for Left thalamic CVA    #Left thalamic CVA  PT/OT/Speech therapy   BP control   continue ASA and statin   Fall precaution     #Left breast CA  outpatient follow-up    #Depression   Continue Lexapro     #dysphagia and #dysarthria secondary to #1 above  continue with soft diet  actively being treated, follow-up speech therapy  aspiration precaution     #DDT ppx: Lovenox

## 2018-08-06 NOTE — PROGRESS NOTE ADULT - SUBJECTIVE AND OBJECTIVE BOX
Patient is a 57y old  Female who presents with a chief complaint of left thalamic CVA (25 Jul 2018 15:06)      Patient seen and examined at bedside. Denies any acute complaints.    ALLERGIES:  penicillin (Unknown)    MEDICATIONS  (STANDING):  aspirin  chewable 81 milliGRAM(s) Oral daily  atorvastatin 80 milliGRAM(s) Oral at bedtime  enoxaparin Injectable 40 milliGRAM(s) SubCutaneous every 24 hours  escitalopram 10 milliGRAM(s) Oral at bedtime  hydrocortisone 1% Cream 1 Application(s) Topical daily  multivitamin 1 Tablet(s) Oral daily  nystatin Powder 1 Application(s) Topical two times a day  pantoprazole    Tablet 40 milliGRAM(s) Oral before breakfast    MEDICATIONS  (PRN):  acetaminophen   Tablet 650 milliGRAM(s) Oral every 6 hours PRN For Temp greater than 38 C (100.4 F)  acetaminophen   Tablet. 650 milliGRAM(s) Oral every 6 hours PRN Mild Pain (1 - 3)  docusate sodium 100 milliGRAM(s) Oral three times a day PRN Constipation  polyethylene glycol 3350 17 Gram(s) Oral daily PRN Constipation  senna 2 Tablet(s) Oral at bedtime PRN Constipation    Vital Signs Last 24 Hrs  T(F): 97.7 (06 Aug 2018 08:38), Max: 98.2 (05 Aug 2018 21:49)  HR: 76 (06 Aug 2018 08:38) (67 - 76)  BP: 122/64 (06 Aug 2018 08:38) (96/65 - 122/64)  RR: 15 (06 Aug 2018 08:38) (15 - 16)  SpO2: 95% (06 Aug 2018 08:38) (92% - 95%)  I&O's Summary    05 Aug 2018 07:01  -  06 Aug 2018 07:00  --------------------------------------------------------  IN: 0 mL / OUT: 3 mL / NET: -3 mL      PHYSICAL EXAM:  General: NAD, A/O x 3  ENT: MMM  Neck: Supple, No JVD  Lungs: Clear to auscultation bilaterally  Cardio: RRR, S1/S2, No murmurs  Abdomen: Soft, Nontender, Nondistended; Bowel sounds present  Extremities: No calf tenderness, No pitting edema  Neuro: Slow-speech with some slurring    LABS:                        13.6   6.6   )-----------( 210      ( 06 Aug 2018 05:10 )             42.0     08-06    140  |  105  |  13  ----------------------------<  87  4.1   |  27  |  0.84    Ca    9.3      06 Aug 2018 05:10      eGFR if Non African American: 76 mL/min/1.73M2 (08-06-18 @ 05:10)  eGFR if African American: 88 mL/min/1.73M2 (08-06-18 @ 05:10)            07-18 Chol 166 mg/dL  mg/dL HDL 34 mg/dL Trig 151 mg/dL        CAPILLARY BLOOD GLUCOSE        07-18 [id number] 5.7      Care Discussed with Consultants/Other Providers: Dr. Toscano

## 2018-08-07 ENCOUNTER — TRANSCRIPTION ENCOUNTER (OUTPATIENT)
Age: 58
End: 2018-08-07

## 2018-08-07 PROCEDURE — 99232 SBSQ HOSP IP/OBS MODERATE 35: CPT

## 2018-08-07 RX ORDER — ESCITALOPRAM OXALATE 10 MG/1
1 TABLET, FILM COATED ORAL
Qty: 30 | Refills: 0 | OUTPATIENT
Start: 2018-08-07 | End: 2018-09-05

## 2018-08-07 RX ORDER — PANTOPRAZOLE SODIUM 20 MG/1
1 TABLET, DELAYED RELEASE ORAL
Qty: 30 | Refills: 0 | OUTPATIENT
Start: 2018-08-07 | End: 2018-09-05

## 2018-08-07 RX ORDER — ATORVASTATIN CALCIUM 80 MG/1
1 TABLET, FILM COATED ORAL
Qty: 30 | Refills: 0 | OUTPATIENT
Start: 2018-08-07 | End: 2018-09-05

## 2018-08-07 RX ORDER — ESCITALOPRAM OXALATE 10 MG/1
1 TABLET, FILM COATED ORAL
Qty: 0 | Refills: 0 | COMMUNITY
Start: 2018-08-07

## 2018-08-07 RX ORDER — NYSTATIN CREAM 100000 [USP'U]/G
1 CREAM TOPICAL
Qty: 60 | Refills: 1 | OUTPATIENT
Start: 2018-08-07

## 2018-08-07 RX ORDER — ACETAMINOPHEN 500 MG
2 TABLET ORAL
Qty: 0 | Refills: 0 | COMMUNITY
Start: 2018-08-07

## 2018-08-07 RX ADMIN — PANTOPRAZOLE SODIUM 40 MILLIGRAM(S): 20 TABLET, DELAYED RELEASE ORAL at 06:16

## 2018-08-07 RX ADMIN — Medication 81 MILLIGRAM(S): at 12:47

## 2018-08-07 RX ADMIN — Medication 1 TABLET(S): at 12:47

## 2018-08-07 RX ADMIN — ESCITALOPRAM OXALATE 10 MILLIGRAM(S): 10 TABLET, FILM COATED ORAL at 21:23

## 2018-08-07 RX ADMIN — ATORVASTATIN CALCIUM 80 MILLIGRAM(S): 80 TABLET, FILM COATED ORAL at 21:23

## 2018-08-07 RX ADMIN — Medication 1 APPLICATION(S): at 12:48

## 2018-08-07 RX ADMIN — ENOXAPARIN SODIUM 40 MILLIGRAM(S): 100 INJECTION SUBCUTANEOUS at 14:01

## 2018-08-07 NOTE — DISCHARGE NOTE ADULT - NS MD DC FALL RISK RISK
English For information on Fall & Injury Prevention, visit [email or web address]

## 2018-08-07 NOTE — DISCHARGE NOTE ADULT - PATIENT PORTAL LINK FT
You can access the [facility] Patient Portal, offered by [facility], by registering with the following website: [email or web address]

## 2018-08-07 NOTE — DISCHARGE NOTE ADULT - MEDICATION SUMMARY - MEDICATIONS TO TAKE
I will START or STAY ON the medications listed below when I get home from the hospital:    aspirin 81 mg oral tablet, chewable  -- 1 tab(s) by mouth once a day  -- Indication: For heart protection     acetaminophen 325 mg oral tablet  -- 2 tab(s) by mouth every 6 hours, As needed, Mild Pain (1 - 3)  -- Indication: For pain mangement     escitalopram 10 mg oral tablet  -- 1 tab(s) by mouth once a day (at bedtime)  -- Indication: For Moderate episode of recurrent major depressive disorder    atorvastatin 80 mg oral tablet  -- 1 tab(s) by mouth once a day (at bedtime)  -- Indication: For Thalamic infarct, acute    nystatin 100,000 units/g topical powder  -- 1 application on skin 2 times a day  -- Indication: For groin rash    pantoprazole 40 mg oral delayed release tablet  -- 1 tab(s) by mouth once a day (before a meal)  -- Indication: For GI protection     Multiple Vitamins oral tablet  -- 1 tab(s) by mouth once a day  -- Indication: For supplement

## 2018-08-07 NOTE — DISCHARGE NOTE ADULT - CARE PROVIDERS DIRECT ADDRESSES
[email or web address]

## 2018-08-07 NOTE — DISCHARGE NOTE ADULT - HOSPITAL COURSE
Ms. Naila Banegas is a 57 year old female with PMH of left breast CA s/p 6-week course of radiation, lumpectomy, depression, former smoker who presented to [facility] ED c/o sudden onset right upper and lower extremity weakness, slurred speech and facial droop which had started the evening prior to presentation. Patient initially went to sleep after onset of symptoms hoping they would resolve, but awoke to find they had worsened.  Upon presentation NIHSS = 5, CT head showed acute/subacute left thalamic infarct, outside of tPA window. MRI Head shows left thalamic infarct with extension to midbrain without hemorrhage. Carotid doppler showed no significant stenosis.  TTE showed normal LVEF, mild diastolic (stage I) dysfunction, without evidence of structural abnormality or thrombus.  Hypercoagulable workup was unrevealing.  Etiology of stroke thought cardioembolic. Recommended by cardiology to have ILR placed as outpatient to rule out occult arrhythmia.  Failed bedside swallow evaluation and placed on mechanical soft with thins.  Deemed medically stable for acute rehab on 7/25/18.    Pt remained stable during hospitalization here at [facility] and made functional gains in PT, OT and ST. At time of discharge, patient is mod I for hygiene supervision for transfers, transferring with supervision. Ambulating with RW supervision. Ms. Naila Banegas is a 57 year old female with PMH of left breast CA s/p 6-week course of radiation, lumpectomy, depression, former smoker who presented to [facility] ED c/o sudden onset right upper and lower extremity weakness, slurred speech and facial droop which had started the evening prior to presentation. Patient initially went to sleep after onset of symptoms hoping they would resolve, but awoke to find they had worsened.  Upon presentation NIHSS = 5, CT head showed acute/subacute left thalamic infarct, outside of tPA window. MRI Head shows left thalamic infarct with extension to midbrain without hemorrhage. Carotid doppler showed no significant stenosis.  TTE showed normal LVEF, mild diastolic (stage I) dysfunction, without evidence of structural abnormality or thrombus.  Hypercoagulable workup was unrevealing.  Etiology of stroke thought cardioembolic. Recommended by cardiology to have ILR placed as outpatient to rule out occult arrhythmia.  Failed bedside swallow evaluation and placed on mechanical soft with thins.  Deemed medically stable for acute rehab on 7/25/18.    Pt remained stable during hospitalization here at [facility] and made functional gains in PT, OT and ST. At time of discharge, patient is mod I for hygiene supervision for transfers, transferring with supervision. Ambulating with  ft with CG. Navigating 12 steps with CG.

## 2018-08-07 NOTE — DISCHARGE NOTE ADULT - PLAN OF CARE
prevent future strokes and continue to gain strength, coordination and improve cognitive performance Continue outpatient therapies (speech, occupational and physical therapy)   Continue lipitor to control cholesterol.   Continue low cholesterol/fat diet.  Follow up with neurologist in 1-2 weeks.   Follow up with cardiac electrophysiologist for loop recorder placement to evaluate for heart arrythmia. mood to remain stable continue lexapro 10mg at bedtime and follow up with PCP. improved vision make appointment with neuro optometrist (list of providers given to patient and ) improve swallowing, prevent aspiration Continue soft diet with thin liquids at home. Continue speech therapy as an outpatient.

## 2018-08-07 NOTE — DISCHARGE NOTE ADULT - MEDICATION SUMMARY - MEDICATIONS TO STOP TAKING
I will STOP taking the medications listed below when I get home from the hospital:    LORazepam 0.5 mg oral tablet  -- 1 tab(s) by mouth every 6 hours, As needed, Anxiety MDD:2mg    hydrocortisone 1% topical cream  -- 1 application on skin once a day    citalopram 20 mg oral tablet  -- 1 tab(s) by mouth once a day (at bedtime)

## 2018-08-07 NOTE — DISCHARGE NOTE ADULT - NS AS ACTIVITY OBS
Showering allowed/Do not make important decisions/Do not drive or operate machinery/Walking-Indoors allowed/Walking-Outdoors allowed/Stairs allowed/No Heavy lifting/straining

## 2018-08-07 NOTE — DISCHARGE NOTE ADULT - ADDITIONAL INSTRUCTIONS
Follow up with primary care provider in 1 week.   Follow up with cardiac electophysiologist and neurologist in 1-2 weeks.   Follow up with Dr Toscano at [facility] in 3-4 weeks.

## 2018-08-07 NOTE — PROGRESS NOTE ADULT - SUBJECTIVE AND OBJECTIVE BOX
HPI:  Ms. Naila Banegas is a 57 year old female with PMH of left breast CA s/p 6-week course of radiation, lumpectomy, depression, former smoker who presented to [facility] ED c/o sudden onset right upper and lower extremity weakness, slurred speech and facial droop which had started the evening prior to presentation. Patient initially went to sleep after onset of symptoms hoping they would resolve, but awoke to find they had worsened.  Upon presentation NIHSS = 5, CT head showed acute/subacute left thalamic infarct, outside of tPA window. MRI Head shows left thalamic infarct with extension to midbrain without hemorrhage. Carotid doppler showed no significant stenosis.  TTE showed normal LVEF, mild diastolic (stage I) dysfunction, without evidence of structural abnormality or thrombus.  Hypercoagulable workup was unrevealing.  Etiology of stroke thought cardioembolic. Recommended by cardiology to have ILR placed as outpatient to rule out occult arrhythmia.  Failed bedside swallow evaluation and placed on mechanical soft with thins.  Deemed medically stable for acute rehab on 7/25/18.    Upon evaluation patient states feels residual right sided weakness.  Endorses double vision with both eyes open which improves by closing left eye.  She wore glasses prior to stroke which improved vision but now vision is worse.  Denies headache, palpitations, numbness/tingling, bowel/bladder incontinence.  She is hungry and requesting something to eat. (25 Jul 2018 15:06)      PAST MEDICAL & SURGICAL HISTORY:  Breast cancer: s/p Lumpectomy  No significant past surgical history      Subjective: No new complaints. Doing well. Looking forward to discharge home tomorrow. Reports that she will be following up with cardiology and neurology at [facility]. Pt and  live in [facility] and will not have access to specialty doctors in their home town.     REVIEW OF SYMPTOMS  X] Constitutional WNL     [X] Cardio WNL            [X] Resp WNL           [X] GI constipation                    [X]  WNL                 [X] Heme WNL              [X] Endo WNL                  [X] Skin--Psoarisis              [X] MSK--        [X] Neuro--right sensory deficit, diplopia                  [X] Cognitive WNL        [X] Psych-anxiety        VITALS  Vital Signs Last 24 Hrs  T(C): 36.6 (07 Aug 2018 08:46), Max: 37 (06 Aug 2018 14:38)  T(F): 97.8 (07 Aug 2018 08:46), Max: 98.6 (06 Aug 2018 14:38)  HR: 74 (07 Aug 2018 08:46) (74 - 78)  BP: 122/80 (07 Aug 2018 08:46) (95/59 - 132/68)  RR: 16 (07 Aug 2018 08:46) (14 - 16)  SpO2: 94% (07 Aug 2018 08:46) (94% - 96%)    PHYSICAL EXAM  Gen - NAD, Comfortable  	HEENT - NCAT, Left eye with impaired lateral movement and upward gaze, improving   	Pulm - CTAB  	Cardiovascular - RRR, S1S2, No murmurs  	Abdomen - Soft, NT/ND, +BS  	Extremities - No C/C/E, No calf tenderness  	Neuro-  	   Cognitive - AAOx3  	   Communication - Fluent, Dysarthria  	   Attention: Intact   	   Motor - No focal deficits  	                  LEFT    UE - ShAB 5/5, EF 5/5, EE 5/5, WE 5/5,  5/5  	                  RIGHT UE - ShAB 5/5, EF 5/5, EE 5/5, WE 5/5,  5/5  	                  LEFT    LE - HF 5/5, KE 5/5, DF 5/5, PF 5/5  	                  RIGHT LE - HF 4+/5, KE 4+/5, DF 5/5, PF 5/5     	   Tone - normal  	Psychiatric - Mood stable, Affect WNL  	  FUNCTIONAL PROGRESS  Gait - amb 135 RW CG, 12 steps CG  ADLs - Sup exc. CG  transfers     RECENT LABS                        13.6   6.6   )-----------( 210      ( 06 Aug 2018 05:10 )             42.0     08-06    140  |  105  |  13  ----------------------------<  87  4.1   |  27  |  0.84    Ca    9.3      06 Aug 2018 05:10              RADIOLOGY/OTHER RESULTS    MEDICATIONS  (STANDING):  aspirin  chewable 81 milliGRAM(s) Oral daily  atorvastatin 80 milliGRAM(s) Oral at bedtime  enoxaparin Injectable 40 milliGRAM(s) SubCutaneous every 24 hours  escitalopram 10 milliGRAM(s) Oral at bedtime  hydrocortisone 1% Cream 1 Application(s) Topical daily  multivitamin 1 Tablet(s) Oral daily  nystatin Powder 1 Application(s) Topical two times a day  pantoprazole    Tablet 40 milliGRAM(s) Oral before breakfast    MEDICATIONS  (PRN):  acetaminophen   Tablet 650 milliGRAM(s) Oral every 6 hours PRN For Temp greater than 38 C (100.4 F)  acetaminophen   Tablet. 650 milliGRAM(s) Oral every 6 hours PRN Mild Pain (1 - 3)  docusate sodium 100 milliGRAM(s) Oral three times a day PRN Constipation  polyethylene glycol 3350 17 Gram(s) Oral daily PRN Constipation  senna 2 Tablet(s) Oral at bedtime PRN Constipation

## 2018-08-07 NOTE — DISCHARGE NOTE ADULT - CARE PLAN
Principal Discharge DX:	Thalamic infarct, acute  Goal:	prevent future strokes and continue to gain strength, coordination and improve cognitive performance  Assessment and plan of treatment:	Continue outpatient therapies (speech, occupational and physical therapy)   Continue lipitor to control cholesterol.   Continue low cholesterol/fat diet.  Follow up with neurologist in 1-2 weeks.   Follow up with cardiac electrophysiologist for loop recorder placement to evaluate for heart arrythmia.  Secondary Diagnosis:	Moderate episode of recurrent major depressive disorder  Goal:	mood to remain stable  Assessment and plan of treatment:	continue lexapro 10mg at bedtime and follow up with PCP.  Secondary Diagnosis:	Diplopia  Goal:	improved vision  Assessment and plan of treatment:	make appointment with neuro optometrist (list of providers given to patient and )  Secondary Diagnosis:	Dysphagia  Goal:	improve swallowing, prevent aspiration  Assessment and plan of treatment:	Continue soft diet with thin liquids at home. Continue speech therapy as an outpatient.

## 2018-08-07 NOTE — DISCHARGE NOTE ADULT - CARE PROVIDER_API CALL
Arabella Baeza), Cardiac Electrophysiology; Cardiology; Internal Medicine  [id number] [place] [id number]  Phone: [phone]  Fax: [phone]    Holly Turner (MD), Neurology  [address] [facility]  2nd [place] [address] [place] [address]  Phone: [phone]  Fax: [phone]    Leidy Toscano (), Brain Injury Medicine; PhysicalRehab Medicine  [address] [place] [address]  Phone: [phone]  Fax: [phone]

## 2018-08-08 VITALS
OXYGEN SATURATION: 96 % | TEMPERATURE: 98 F | HEART RATE: 72 BPM | RESPIRATION RATE: 14 BRPM | SYSTOLIC BLOOD PRESSURE: 94 MMHG | DIASTOLIC BLOOD PRESSURE: 61 MMHG

## 2018-08-08 PROCEDURE — 80048 BASIC METABOLIC PNL TOTAL CA: CPT

## 2018-08-08 PROCEDURE — 92610 EVALUATE SWALLOWING FUNCTION: CPT

## 2018-08-08 PROCEDURE — 97530 THERAPEUTIC ACTIVITIES: CPT

## 2018-08-08 PROCEDURE — 97110 THERAPEUTIC EXERCISES: CPT

## 2018-08-08 PROCEDURE — 85027 COMPLETE CBC AUTOMATED: CPT

## 2018-08-08 PROCEDURE — 92523 SPEECH SOUND LANG COMPREHEN: CPT

## 2018-08-08 PROCEDURE — 92507 TX SP LANG VOICE COMM INDIV: CPT

## 2018-08-08 PROCEDURE — 84134 ASSAY OF PREALBUMIN: CPT

## 2018-08-08 PROCEDURE — 97167 OT EVAL HIGH COMPLEX 60 MIN: CPT

## 2018-08-08 PROCEDURE — 92526 ORAL FUNCTION THERAPY: CPT

## 2018-08-08 PROCEDURE — 93005 ELECTROCARDIOGRAM TRACING: CPT

## 2018-08-08 PROCEDURE — 97116 GAIT TRAINING THERAPY: CPT

## 2018-08-08 PROCEDURE — 80053 COMPREHEN METABOLIC PANEL: CPT

## 2018-08-08 PROCEDURE — 97535 SELF CARE MNGMENT TRAINING: CPT

## 2018-08-08 PROCEDURE — 97112 NEUROMUSCULAR REEDUCATION: CPT

## 2018-08-08 PROCEDURE — 99238 HOSP IP/OBS DSCHRG MGMT 30/<: CPT

## 2018-08-08 PROCEDURE — 97163 PT EVAL HIGH COMPLEX 45 MIN: CPT

## 2018-08-08 RX ADMIN — PANTOPRAZOLE SODIUM 40 MILLIGRAM(S): 20 TABLET, DELAYED RELEASE ORAL at 06:04

## 2018-08-08 NOTE — PROGRESS NOTE ADULT - SUBJECTIVE AND OBJECTIVE BOX
HPI:  Ms. Naila Banegas is a 57 year old female with PMH of left breast CA s/p 6-week course of radiation, lumpectomy, depression, former smoker who presented to [facility] ED c/o sudden onset right upper and lower extremity weakness, slurred speech and facial droop which had started the evening prior to presentation. Patient initially went to sleep after onset of symptoms hoping they would resolve, but awoke to find they had worsened.  Upon presentation NIHSS = 5, CT head showed acute/subacute left thalamic infarct, outside of tPA window. MRI Head shows left thalamic infarct with extension to midbrain without hemorrhage. Carotid doppler showed no significant stenosis.  TTE showed normal LVEF, mild diastolic (stage I) dysfunction, without evidence of structural abnormality or thrombus.  Hypercoagulable workup was unrevealing.  Etiology of stroke thought cardioembolic. Recommended by cardiology to have ILR placed as outpatient to rule out occult arrhythmia.  Failed bedside swallow evaluation and placed on mechanical soft with thins.  Deemed medically stable for acute rehab on 7/25/18.    Upon evaluation patient states feels residual right sided weakness.  Endorses double vision with both eyes open which improves by closing left eye.  She wore glasses prior to stroke which improved vision but now vision is worse.  Denies headache, palpitations, numbness/tingling, bowel/bladder incontinence.  She is hungry and requesting something to eat. (25 Jul 2018 15:06)      PAST MEDICAL & SURGICAL HISTORY:  Breast cancer: s/p Lumpectomy  No significant past surgical history      Subjective: No new complaints. Doing well. Ready for discharge to home today.      REVIEW OF SYMPTOMS  X] Constitutional WNL     [X] Cardio WNL            [X] Resp WNL           [X] GI constipation                    [X]  WNL                 [X] Heme WNL              [X] Endo WNL                  [X] Skin--Psoarisis              [X] MSK--        [X] Neuro--right sensory deficit, diplopia                  [X] Cognitive WNL        [X] Psych-anxiety        VITALS  Vital Signs Last 24 Hrs  T(C): 36.5 (08 Aug 2018 08:08), Max: 36.8 (07 Aug 2018 21:21)  T(F): 97.7 (08 Aug 2018 08:08), Max: 98.2 (07 Aug 2018 21:21)  HR: 72 (08 Aug 2018 08:08) (72 - 80)  BP: 94/61 (08 Aug 2018 08:08) (94/61 - 98/61)  RR: 14 (08 Aug 2018 08:08) (14 - 14)  SpO2: 96% (08 Aug 2018 08:08) (92% - 96%)    PHYSICAL EXAM  Gen - NAD, Comfortable  	HEENT - NCAT, Left eye with impaired lateral movement and upward gaze, improving   	Pulm - CTAB  	Cardiovascular - RRR, S1S2, No murmurs  	Abdomen - Soft, NT/ND, +BS  	Extremities - No C/C/E, No calf tenderness  	Neuro-  	   Cognitive - AAOx3  	   Communication - Fluent, Dysarthria  	   Attention: Intact   	   Motor - No focal deficits  	                  LEFT    UE - ShAB 5/5, EF 5/5, EE 5/5, WE 5/5,  5/5  	                  RIGHT UE - ShAB 5/5, EF 5/5, EE 5/5, WE 5/5,  5/5  	                  LEFT    LE - HF 5/5, KE 5/5, DF 5/5, PF 5/5  	                  RIGHT LE - HF 4+/5, KE 4+/5, DF 5/5, PF 5/5     	   Tone - normal  	Psychiatric - Mood stable, Affect WNL  	  FUNCTIONAL PROGRESS  Gait - amb 150 RW CG, 12 steps CG  ADLs - Sup exc. CG  transfers     RECENT LABS                        13.6   6.6   )-----------( 210      ( 06 Aug 2018 05:10 )             42.0     08-06    140  |  105  |  13  ----------------------------<  87  4.1   |  27  |  0.84    Ca    9.3      06 Aug 2018 05:10              RADIOLOGY/OTHER RESULTS    MEDICATIONS  (STANDING):  aspirin  chewable 81 milliGRAM(s) Oral daily  atorvastatin 80 milliGRAM(s) Oral at bedtime  enoxaparin Injectable 40 milliGRAM(s) SubCutaneous every 24 hours  escitalopram 10 milliGRAM(s) Oral at bedtime  hydrocortisone 1% Cream 1 Application(s) Topical daily  multivitamin 1 Tablet(s) Oral daily  nystatin Powder 1 Application(s) Topical two times a day  pantoprazole    Tablet 40 milliGRAM(s) Oral before breakfast    MEDICATIONS  (PRN):  acetaminophen   Tablet 650 milliGRAM(s) Oral every 6 hours PRN For Temp greater than 38 C (100.4 F)  acetaminophen   Tablet. 650 milliGRAM(s) Oral every 6 hours PRN Mild Pain (1 - 3)  docusate sodium 100 milliGRAM(s) Oral three times a day PRN Constipation  polyethylene glycol 3350 17 Gram(s) Oral daily PRN Constipation  senna 2 Tablet(s) Oral at bedtime PRN Constipation

## 2018-08-08 NOTE — PROGRESS NOTE ADULT - ATTENDING COMMENTS
Pt. seen.  Agree with documentation above as per resident with amendments made as appropriate. Patient medically stable.
Pt. seen.  Agree with documentation above as per resident. Patient medically stable. Tolerating therapy. T Making progress towards rehab goals. d/c planning 8/8/18
Pt. seen.  Agree with documentation above as per resident. Patient medically stable. Tolerating therapy. Team rounds--function and barriers as noted above.  Making progress towards rehab goals. d/c planning 8/8/18
Pt. seen.  Agree with documentation above as per  NP with amendments made as appropriate. Patient medically stable. Making progress towards rehab goals. Discharge home tomorrow
Pt. seen.  Agree with documentation above as per NP with amendments. Medically stable for discharge home today.  Discharge medications and instructions reviewed with patient and patient's .  All questions answered.
Pt. seen.  Agree with documentation above as per resident. Patient medically stable. Tolerating therapy.  Making progress towards rehab goals. Left lateral eye palsy improving.  d/c planning 8/8/18

## 2018-08-08 NOTE — PROGRESS NOTE ADULT - PROVIDER SPECIALTY LIST ADULT
Hospitalist
Neurology
Neurology
Rehab Medicine

## 2018-08-08 NOTE — PROGRESS NOTE ADULT - ASSESSMENT
56 y/o F with h/o breast cancer s/p lumpectomy presented with sudden onset right sided weakness, slurred speech found to have acute left thalamic CVA now with gait instability, ADL and functional impairments.       #Left thalamic CVA  - continue Rehab Program of PT/OT/speech---will f/u with outpatient therapy in [place] after discharge.   - ASA, Statin, monitor BP  - ILR as outpatient to r/o occult arrhythmia     #Diplopia--Left abducens nerve palsy, improving   - Vision assessment and therapy in OT--May use eye patch during therapies for symptom control but avoid using eye patch throughout entire day.  - Will need neuro optometry assessment as outpatient-patient and  have received list of neuro optometrists and will call to make appointment.     #Dysphagia/dysarthria  - soft w/ thins  - SLP evaluation and treatment   - Aspiration precautions     #Constipation  - Colace, Senna--change to PRN as per pt. request,  PRN Miralax,  prune juice with meals     #Anxiety/depression   - Lexapro, has not needed prn Ativan, mood stable     #Precautions / PROPHYLAXIS:   - Falls, Cardiac, Aspiration   - Pressure injury/Skin: Turn Q2hrs while in bed   - DVT: Lovenox    #Diet: Mechanical soft w/thin liquids DASH/TLC.     #Dispo: Patient is medically stable for discharge home today with . 58 y/o F with h/o breast cancer s/p lumpectomy presented with sudden onset right sided weakness, slurred speech found to have acute left thalamic CVA now with gait instability, ADL and functional impairments.       #Left thalamic CVA  Medically stable for discharge home today.   - continue Rehab Program of PT/OT/speech---will f/u with outpatient therapy in [facility] after discharge.   - ASA, Statin, monitor BP  - ILR as outpatient to r/o occult arrhythmia     #Diplopia--Left abducens nerve palsy, improving   - Vision assessment and therapy in OT--May use eye patch during therapies for symptom control but avoid using eye patch throughout entire day.  - Will need neuro optometry assessment as outpatient-patient and  have received list of neuro optometrists and will call to make appointment.     #Dysphagia/dysarthria  - soft w/ thins  - SLP evaluation and treatment       #Anxiety/depression   - Lexapro,       #Dispo: Patient is medically stable for discharge home today with .

## 2019-04-10 ENCOUNTER — OUTPATIENT (OUTPATIENT)
Dept: OUTPATIENT SERVICES | Facility: HOSPITAL | Age: 59
LOS: 1 days | End: 2019-04-10
Payer: COMMERCIAL

## 2019-04-10 VITALS
SYSTOLIC BLOOD PRESSURE: 122 MMHG | DIASTOLIC BLOOD PRESSURE: 67 MMHG | HEART RATE: 87 BPM | OXYGEN SATURATION: 97 % | HEIGHT: 64 IN | TEMPERATURE: 98 F | RESPIRATION RATE: 18 BRPM | WEIGHT: 214.95 LBS

## 2019-04-10 DIAGNOSIS — I66.9 OCCLUSION AND STENOSIS OF UNSPECIFIED CEREBRAL ARTERY: ICD-10-CM

## 2019-04-10 DIAGNOSIS — Z90.49 ACQUIRED ABSENCE OF OTHER SPECIFIED PARTS OF DIGESTIVE TRACT: Chronic | ICD-10-CM

## 2019-04-10 DIAGNOSIS — Z98.890 OTHER SPECIFIED POSTPROCEDURAL STATES: Chronic | ICD-10-CM

## 2019-04-10 DIAGNOSIS — Z90.89 ACQUIRED ABSENCE OF OTHER ORGANS: Chronic | ICD-10-CM

## 2019-04-10 PROCEDURE — 93010 ELECTROCARDIOGRAM REPORT: CPT

## 2019-04-10 PROCEDURE — 33285 INSJ SUBQ CAR RHYTHM MNTR: CPT

## 2019-04-10 PROCEDURE — 93005 ELECTROCARDIOGRAM TRACING: CPT

## 2019-04-10 PROCEDURE — C1764: CPT

## 2019-04-10 RX ORDER — CITALOPRAM 10 MG/1
1 TABLET, FILM COATED ORAL
Qty: 0 | Refills: 0 | COMMUNITY

## 2019-04-10 NOTE — H&P CARDIOLOGY - PMH
Breast cancer  s/p Lumpectomy  Cerebrovascular accident (CVA) due to bilateral stenosis of vertebral arteries    Current severe episode of major depressive disorder without psychotic features without prior episode    Gastroesophageal reflux disease, esophagitis presence not specified

## 2019-04-10 NOTE — H&P CARDIOLOGY - HISTORY OF PRESENT ILLNESS
58 yr old  female with PMHx of CVA, Depression, GERD recently suffered a stroke with multiple areas of infarct on MRI. Pt reports difficulty swallowing and has had one episode of choking and almost required Hemlich manuever. Pt was seen by Speech and swallow with negative results.  Pt was referred for Loop recorder by Dr Mercer.

## 2019-04-10 NOTE — PROGRESS NOTE ADULT - SUBJECTIVE AND OBJECTIVE BOX
EP Brief Operative Note    Diagnosis: stroke r/o afib  Procedure: ILR implant  Surgeon: Jony South M.D.  Findings: none  EBL: minimal  Specimens: none  Post-op Diagnosis: stroke r/o afib  Assistants: none      A/P) successful medtronic ILR implant, no acute complications    -d/c home  -no changes to home meds  -no further antibiotics needed  -f/u with me for wound check on Thu May 23 at 11:20am      Jony South M.D., [facility]  Cardiac Electrophysiology  [facility]  [address] [place] [address], [place] [address]  [email or web address]    office [phone]  pager [phone]

## 2019-06-30 ENCOUNTER — TRANSCRIPTION ENCOUNTER (OUTPATIENT)
Age: 59
End: 2019-06-30

## 2020-02-21 NOTE — PROGRESS NOTE ADULT - SUBJECTIVE AND OBJECTIVE BOX
"Patient Name: Brenda Brown   YOB: 1952  Referring Primary Care Physician: Lisa Camargo MD  BMI: Body mass index is 22.63 kg/m².    Chief Complaint:    Chief Complaint   Patient presents with   • Left Knee - Establish Care, Pain        HPI:     Brenda Brown is a 67 y.o. female who presents today for evaluation of   Chief Complaint   Patient presents with   • Left Knee - Establish Care, Pain   . Atraumatic chronic worsening achy left clara pain.  Night.  Tylenol.  Not supposed to take nsaids \"because of heart\"    This problem is new to this examiner.     Subjective   Medications:   Home Medications:  Current Outpatient Medications on File Prior to Visit   Medication Sig   • Alcohol Swabs (PHARMACIST CHOICE ALCOHOL) pads 2 (Two) Times a Day. as directed   • alendronate (FOSAMAX) 70 MG tablet TAKE 1 (ONE) TABLET WEEKLY BEFORE BREAKFAST   • aspirin 325 MG tablet Take 325 mg by mouth Daily.   • BIOTIN PO Take  by mouth Daily.   • Calcium 500 MG tablet Take 600 mg by mouth 4 (Four) Times a Day.   • Cholecalciferol (VITAMIN D-1000 MAX ST) 25 MCG (1000 UT) tablet Take 1,000 Units by mouth Daily.   • citalopram (CeleXA) 10 MG tablet TAKE 3 TABLETS (30 MG) BY MOUTH ONCE DAILY DOSE INCREASE FROM 20 MG   • cyanocobalamin ([employer] VITAMIN B-12) 1000 MCG tablet Take  by mouth.   • ENULOSE 10 GM/15ML solution solution (encephalopathy) TAKE 1-2 TABLESPOONS (15-30 ML'S) 2-3 TIMES DAILY AS NEEDED   • fexofenadine (ALLEGRA) 180 MG tablet Take  by mouth.   • fluticasone (FLONASE) 50 MCG/ACT nasal spray INHALE 2 (TWO) SPRAYS IN EACH NOSTRIL ONCE DAILY   • FREESTYLE LITE test strip 2 (Two) Times a Day. use for testing   • glucose blood (ONE TOUCH ULTRA TEST) test strip TEST TWICE DAILY AS DIRECTED   • guaiFENesin 200 MG tablet Take 400 mg by mouth.   • ketotifen (ZADITOR) 0.025 % ophthalmic solution Apply  to eye(s) as directed by provider.   • lactulose (CHRONULAC) 10 GM/15ML solution    • Lancet Devices (SIMPLE " DIAGNOSTICS LANCING DEV) misc 2 (Two) Times a Day. as directed   • levothyroxine (SYNTHROID, LEVOTHROID) 88 MCG tablet TAKE 1 (ONE) TABLET BY MOUTH ONCE DAILY ON EMPTY STOMACH   • magnesium chloride ER 64 MG DR tablet Take  by mouth.   • olopatadine (PATADAY) 0.2 % solution ophthalmic solution Apply 1 drop to eye(s) as directed by provider.   • Omega-3 Fatty Acids (FISH OIL PEARLS) 180 MG capsule Take  by mouth.   • omeprazole (priLOSEC) 20 MG capsule TAKE 1 (ONE) CAPSULE BY MOUTH ONCE DAILY   • PHARMACIST CHOICE LANCETS misc 2 (Two) Times a Day.   • pravastatin (PRAVACHOL) 10 MG tablet TAKE 1 (ONE) TABLET BY MOUTH EACH NIGHT AT BEDTIME   • PROBIOTIC PRODUCT PO Take 1 tablet by mouth Daily.   • tiZANidine (ZANAFLEX) 4 MG tablet TAKE 1/2 TABLET (2 MG) BY MOUTH AS NEEDED   • TRADJENTA 5 MG tablet tablet Take 5 mg by mouth Daily.   • Unable to find 400 mcg.     No current facility-administered medications on file prior to visit.      Current Medications:  Scheduled Meds:  Continuous Infusions:  No current facility-administered medications for this visit.   PRN Meds:.    I have reviewed the patient's medical history in detail and updated the computerized patient record.  Review and summarization of old records includes:    Past Medical History:   Diagnosis Date   • Anxiety    • Cardiac disease    • Depression    • Diabetes (CMS/HCC)    • Osteoporosis    • Stroke (CMS/HCC)    • Thyroid activity decreased f        Past Surgical History:   Procedure Laterality Date   • DEEP BRAIN STIMULATOR PLACEMENT     • GALLBLADDER SURGERY     • TONSILLECTOMY          Social History     Occupational History   • Not on file   Tobacco Use   • Smoking status: Not on file   Substance and Sexual Activity   • Alcohol use: Not on file   • Drug use: Not on file   • Sexual activity: Not on file    Social History     Social History Narrative   • Not on file        Family History   Problem Relation Age of Onset   • Heart disease Mother    •  Patient is a 57y old  Female who presents with a chief complaint of weakness of right upper and le (17 Jul 2018 17:42)      INTERVAL HPI/OVERNIGHT EVENTS: seen and examined at bedside. Still has the weakness and eye deviation.      MEDICATIONS  (STANDING):  aspirin  chewable 81 milliGRAM(s) Oral daily  atorvastatin 80 milliGRAM(s) Oral at bedtime  dextrose 5% + sodium chloride 0.9%. 1000 milliLiter(s) (70 mL/Hr) IV Continuous <Continuous>  enoxaparin Injectable 40 milliGRAM(s) SubCutaneous daily  pantoprazole  Injectable 40 milliGRAM(s) IV Push daily    MEDICATIONS  (PRN):  diphenhydrAMINE   Capsule 25 milliGRAM(s) Oral every 4 hours PRN Rash and/or Itching      Allergies    penicillin (Unknown)    Intolerances        REVIEW OF SYSTEMS:  CONSTITUTIONAL: No fever, weight loss, or fatigue  RESPIRATORY: No cough, wheezing, chills or hemoptysis; No shortness of breath  CARDIOVASCULAR: No chest pain, palpitations, dizziness, or leg swelling  GASTROINTESTINAL: No abdominal or epigastric pain. No nausea, vomiting, or hematemesis; No diarrhea or constipation. No melena or hematochezia.  NEUROLOGICAL: No headaches, memory loss, loss of strength, numbness, or tremors  MUSCULOSKELETAL: No joint pain or swelling; No muscle, back, or extremity pain      Vital Signs Last 24 Hrs  T(C): 36.8 (20 Jul 2018 11:18), Max: 37.1 (19 Jul 2018 15:59)  T(F): 98.2 (20 Jul 2018 11:18), Max: 98.8 (19 Jul 2018 15:59)  HR: 71 (20 Jul 2018 11:18) (70 - 91)  BP: 99/57 (20 Jul 2018 11:18) (93/49 - 105/52)  BP(mean): --  RR: 18 (20 Jul 2018 11:18) (18 - 18)  SpO2: 96% (20 Jul 2018 11:18) (94% - 97%)    PHYSICAL EXAM:  GENERAL: NAD, well-groomed, well-developed  HEAD:  Atraumatic, Normocephalic, facial droop   EYES: left eye deviated, conjunctiva and sclera clear  NECK: Supple, No JVD, Normal thyroid  NERVOUS SYSTEM:  Alert & Oriented X3, No gross focal deficits  CHEST/LUNG: Clear to percussion bilaterally; No rales, rhonchi, wheezing, or rubs  HEART: Regular rate and rhythm; No murmurs, rubs, or gallops  ABDOMEN: Soft, Nontender, Nondistended; Bowel sounds present  EXTREMITIES:  No clubbing, cyanosis, or edema  SKIN: No rashes or lesions    LABS:                        12.2   10.6  )-----------( 190      ( 20 Jul 2018 06:31 )             37.0     07-20    142  |  110<H>  |  9   ----------------------------<  102<H>  4.0   |  26  |  0.86    Ca    8.3<L>      20 Jul 2018 06:31  Phos  2.3     07-20  Mg     2.3     07-20    TPro  5.8<L>  /  Alb  2.6<L>  /  TBili  1.6<H>  /  DBili  x   /  AST  23  /  ALT  26  /  AlkPhos  86  07-20        CAPILLARY BLOOD GLUCOSE          RADIOLOGY & ADDITIONAL TESTS:    Imaging Personally Reviewed:  [x ] YES  [ ] NO    Consultant(s) Notes Reviewed:  [x ] YES  [ ] NO    Care Discussed with Consultants/Other Providers [x ] YES  [ ] NO    Plan of Care discussed with Housestaff [x ]YES [ ] NO "Hypertension Mother    • Heart disease Father    • Hypertension Father        ROS: 14 point review of systems was performed and all other systems were reviewed and are negative except for documented findings in HPI and today's encounter.     Allergies:   Allergies   Allergen Reactions   • Codeine Nausea And Vomiting   • Cephalexin Rash     Constitutional:  Denies fever, shaking or chills   Eyes:  Denies change in visual acuity   HENT:  Denies nasal congestion or sore throat   Respiratory:  Denies cough or shortness of breath   Cardiovascular:  Denies chest pain or severe LE edema   GI:  Denies abdominal pain, nausea, vomiting, bloody stools or diarrhea   Musculoskeletal:  Numbness, tingling, pain, or loss of motor function only as noted above in history of present illness.  : Denies painful urination or hematuria  Integument:  Denies rash, lesion or ulceration   Neurologic:  Denies headache or focal weakness  Endocrine:  Denies lymphadenopathy  Psych:  Denies confusion or change in mental status   Hem:  Denies active bleeding    OBJECTIVE:  Physical Exam: 67 y.o. female  Wt Readings from Last 3 Encounters:   02/21/20 68.5 kg (151 lb)     Ht Readings from Last 1 Encounters:   02/21/20 174 cm (68.5\")     Body mass index is 22.63 kg/m².  Vitals:    02/21/20 0801   Temp: 97.5 °F (36.4 °C)     Vital signs reviewed.     General Appearance:    Alert, cooperative, in no acute distress                  Eyes: conjunctiva clear  ENT: external ears and nose atraumatic  CV: no peripheral edema  Resp: normal respiratory effort  Skin: no rashes or wounds; normal turgor  Psych: mood and affect appropriate  Lymph: no nodes appreciated  Neuro: gross sensation intact  Vascular:  Palpable peripheral pulse in noted extremity  Musculoskeletal Extremities: med and lat joint line tenderness, swelling and synovitis, small effusion, good rom    Radiology:   Ap lat 40 pa left today without comparison show mod oa    Assessment:     ICD-10-CM " ICD-9-CM   1. Chronic pain of left knee M25.562 719.46    G89.29 338.29   2. Arthritis of left knee M17.12 716.96        Large Joint Arthrocentesis: L knee  Date/Time: 2/21/2020 8:30 AM  Consent given by: patient  Site marked: site marked  Timeout: Immediately prior to procedure a time out was called to verify the correct patient, procedure, equipment, support staff and site/side marked as required   Supporting Documentation  Indications: pain and joint swelling   Procedure Details  Location: knee - L knee  Preparation: Patient was prepped and draped in the usual sterile fashion  Needle gauge: 21.  Approach: anterolateral  Medications administered: 80 mg methylPREDNISolone acetate 80 MG/ML; 4 mL lidocaine (cardiac)  Patient tolerance: patient tolerated the procedure well with no immediate complications             Plan: Biomechanics of pertinent body area discussed.  Risks, benefits, alternatives, comparisons, and complications of accepted medicines, injections, recommendations, surgical procedures, and therapies explained and education provided in laymen's terms. Natural history and expected course of this patient's diagnosis discussed along with evaluation of therapies. Questions answered. When appropriate I also discussed proper use of cane, walker, trekking poles.   RICE: Rest, ice, compression, and elevation therapy, Cryotherapy/brachy therapy, and or OTC linaments as indicated with instructions.   Cortisone Injection. See procedure note.pt if not better in a few weeks      2/21/2020    Much of this encounter note is an electronic transcription/translation of spoken language to printed text. The electronic translation of spoken language may permit erroneous, or at times, nonsensical words or phrases to be inadvertently transcribed; Although I have reviewed the note for such errors, some may still exist

## 2021-06-28 NOTE — DISCHARGE NOTE ADULT - PLAN OF CARE
INITIAL ANTICOAGULATION VISIT    Reason for visit:  -Maria Esther is a 75 year old female here for her follow up visit to check INR and adjust warfarin dosing.     Referring Provider: Melchor Zelaya  Referral Expiration: 06/23/2022    INR result: 2.7, slightly below the desired therapeutic range of 3.0-4.0  Indication for therapy: ELLIE Clot and Atrial Fibrillation (CHADs-vasc = 5)  Referral Expiration date: 06/23/2022    Assessment:  The following was discussed/reviewed with the patient.  Compliance with warfarin: Pt able to recall warfarin dosing regimen without assistance. Denies any missed/extra doses.  Medications: She continues to take APAP occasionally for pain, but is aware not to exceed 2g/day.  Supplements: Not currently taking cranberry supplement  Dietary habits: Pt continuing to drink 1 Ensure/day in efforts to gain weight. She plans to have tuna salad w/ don once/week. Otherwise, she does not plan to eat other vitamin k foods.  Smoking (cigarettes or THC): does not smoke   Alcohol: does not drink alcohol; on very rare occassions will have 1 glass of wine  Upcoming Procedures or Interruptions in Therapy: N/A  Recent injuries or falls: After leaving clinic last Wed, pt states her shoe caught uneven ground and she hit her chin on the ground. Her jaw remains bruised and tender, but states that the swelling is significantly improved. She denies hitting her head. Pt denies any dizziness, confusion, changes in vision, or headaches. Pt reluctant to go to the ER for evaluation despite informing pt that it is still possible to have internal bleeding. Strongly urged her to go to the ER in the future should she hit her face/head again or develops any sx listed above. Of note, pt denies any other recent falls and is aware to inform clinic should she fall again in the future.  Signs/symptoms of thrombosis: Denies. Pt was diagnosed w/ ELLIE thrombus yesterday but is asymptomatic.  Adverse events of warfarin: Denies all signs  of bleeding. She does have bruising on her arm s/p procedure last week and on her jaw from her fall last week (see above).     Plan:  -INR is slightly below therapeutic range of 3.0-4.0 today. INR is 2.7 today, s/p 6 doses of warfarin 5 mg. This was a quick rise to INR 2.7 and since pt has even higher goal, INR will likely continue to rise into therapeutic range.  -Plan to have the patient continue warfarin 5mg daily; 35 mg/week.  -Pt is going out of town from 7/2-7/7. Since INR is not therapeutic yet, hesitant to wait until 7/9 for next apt (already scheduled in person apt for that day).   -Patient to return to ACL lab in 4 days (07/01/2021).  She is aware that clinic will call her either Thursday afternoon or Friday morning to discuss result telephonically.       Management You presented to the ED with symptoms of slurred speech, right upper and lower extremity weakness and facial droop. CT head was indicative of an acute stroke. MRI head showed acute stroke with multiple chronic infarcts noted. You are currently being treated with aspirin and atorvastatin.

## 2022-03-17 NOTE — DISCHARGE NOTE ADULT - VISION (WITH CORRECTIVE LENSES IF THE PATIENT USUALLY WEARS THEM):
Partially impaired: cannot see medication labels or newsprint, but can see obstacles in path, and the surrounding layout; can count fingers at arm's length Methotrexate Counseling:  Patient counseled regarding adverse effects of methotrexate including but not limited to nausea, vomiting, abnormalities in liver function tests. Patients may develop mouth sores, rash, diarrhea, and abnormalities in blood counts. The patient understands that monitoring is required including LFT's and blood counts.  There is a rare possibility of scarring of the liver and lung problems that can occur when taking methotrexate. Persistent nausea, loss of appetite, pale stools, dark urine, cough, and shortness of breath should be reported immediately. Patient advised to discontinue methotrexate treatment at least three months before attempting to become pregnant.  I discussed the need for folate supplements while taking methotrexate.  These supplements can decrease side effects during methotrexate treatment. The patient verbalized understanding of the proper use and possible adverse effects of methotrexate.  All of the patient's questions and concerns were addressed.

## 2022-10-03 NOTE — H&P ADULT - PSH
10/03/22 0325   NIV Type   $NIV $Daily Charge   NIV Started/Stopped On   Equipment Type v60   Mode Bilevel   Mask Type Full face mask   Mask Size Large   Settings/Measurements   IPAP 18 cmH20   CPAP/EPAP 6 cmH2O   Vt (Measured) 582 mL   Rate Ordered 14   Resp 19   FiO2  30 %   Mask Leak (lpm) 30 lpm   Comfort Level Good   Using Accessory Muscles No   Alarm Settings   Alarms On Y   Low Pressure (cmH2O) 6 cmH2O   High Pressure (cmH2O) 30 cmH2O No significant past surgical history

## 2022-12-23 NOTE — DISCHARGE NOTE ADULT - NS AS DC INITIAL [facility]  KNWN
[Very Good] : ~his/her~  mood as very good [Former] : Former [Yes] : Yes [Monthly or less (1 pt)] : Monthly or less (1 point) [1 or 2 (0 pts)] : 1 or 2 (0 points) [Never (0 pts)] : Never (0 points) [No] : In the past 12 months have you used drugs other than those required for medical reasons? No [No falls in past year] : Patient reported no falls in the past year [0] : 2) Feeling down, depressed, or hopeless: Not at all (0) [PHQ-2 Negative - No further assessment needed] : PHQ-2 Negative - No further assessment needed [Patient reported colonoscopy was normal] : Patient reported colonoscopy was normal [Alone] : lives alone [With Family] : lives with family [Single] : single [Fully functional (bathing, dressing, toileting, transferring, walking, feeding)] : Fully functional (bathing, dressing, toileting, transferring, walking, feeding) [Fully functional (using the telephone, shopping, preparing meals, housekeeping, doing laundry, using] : Fully functional and needs no help or supervision to perform IADLs (using the telephone, shopping, preparing meals, housekeeping, doing laundry, using transportation, managing medications and managing finances) [de-identified] : cigar [de-identified] : regualr golf and activity  [de-identified] : monitoring diet  [[id number]] : 0 [Reports changes in hearing] : Reports no changes in hearing [ColonoscopyDate] : 11/2022 [FreeTextEntry2] :   Assessed

## 2022-12-26 NOTE — PHYSICAL THERAPY INITIAL EVALUATION ADULT - DISCHARGE DISPOSITION, PT EVAL
Yes rehabilitation facility Acute Rehab. Pt. is highly motivated to return to baseline function of independence. Pt. can tolerate and would benefit greatly from 3hr multidisciplinary rehab services per day.

## 2023-01-01 NOTE — DISCHARGE NOTE ADULT - NS AS DC FOLLOWUP STROKE INST
Plan: Discussed using 50/50 of Aquaphor and Cerave Detail Level: Zone Continue Regimen: triamcinolone acetonide 0.1 % topical cream \\nQuantity: 80.0 g  Days Supply: 30\\nSig: Apply to dry itchy skin up to BID x2 weeks, prn for flare ups Render In Strict Bullet Format?: No Stroke (includes: TIA/SAH/ICH/Ischemic Stroke)

## 2023-06-07 NOTE — ED PROVIDER NOTE - NS ED ATTENDING STATEMENT MOD
IS THIS HELD APPT STILL AVAILABLE? [facility] CALLED REQUESTING PT BE SEEN BY DR Cordon 12  Attending Only

## 2025-02-10 NOTE — ED ADULT NURSE NOTE - GASTROINTESTINAL ASSESSMENT
Heart Failure Nutrition Therapy  This diet will help you feel better and support your heart by reducing symptoms of fluid retention, shortness of breath and swelling.   You should focus on:  Limiting sodium in your diet by reading labels and limiting foods high in sodium.  Limit your daily sodium intake to 2,000 mg per day.  Select foods with 140 mg of sodium or less per serving.  Foods with more than 300 mg of sodium per serving may not fit into a reduced-sodium meal plan.  Check serving sizes. If you eat more than 1 serving, you will get more sodium than the amount listed.   Limiting fluid in your diet.  Ask your doctor how much fluid you can have per day. In general, a fluid restriction of no more than 64 fluid ounces per day is recommended. This is equivalent to 8 cups of 8 ounces each or about 2 liters daily.   Remember foods that are liquid at room temperature such as popsicles, soup, ice cream and Jell-O are fluids.   Checking your weight to make sure you're not retaining too much fluid.  Weigh yourself every morning. If you gain 3 or more pounds in one day or 5 pounds within 1 week, call your doctor.  Foods to choose and avoid:  Avoid processed foods. Eat more fresh foods.  Fresh and frozen fruits and vegetables are good choices.  Choose fresh meats. Avoid processed meats such as stone, sausage and hot dogs.  Do not add salt to your food while cooking or at the table.  Try dry or fresh herbs, pepper, lemon juice, or a sodium-free seasoning blend such as Mrs. Dash to add flavor to food.   Do not use a salt substitute.  Use caution at restaurants  Restaurant foods are high in sodium. Ask for your food to be cooked without salt and request sauces and dressing to come “on the side.”      
WDL